# Patient Record
Sex: FEMALE | Race: WHITE | NOT HISPANIC OR LATINO | Employment: FULL TIME | URBAN - NONMETROPOLITAN AREA
[De-identification: names, ages, dates, MRNs, and addresses within clinical notes are randomized per-mention and may not be internally consistent; named-entity substitution may affect disease eponyms.]

---

## 2018-04-27 ENCOUNTER — OFFICE VISIT CONVERTED (OUTPATIENT)
Dept: FAMILY MEDICINE CLINIC | Age: 28
End: 2018-04-27
Attending: FAMILY MEDICINE

## 2018-07-03 ENCOUNTER — OFFICE VISIT CONVERTED (OUTPATIENT)
Dept: FAMILY MEDICINE CLINIC | Age: 28
End: 2018-07-03
Attending: NURSE PRACTITIONER

## 2018-08-29 ENCOUNTER — OFFICE VISIT CONVERTED (OUTPATIENT)
Dept: ORTHOPEDIC SURGERY | Facility: CLINIC | Age: 28
End: 2018-08-29
Attending: ORTHOPAEDIC SURGERY

## 2018-12-19 ENCOUNTER — OFFICE VISIT CONVERTED (OUTPATIENT)
Dept: FAMILY MEDICINE CLINIC | Age: 28
End: 2018-12-19
Attending: FAMILY MEDICINE

## 2019-03-07 ENCOUNTER — OFFICE VISIT CONVERTED (OUTPATIENT)
Dept: FAMILY MEDICINE CLINIC | Age: 29
End: 2019-03-07
Attending: FAMILY MEDICINE

## 2020-01-03 ENCOUNTER — OFFICE VISIT CONVERTED (OUTPATIENT)
Dept: FAMILY MEDICINE CLINIC | Age: 30
End: 2020-01-03
Attending: FAMILY MEDICINE

## 2020-01-03 ENCOUNTER — HOSPITAL ENCOUNTER (OUTPATIENT)
Dept: OTHER | Facility: HOSPITAL | Age: 30
Discharge: HOME | End: 2020-01-03
Attending: FAMILY MEDICINE

## 2020-01-14 ENCOUNTER — HOSPITAL ENCOUNTER (OUTPATIENT)
Dept: PHYSICAL THERAPY | Facility: CLINIC | Age: 30
Setting detail: RECURRING SERIES
Discharge: HOME | End: 2020-02-27
Attending: FAMILY MEDICINE

## 2020-01-21 ENCOUNTER — HOSPITAL ENCOUNTER (OUTPATIENT)
Dept: OTHER | Facility: HOSPITAL | Age: 30
Discharge: HOME | End: 2020-01-21
Attending: FAMILY MEDICINE

## 2020-01-28 ENCOUNTER — OFFICE VISIT CONVERTED (OUTPATIENT)
Dept: NEUROSURGERY | Facility: CLINIC | Age: 30
End: 2020-01-28
Attending: PHYSICIAN ASSISTANT

## 2020-02-05 ENCOUNTER — HOSPITAL ENCOUNTER (OUTPATIENT)
Dept: PERIOP | Facility: HOSPITAL | Age: 30
Setting detail: HOSPITAL OUTPATIENT SURGERY
Discharge: HOME | End: 2020-02-05
Attending: NEUROLOGICAL SURGERY

## 2020-02-27 ENCOUNTER — CONVERSION ENCOUNTER (OUTPATIENT)
Dept: NEUROLOGY | Facility: CLINIC | Age: 30
End: 2020-02-27

## 2020-02-27 ENCOUNTER — OFFICE VISIT CONVERTED (OUTPATIENT)
Dept: NEUROSURGERY | Facility: CLINIC | Age: 30
End: 2020-02-27
Attending: PHYSICIAN ASSISTANT

## 2020-03-30 ENCOUNTER — HOSPITAL ENCOUNTER (OUTPATIENT)
Dept: URGENT CARE | Facility: CLINIC | Age: 30
Discharge: HOME | End: 2020-03-30
Attending: FAMILY MEDICINE

## 2020-04-01 LAB — BACTERIA SPEC AEROBE CULT: NORMAL

## 2020-06-25 ENCOUNTER — OFFICE VISIT CONVERTED (OUTPATIENT)
Dept: NEUROSURGERY | Facility: CLINIC | Age: 30
End: 2020-06-25
Attending: PHYSICIAN ASSISTANT

## 2020-07-18 ENCOUNTER — HOSPITAL ENCOUNTER (OUTPATIENT)
Dept: MRI IMAGING | Facility: HOSPITAL | Age: 30
Discharge: HOME | End: 2020-07-18
Attending: PHYSICIAN ASSISTANT

## 2020-07-21 ENCOUNTER — OFFICE VISIT CONVERTED (OUTPATIENT)
Dept: NEUROSURGERY | Facility: CLINIC | Age: 30
End: 2020-07-21
Attending: PHYSICIAN ASSISTANT

## 2020-08-13 ENCOUNTER — HOSPITAL ENCOUNTER (OUTPATIENT)
Dept: GENERAL RADIOLOGY | Facility: HOSPITAL | Age: 30
Discharge: HOME | End: 2020-08-13
Attending: FAMILY MEDICINE

## 2020-08-13 ENCOUNTER — OFFICE VISIT CONVERTED (OUTPATIENT)
Dept: FAMILY MEDICINE CLINIC | Facility: CLINIC | Age: 30
End: 2020-08-13
Attending: FAMILY MEDICINE

## 2020-08-13 LAB
25(OH)D3 SERPL-MCNC: 32.8 NG/ML (ref 30–100)
ALBUMIN SERPL-MCNC: 4.2 G/DL (ref 3.5–5)
ALBUMIN/GLOB SERPL: 1.3 {RATIO} (ref 1.4–2.6)
ALP SERPL-CCNC: 59 U/L (ref 42–98)
ALT SERPL-CCNC: 67 U/L (ref 10–40)
ANION GAP SERPL CALC-SCNC: 21 MMOL/L (ref 8–19)
AST SERPL-CCNC: 39 U/L (ref 15–50)
BASOPHILS # BLD AUTO: 0.04 10*3/UL (ref 0–0.2)
BASOPHILS NFR BLD AUTO: 0.5 % (ref 0–3)
BILIRUB SERPL-MCNC: 0.19 MG/DL (ref 0.2–1.3)
BUN SERPL-MCNC: 10 MG/DL (ref 5–25)
BUN/CREAT SERPL: 10 {RATIO} (ref 6–20)
CALCIUM SERPL-MCNC: 10.1 MG/DL (ref 8.7–10.4)
CHLORIDE SERPL-SCNC: 106 MMOL/L (ref 99–111)
CONV ABS IMM GRAN: 0.04 10*3/UL (ref 0–0.2)
CONV CO2: 18 MMOL/L (ref 22–32)
CONV IMMATURE GRAN: 0.5 % (ref 0–1.8)
CONV TOTAL PROTEIN: 7.4 G/DL (ref 6.3–8.2)
CREAT UR-MCNC: 0.97 MG/DL (ref 0.5–0.9)
DEPRECATED RDW RBC AUTO: 45.6 FL (ref 36.4–46.3)
EOSINOPHIL # BLD AUTO: 0.18 10*3/UL (ref 0–0.7)
EOSINOPHIL # BLD AUTO: 2.2 % (ref 0–7)
ERYTHROCYTE [DISTWIDTH] IN BLOOD BY AUTOMATED COUNT: 12.8 % (ref 11.7–14.4)
GFR SERPLBLD BASED ON 1.73 SQ M-ARVRAT: >60 ML/MIN/{1.73_M2}
GLOBULIN UR ELPH-MCNC: 3.2 G/DL (ref 2–3.5)
GLUCOSE SERPL-MCNC: 90 MG/DL (ref 65–99)
HCT VFR BLD AUTO: 44.1 % (ref 37–47)
HGB BLD-MCNC: 14.4 G/DL (ref 12–16)
IRON SATN MFR SERPL: 19 % (ref 20–55)
IRON SERPL-MCNC: 70 UG/DL (ref 60–170)
LYMPHOCYTES # BLD AUTO: 2.48 10*3/UL (ref 1–5)
LYMPHOCYTES NFR BLD AUTO: 30.3 % (ref 20–45)
MCH RBC QN AUTO: 31.9 PG (ref 27–31)
MCHC RBC AUTO-ENTMCNC: 32.7 G/DL (ref 33–37)
MCV RBC AUTO: 97.8 FL (ref 81–99)
MONOCYTES # BLD AUTO: 0.72 10*3/UL (ref 0.2–1.2)
MONOCYTES NFR BLD AUTO: 8.8 % (ref 3–10)
NEUTROPHILS # BLD AUTO: 4.73 10*3/UL (ref 2–8)
NEUTROPHILS NFR BLD AUTO: 57.7 % (ref 30–85)
NRBC CBCN: 0 % (ref 0–0.7)
OSMOLALITY SERPL CALC.SUM OF ELEC: 289 MOSM/KG (ref 273–304)
PLATELET # BLD AUTO: 254 10*3/UL (ref 130–400)
PMV BLD AUTO: 10.8 FL (ref 9.4–12.3)
POTASSIUM SERPL-SCNC: 4.8 MMOL/L (ref 3.5–5.3)
RBC # BLD AUTO: 4.51 10*6/UL (ref 4.2–5.4)
SODIUM SERPL-SCNC: 140 MMOL/L (ref 135–147)
T4 FREE SERPL-MCNC: 1 NG/DL (ref 0.9–1.8)
TIBC SERPL-MCNC: 372 UG/DL (ref 245–450)
TRANSFERRIN SERPL-MCNC: 260 MG/DL (ref 250–380)
TSH SERPL-ACNC: 2.03 M[IU]/L (ref 0.27–4.2)
WBC # BLD AUTO: 8.19 10*3/UL (ref 4.8–10.8)

## 2020-08-19 ENCOUNTER — TELEPHONE CONVERTED (OUTPATIENT)
Dept: FAMILY MEDICINE CLINIC | Facility: CLINIC | Age: 30
End: 2020-08-19
Attending: FAMILY MEDICINE

## 2020-09-10 ENCOUNTER — HOSPITAL ENCOUNTER (OUTPATIENT)
Dept: GENERAL RADIOLOGY | Facility: HOSPITAL | Age: 30
Discharge: HOME | End: 2020-09-10
Attending: FAMILY MEDICINE

## 2020-09-10 ENCOUNTER — OFFICE VISIT CONVERTED (OUTPATIENT)
Dept: FAMILY MEDICINE CLINIC | Facility: CLINIC | Age: 30
End: 2020-09-10
Attending: FAMILY MEDICINE

## 2020-09-10 ENCOUNTER — CONVERSION ENCOUNTER (OUTPATIENT)
Dept: FAMILY MEDICINE CLINIC | Facility: CLINIC | Age: 30
End: 2020-09-10

## 2020-10-05 ENCOUNTER — OFFICE VISIT CONVERTED (OUTPATIENT)
Dept: PODIATRY | Facility: CLINIC | Age: 30
End: 2020-10-05
Attending: PODIATRIST

## 2020-10-26 ENCOUNTER — TELEPHONE CONVERTED (OUTPATIENT)
Dept: FAMILY MEDICINE CLINIC | Facility: CLINIC | Age: 30
End: 2020-10-26
Attending: FAMILY MEDICINE

## 2020-12-03 ENCOUNTER — HOSPITAL ENCOUNTER (OUTPATIENT)
Dept: PREADMISSION TESTING | Facility: HOSPITAL | Age: 30
Discharge: HOME | End: 2020-12-03
Attending: OBSTETRICS & GYNECOLOGY

## 2020-12-07 LAB — SARS-COV-2 RNA SPEC QL NAA+PROBE: NOT DETECTED

## 2020-12-08 ENCOUNTER — HOSPITAL ENCOUNTER (OUTPATIENT)
Dept: PERIOP | Facility: HOSPITAL | Age: 30
Setting detail: HOSPITAL OUTPATIENT SURGERY
Discharge: HOME | End: 2020-12-09
Attending: OBSTETRICS & GYNECOLOGY

## 2020-12-08 LAB
ABO GROUP BLD: NORMAL
ANION GAP SERPL CALC-SCNC: 11 MMOL/L (ref 8–19)
BASOPHILS # BLD AUTO: 0.04 10*3/UL (ref 0–0.2)
BASOPHILS NFR BLD AUTO: 0.6 % (ref 0–3)
BLD GP AB SCN SERPL QL: NORMAL
BUN SERPL-MCNC: 10 MG/DL (ref 5–25)
BUN/CREAT SERPL: 10 {RATIO} (ref 6–20)
CALCIUM SERPL-MCNC: 8.8 MG/DL (ref 8.7–10.4)
CHLORIDE SERPL-SCNC: 107 MMOL/L (ref 99–111)
CONV ABD CONTROL: NORMAL
CONV ABS IMM GRAN: 0.03 10*3/UL (ref 0–0.2)
CONV CO2: 23 MMOL/L (ref 22–32)
CONV IMMATURE GRAN: 0.4 % (ref 0–1.8)
CREAT UR-MCNC: 0.97 MG/DL (ref 0.5–0.9)
DEPRECATED RDW RBC AUTO: 45.5 FL (ref 36.4–46.3)
EOSINOPHIL # BLD AUTO: 0.18 10*3/UL (ref 0–0.7)
EOSINOPHIL # BLD AUTO: 2.5 % (ref 0–7)
ERYTHROCYTE [DISTWIDTH] IN BLOOD BY AUTOMATED COUNT: 12.8 % (ref 11.7–14.4)
GFR SERPLBLD BASED ON 1.73 SQ M-ARVRAT: >60 ML/MIN/{1.73_M2}
GLUCOSE SERPL-MCNC: 99 MG/DL (ref 65–99)
HCT VFR BLD AUTO: 41.6 % (ref 37–47)
HCT VFR BLD AUTO: 42.6 % (ref 37–47)
HGB BLD-MCNC: 13.6 G/DL (ref 12–16)
HGB BLD-MCNC: 14.3 G/DL (ref 12–16)
LYMPHOCYTES # BLD AUTO: 2.61 10*3/UL (ref 1–5)
LYMPHOCYTES NFR BLD AUTO: 36.8 % (ref 20–45)
MCH RBC QN AUTO: 31.3 PG (ref 27–31)
MCHC RBC AUTO-ENTMCNC: 32.7 G/DL (ref 33–37)
MCV RBC AUTO: 95.6 FL (ref 81–99)
MONOCYTES # BLD AUTO: 0.68 10*3/UL (ref 0.2–1.2)
MONOCYTES NFR BLD AUTO: 9.6 % (ref 3–10)
NEUTROPHILS # BLD AUTO: 3.56 10*3/UL (ref 2–8)
NEUTROPHILS NFR BLD AUTO: 50.1 % (ref 30–85)
NRBC CBCN: 0 % (ref 0–0.7)
OSMOLALITY SERPL CALC.SUM OF ELEC: 281 MOSM/KG (ref 273–304)
PLATELET # BLD AUTO: 228 10*3/UL (ref 130–400)
PMV BLD AUTO: 10.1 FL (ref 9.4–12.3)
POTASSIUM SERPL-SCNC: 4.6 MMOL/L (ref 3.5–5.3)
RBC # BLD AUTO: 4.35 10*6/UL (ref 4.2–5.4)
RH BLD: NORMAL
SODIUM SERPL-SCNC: 136 MMOL/L (ref 135–147)
WBC # BLD AUTO: 7.1 10*3/UL (ref 4.8–10.8)

## 2020-12-09 LAB
ANION GAP SERPL CALC-SCNC: 13 MMOL/L (ref 8–19)
BASOPHILS # BLD AUTO: 0.04 10*3/UL (ref 0–0.2)
BASOPHILS NFR BLD AUTO: 0.2 % (ref 0–3)
BUN SERPL-MCNC: 9 MG/DL (ref 5–25)
BUN/CREAT SERPL: 10 {RATIO} (ref 6–20)
CALCIUM SERPL-MCNC: 9.1 MG/DL (ref 8.7–10.4)
CHLORIDE SERPL-SCNC: 104 MMOL/L (ref 99–111)
CONV ABS IMM GRAN: 0.11 10*3/UL (ref 0–0.2)
CONV CO2: 20 MMOL/L (ref 22–32)
CONV IMMATURE GRAN: 0.6 % (ref 0–1.8)
CREAT UR-MCNC: 0.87 MG/DL (ref 0.5–0.9)
DEPRECATED RDW RBC AUTO: 43.1 FL (ref 36.4–46.3)
EOSINOPHIL # BLD AUTO: 0.01 10*3/UL (ref 0–0.7)
EOSINOPHIL # BLD AUTO: 0.1 % (ref 0–7)
ERYTHROCYTE [DISTWIDTH] IN BLOOD BY AUTOMATED COUNT: 12.6 % (ref 11.7–14.4)
GFR SERPLBLD BASED ON 1.73 SQ M-ARVRAT: >60 ML/MIN/{1.73_M2}
GLUCOSE SERPL-MCNC: 125 MG/DL (ref 65–99)
HCT VFR BLD AUTO: 38.6 % (ref 37–47)
HGB BLD-MCNC: 13.1 G/DL (ref 12–16)
LYMPHOCYTES # BLD AUTO: 2.59 10*3/UL (ref 1–5)
LYMPHOCYTES NFR BLD AUTO: 14.3 % (ref 20–45)
MCH RBC QN AUTO: 31.4 PG (ref 27–31)
MCHC RBC AUTO-ENTMCNC: 33.9 G/DL (ref 33–37)
MCV RBC AUTO: 92.6 FL (ref 81–99)
MONOCYTES # BLD AUTO: 1.1 10*3/UL (ref 0.2–1.2)
MONOCYTES NFR BLD AUTO: 6.1 % (ref 3–10)
NEUTROPHILS # BLD AUTO: 14.26 10*3/UL (ref 2–8)
NEUTROPHILS NFR BLD AUTO: 78.7 % (ref 30–85)
NRBC CBCN: 0 % (ref 0–0.7)
OSMOLALITY SERPL CALC.SUM OF ELEC: 274 MOSM/KG (ref 273–304)
PLATELET # BLD AUTO: 309 10*3/UL (ref 130–400)
PMV BLD AUTO: 10.3 FL (ref 9.4–12.3)
POTASSIUM SERPL-SCNC: 5.1 MMOL/L (ref 3.5–5.3)
RBC # BLD AUTO: 4.17 10*6/UL (ref 4.2–5.4)
SODIUM SERPL-SCNC: 132 MMOL/L (ref 135–147)
WBC # BLD AUTO: 18.11 10*3/UL (ref 4.8–10.8)

## 2020-12-16 ENCOUNTER — CONVERSION ENCOUNTER (OUTPATIENT)
Dept: FAMILY MEDICINE CLINIC | Facility: CLINIC | Age: 30
End: 2020-12-16

## 2020-12-16 ENCOUNTER — TELEPHONE CONVERTED (OUTPATIENT)
Dept: FAMILY MEDICINE CLINIC | Facility: CLINIC | Age: 30
End: 2020-12-16
Attending: FAMILY MEDICINE

## 2021-01-18 ENCOUNTER — OFFICE VISIT CONVERTED (OUTPATIENT)
Dept: FAMILY MEDICINE CLINIC | Facility: CLINIC | Age: 31
End: 2021-01-18
Attending: FAMILY MEDICINE

## 2021-02-12 ENCOUNTER — TELEPHONE CONVERTED (OUTPATIENT)
Dept: FAMILY MEDICINE CLINIC | Facility: CLINIC | Age: 31
End: 2021-02-12
Attending: FAMILY MEDICINE

## 2021-03-05 ENCOUNTER — TELEMEDICINE CONVERTED (OUTPATIENT)
Dept: FAMILY MEDICINE CLINIC | Facility: CLINIC | Age: 31
End: 2021-03-05
Attending: FAMILY MEDICINE

## 2021-03-25 ENCOUNTER — HOSPITAL ENCOUNTER (OUTPATIENT)
Dept: GENERAL RADIOLOGY | Facility: HOSPITAL | Age: 31
Discharge: HOME | End: 2021-03-25
Attending: FAMILY MEDICINE

## 2021-03-25 ENCOUNTER — OFFICE VISIT CONVERTED (OUTPATIENT)
Dept: FAMILY MEDICINE CLINIC | Facility: CLINIC | Age: 31
End: 2021-03-25
Attending: FAMILY MEDICINE

## 2021-03-25 LAB
ALBUMIN SERPL-MCNC: 4.2 G/DL (ref 3.5–5)
ALBUMIN/GLOB SERPL: 1.2 {RATIO} (ref 1.4–2.6)
ALP SERPL-CCNC: 91 U/L (ref 42–98)
ALT SERPL-CCNC: 72 U/L (ref 10–40)
ANION GAP SERPL CALC-SCNC: 18 MMOL/L (ref 8–19)
AST SERPL-CCNC: 39 U/L (ref 15–50)
BASOPHILS # BLD AUTO: 0.05 10*3/UL (ref 0–0.2)
BASOPHILS NFR BLD AUTO: 0.4 % (ref 0–3)
BILIRUB SERPL-MCNC: 0.18 MG/DL (ref 0.2–1.3)
BUN SERPL-MCNC: 12 MG/DL (ref 5–25)
BUN/CREAT SERPL: 12 {RATIO} (ref 6–20)
CALCIUM SERPL-MCNC: 9.2 MG/DL (ref 8.7–10.4)
CHLORIDE SERPL-SCNC: 101 MMOL/L (ref 99–111)
CHOLEST SERPL-MCNC: 239 MG/DL (ref 107–200)
CHOLEST/HDLC SERPL: 6.6 {RATIO} (ref 3–6)
CONV ABS IMM GRAN: 0.08 10*3/UL (ref 0–0.2)
CONV CO2: 25 MMOL/L (ref 22–32)
CONV IMMATURE GRAN: 0.7 % (ref 0–1.8)
CONV TOTAL PROTEIN: 7.8 G/DL (ref 6.3–8.2)
CREAT UR-MCNC: 1.01 MG/DL (ref 0.5–0.9)
DEPRECATED RDW RBC AUTO: 44.4 FL (ref 36.4–46.3)
EOSINOPHIL # BLD AUTO: 0.23 10*3/UL (ref 0–0.7)
EOSINOPHIL # BLD AUTO: 2 % (ref 0–7)
ERYTHROCYTE [DISTWIDTH] IN BLOOD BY AUTOMATED COUNT: 12.7 % (ref 11.7–14.4)
FERRITIN SERPL-MCNC: 107 NG/ML (ref 10–200)
FOLATE SERPL-MCNC: 15.5 NG/ML (ref 4.8–20)
FSH SERPL-ACNC: 25.7 M[IU]/ML
GFR SERPLBLD BASED ON 1.73 SQ M-ARVRAT: >60 ML/MIN/{1.73_M2}
GLOBULIN UR ELPH-MCNC: 3.6 G/DL (ref 2–3.5)
GLUCOSE SERPL-MCNC: 113 MG/DL (ref 65–99)
HCT VFR BLD AUTO: 44.8 % (ref 37–47)
HDLC SERPL-MCNC: 36 MG/DL (ref 40–60)
HGB BLD-MCNC: 14.7 G/DL (ref 12–16)
IRON SATN MFR SERPL: 16 % (ref 20–55)
IRON SERPL-MCNC: 68 UG/DL (ref 60–170)
LDLC SERPL CALC-MCNC: 134 MG/DL (ref 70–100)
LH SERPL-ACNC: 30.4 M[IU]/ML
LYMPHOCYTES # BLD AUTO: 3.45 10*3/UL (ref 1–5)
LYMPHOCYTES NFR BLD AUTO: 29.3 % (ref 20–45)
MCH RBC QN AUTO: 31.1 PG (ref 27–31)
MCHC RBC AUTO-ENTMCNC: 32.8 G/DL (ref 33–37)
MCV RBC AUTO: 94.9 FL (ref 81–99)
MONOCYTES # BLD AUTO: 0.94 10*3/UL (ref 0.2–1.2)
MONOCYTES NFR BLD AUTO: 8 % (ref 3–10)
NEUTROPHILS # BLD AUTO: 7.01 10*3/UL (ref 2–8)
NEUTROPHILS NFR BLD AUTO: 59.6 % (ref 30–85)
NRBC CBCN: 0 % (ref 0–0.7)
OSMOLALITY SERPL CALC.SUM OF ELEC: 289 MOSM/KG (ref 273–304)
PLATELET # BLD AUTO: 277 10*3/UL (ref 130–400)
PMV BLD AUTO: 11.4 FL (ref 9.4–12.3)
POTASSIUM SERPL-SCNC: 4.8 MMOL/L (ref 3.5–5.3)
RBC # BLD AUTO: 4.72 10*6/UL (ref 4.2–5.4)
SODIUM SERPL-SCNC: 139 MMOL/L (ref 135–147)
T4 FREE SERPL-MCNC: 1.2 NG/DL (ref 0.9–1.8)
TIBC SERPL-MCNC: 436 UG/DL (ref 245–450)
TRANSFERRIN SERPL-MCNC: 305 MG/DL (ref 250–380)
TRIGL SERPL-MCNC: 478 MG/DL (ref 40–150)
TSH SERPL-ACNC: 1.48 M[IU]/L (ref 0.27–4.2)
VIT B12 SERPL-MCNC: 418 PG/ML (ref 211–911)
WBC # BLD AUTO: 11.76 10*3/UL (ref 4.8–10.8)

## 2021-03-29 LAB
CONV ANTI MICROSOMAL AB: <9 IU/ML (ref 0–34)
THYROGLOBULIN ANTIBODY: <1 IU/ML (ref 0–0.9)

## 2021-04-08 ENCOUNTER — OFFICE VISIT CONVERTED (OUTPATIENT)
Dept: FAMILY MEDICINE CLINIC | Facility: CLINIC | Age: 31
End: 2021-04-08
Attending: FAMILY MEDICINE

## 2021-04-16 ENCOUNTER — HOSPITAL ENCOUNTER (OUTPATIENT)
Dept: GENERAL RADIOLOGY | Facility: HOSPITAL | Age: 31
Discharge: HOME | End: 2021-04-16
Attending: FAMILY MEDICINE

## 2021-04-16 LAB
EST. AVERAGE GLUCOSE BLD GHB EST-MCNC: 114 MG/DL
HBA1C MFR BLD: 5.6 % (ref 3.5–5.7)

## 2021-05-06 ENCOUNTER — HOSPITAL ENCOUNTER (OUTPATIENT)
Dept: DIABETES SERVICES | Facility: HOSPITAL | Age: 31
Discharge: HOME | End: 2021-05-06
Attending: NURSE PRACTITIONER

## 2021-05-06 ENCOUNTER — OFFICE VISIT CONVERTED (OUTPATIENT)
Dept: URGENT CARE | Facility: CLINIC | Age: 31
End: 2021-05-06
Attending: NURSE PRACTITIONER

## 2021-05-06 LAB — GLUCOSE BLD-MCNC: 97 MG/DL (ref 65–99)

## 2021-05-10 NOTE — H&P
History and Physical      Patient Name: Raven Eckert   Patient ID: [id number]   Sex: Female   YOB: 1990    Referring Provider: Srinivasa Clarke MD    Visit Date: August 13, 2020    Provider: Fransisca Carver DO   Location: [facility]   Location Address: [address] [place] [address]  [address]  [place], [place]  [id number]   Location Phone: [phone]          Chief Complaint  · Pt here today to establish care and would like to have a PT referral and GYN referral.      History Of Present Illness  Raven Eckert is a 29 year old /White female who presents for evaluation and treatment of:      She is here today to establish relations a new patient.  She has a past medical history significant for arthritis, diabetes, bipolar, hypoglycemia, muscle cramps and seasonal allergies.    She has past medical history significant for degenerative disc disease and granulation tissue on L5/S1 with a very small left L4/5 disc protrusion which is stable from prior MRI.    She had laminectomy in January. She has chronic low back pain. She is managed by Dr Ander Street of pain management. She is asking for physcial therapy.     She is complained today of menometrorrhagia.  She has been on metformin in the past that did help with her symptoms.  She is asking for gynecology referral.    She has been seeing [email or web address] in the past. She is on Latuda and it is doing great.    The patient is interested in losing weight.  She struggled with weight most of her life.  She denies any illicit drug use and has never been on weight loss medication.    She has no other complaints today denies chest pain, shortness of breath, weakness, numbness, nausea, vomiting, diarrhea dizziness or syncope.       Past Medical History  Disease Name Date Onset Notes   Anxiety --  --    Arthritis --  --    Bipolar mood disorder --  --    Broken Bones 2012 --    Cataract --  --    Degenerative disc disease, lumbar --  --     Depression --  --    Diabetes --  --    Foot pain --  --    Hypoglycemia --  --    Leg pain --  --    Limb Swelling --  --    Muscle cramps --  --    Psychiatric disorder --  --    Seasonal allergies --  --          Past Surgical History  Procedure Name Date Notes   Ablation --  --    Cesarian Section --  --    Foot surgery --  --    Metal implants --  --    Minimally invasive Discectomy 2-5-20 Left L5-S1         Medication List  Name Date Started Instructions   clonazepam 0.5 mg oral tablet  take 1 tablet (0.5 mg) by oral route 1 time per day   hydrocodone-acetaminophen 5-325 mg oral tablet 08/04/2020 take 1 tablet by oral route 3 times a day as needed PAIN   Latuda 20 mg oral tablet  take 1 tablet (20 mg) by oral route once daily         Allergy List  Allergen Name Date Reaction Notes   Lamictal --  --  --    SULFA (SULFONAMIDES) --  --  --          Family Medical History  Disease Name Relative/Age Notes   Diabetes, unspecified type Mother/   Mother   Arthrtis Father/  Mother/   --    Family history of certain chronic disabling diseases; arthritis Father/  Mother/   Mother; Father   Family history of Arthritis Father/  Mother/   Mother; Father   Family history of cancer Father/   Father   Family history of diabetes mellitus Mother/   Mother         Social History  Finding Status Start/Stop Quantity Notes   Alcohol Never --/-- --  --    lives with children --  --/-- --  --    Recreational Drug Use Never --/-- --  no   Tobacco Current every day --/-- 1 PPD current every day smoker, 1 packs per day, smoked 6-10 years  current every day smoker, 0.5 pack per day, smoked 6-10 years   Working --  --/-- --  --          Review of Systems  · Constitutional  o Denies  o : fatigue, night sweats  · HENT  o Denies  o : vertigo, recent head injury  · Cardiovascular  o Denies  o : chest pain, irregular heart beats  · Respiratory  o Denies  o : shortness of breath, productive cough  · Gastrointestinal  o Denies  o : nausea,  "vomiting  · Genitourinary  o Denies  o : dysuria, urinary retention  · Musculoskeletal  o Denies  o : joint swelling, limitation of motion  · Endocrine  o Denies  o : cold intolerance, heat intolerance      Vitals  Date Time BP Position Site L\R Cuff Size HR RR TEMP (F) WT  HT  BMI kg/m2 BSA m2 O2 Sat        08/13/2020 08:53 /58 Sitting    91 - R 16 97.5 339lbs 8oz 5'  6\" 54.8 2.68 100 %          Physical Examination  · Constitutional  o Appearance  o : morbidly obese, well developed, alert, no acute distress  · Head and Face  o Head  o :   § Inspection  § : atraumatic, normocephalic  · Eyes  o Eyes  o : extraocular movements intact, no scleral icterus, no conjunctival injection  · Ears, Nose, Mouth and Throat  o Ears  o :   § External Ears  § : normal  o Nose  o :   § Intranasal Exam  § : nares patent  o Oral Cavity  o :   § Oral Mucosa  § : moist mucous membranes  · Respiratory  o Respiratory Effort  o : breathing comfortably, symmetric chest rise  o Auscultation of Lungs  o : clear to asculatation bilaterally, no wheezes, rales, or rhonchii  · Cardiovascular  o Heart  o :   § Auscultation of Heart  § : regular rate and rhythm, no murmurs, rubs, or gallops  o Peripheral Vascular System  o :   § Extremities  § : no edema  · Skin and Subcutaneous Tissue  o General Inspection  o : no rashes present, no lesions present, no areas of discoloration, skin turgor normal  · Neurologic  o Mental Status Examination  o :   § Orientation  § : grossly oriented to person, place and time  o Cranial Nerves  o : crainial nerves 2-12 grossly intact  o Gait and Station  o :   § Gait Screening  § : normal gait  · Psychiatric  o General  o : normal mood and affect              Assessment  · Fatigue     780.79/R53.83  · Screening for depression     V79.0/Z13.89  · Adult BMI 50.0-59.9 kg/sq m     V85.43/Z68.43  · Menometrorrhagia     626.2/N92.1  · Low back pain     724.2/M54.5      Plan  · Orders  o CBC with Auto Diff [facility] (32616) " - 780.79/R53.83 - 08/13/2020  o CMP H (54068) - 780.79/R53.83 - 08/13/2020  o Iron panel (iron, TIBC, transferrin saturation) (62758, 91781, 20053) - 780.79/R53.83, 626.2/N92.1 - 08/13/2020  o Thyroid Profile (41292, 11935, THYII) - 780.79/R53.83 - 08/13/2020  o Vitamin D (25-Hydroxy) Level (99507) - 780.79/R53.83 - 08/13/2020  o ACO-39: Current medications updated and reviewed () - - 08/13/2020  o ACO-18: Negative screen for clinical depression using a standardized tool () - - 08/13/2020  o OB/GYN CONSULTATION (OBGYN) - 626.2/N92.1 - 08/13/2020  o PHYSICAL THERAPY CONSULTATION (PHYTH) - 724.2/M54.5 - 08/13/2020  · Medications  o Medications have been Reconciled  o Transition of Care or Provider Policy  · Instructions  o Depression Screen completed and scanned into the EMR under the designated folder within the patient's documents.  o Today's PHQ-9 result is _6__  o Patient was educated/instructed on their diagnosis, treatment and medications prior to discharge from the clinic today.  · Disposition  o Return Visit Request in/on 1 month +/- 2 days (86794).     1.  Low back pain: The patient was given referral today for physical therapy.  2.  Menometrorrhagia: The patient was given a referral today to gynecology.  3.  Morbid obesity: Diet, weight loss and exercise were highly encouraged today's visit.  Different weight loss medications were reviewed.  Should be brought back in a month and at that time we will re-broach the subject and manage according findings.  4.  Fatigue: CBC, CMP, iron panel as well as thyroid profile were ordered today and should be managed according to findings.  Follow-up in 1 month.             Electronically Signed by: Fransisca Carver DO -Author on August 13, 2020 02:03:21 PM

## 2021-05-10 NOTE — H&P
History and Physical      Patient Name: Raven Eckert   Patient ID: [id number]   Sex: Female   YOB: 1990    Primary Care Provider: Fransisca Carver DO   Referring Provider: Fransisca Carver DO    Visit Date: October 5, 2020    Provider: Otilio Rowland DPM   Location: [facility]   Location Address: [address] [place] [address]  [id number]   Location Phone: [phone]          Chief Complaint  · Left Foot Pain      History Of Present Illness  Raven Eckert is a 29 year old /White female who presents to the [facility] today new patient referred from Fransisca Carver DO.      New, Established, New Problem:  new  Location:  Left ankle  Duration:  2003 Left ankle fracture  Onset:  insidious  Nature:  sore, achy  Stable, worsening, improving:  worsening    Aggravating factors:   Patient relates pain is aggravated by shoe gear and ambulation.   Previous Treatment:  2003 ORIF Left ankle fracture after a fall by Dr. Torres ([place]), 2009 Hardware removal        New, Established, New Problem:  SECOND PROBLEM  Location:  LEFT 5TH TOE  Duration:  30 SEPTEMBER 2020  Onset:  TRAUMA  Nature:  SORE, SHARP  Stable, worsening, improving:  STABLE    Aggravating factors:     Previous Treatment:  X-RAY, CAM walker        Pt states he also sees Dr. Ander Street for low back issues and has had [facility].    Back surgery of L-4 & L-5 in January 2020 by Dr. Street.    Pt works as a correction officers.       Past Medical History  Anxiety; Arthritis; Bipolar mood disorder; Broken Bones; Cataract; Degenerative disc disease, lumbar; Depression; Diabetes; Foot pain; Heel pain; Hypoglycemia; Leg pain; Limb Swelling; Muscle cramps; Numbness in feet; Psychiatric disorder; Seasonal allergies         Past Surgical History  Ablation; Cesarian Section; Foot surgery; Metal implants; Minimally invasive Discectomy         Medication List  Latuda 40 mg  oral tablet; phentermine 30 mg oral capsule         Allergy List  Lamictal; SULFA (SULFONAMIDES)         Family Medical History  Diabetes, unspecified type; Arthrtis; Family history of certain chronic disabling diseases; arthritis; Family history of Arthritis; Family history of cancer; Family history of diabetes mellitus         Social History  Alcohol (Never); lives with children; Recreational Drug Use (Never); Tobacco (Current every day); Working         Review of Systems  · Constitutional  o Denies  o : fatigue, night sweats  · Eyes  o Denies  o : double vision, blurred vision  · HENT  o Denies  o : vertigo, recent head injury  · Cardiovascular  o Denies  o : chest pain, irregular heart beats  · Respiratory  o Denies  o : shortness of breath, productive cough  · Gastrointestinal  o Denies  o : nausea, vomiting  · Genitourinary  o Denies  o : dysuria, urinary retention  · Integument  o Denies  o : hair growth change, new skin lesions  · Neurologic  o Denies  o : altered mental status, seizures  · Musculoskeletal  o * See HPI  · Endocrine  o Denies  o : cold intolerance, heat intolerance  · Heme-Lymph  o Denies  o : petechiae, lymph node enlargement or tenderness  · Allergic-Immunologic  o Denies  o : frequent illnesses      Physical Examination  · Constitutional  o Appearance  o : overweight, well developed  · Cardiovascular  o Peripheral Vascular System  o :   § Pedal Pulses  § : pulses 2 + and symmetrical  § Extremities  § : no edema in lower extremities  · Musculoskeletal  o General  o :   § General Musculoskeletal  § : Lower extremity muscle and strength and range of motion is equal and symmetrical bilaterally. The knees are noted to be normal in alignment. Ankle alignment and range of motion is notmral and foot structure is normal. Subtalar, metatarsal and metatarsal-phalangeal range of motion is noted to be within normal limits. The digits of both feet are in normal alignment. The gait is normal. Left toe in  rectus position. Local ecchymosis. No signs of edema, erythema, lymphangitis, nor signs of infection.   · Skin and Subcutaneous Tissue  o General Inspection  o : Skin is noted to have normal texture and turgor, with no excrescences noted.   o Digits and Nails  o : The toenails are noted to be without disease.  · Neurologic  o Sensation  o : Epicritic sensations intact bilaterally.  · Left Ankle/Foot  o Inspection  o : Positive tenderness to palpation to the medial tubercle of the calcaneus. No signs of edema, erythema, lymphangitis, nor signs of infection.     Dr. Rowland reviewed radiographs and results from [facility] and discussed them with the patient.  These are significant for non, displaced, non-intra-articular fracture of the 5th proximal phalanx.  Previous ORIF in tibial.  There is degenerative joint disease of the ankle joint seen.      Pt is walking in a CAM walker on Left.           Assessment  · Foot pain, left     729.5/M79.672  · Plantar fascial fibromatosis of left foot     728.71/M72.2  · Toe fracture, left     826.0/S92.912A      Plan  · Medications  o naproxen 500 mg oral tablet   SIG: take 1 tablet by oral route 2 times a day as needed for 30 days   DISP: (60) Tablet with 1 refills  Prescribed on 10/05/2020     o Medrol (Rustam) 4 mg oral tablets,dose pack   SIG: take as directed for 6 days   DISP: (1) Package with 0 refills  Prescribed on 10/05/2020     o Medications have been Reconciled  o Transition of Care or Provider Policy  · Instructions  o Handouts provided regarding Planter Fascitis.  o Overview: This patient has a plantar fasciitis.  o Discuss Findings: I have discussed the findings of this evaluation with the patient. The discussion included a complete verbal explanation of any changes in the examination results, diagnosis, and the current treatment plan. A schedule for future care needs was explained. If any questions should arise after returning home, I have encouraged  the patient to feel free to contact Dr. Rowland. The patient states understanding and agreement with this plan.  o Monitor For Problems: Pt to monitor for problems and to contact Dr. Rowland for follow-up should such signs occur. Patient states understanding and agreement with this plan.  o Spenco: Recommend OTC Spenco Orthotics.  o Treatement Alternatives: Nonsurgical treatments almost always improve the pain. Treatment can last from several months to 2 years before symptoms get better. Most patients feel better in 9 months. Rarely Some people need surgery to relieve the pain.  o Conservative Treatment Recommendations: Anti-inflammatory medication to reduce pain and inflammation, Heel stretching exercises,   o Follow Up in 2 weeks.  o Patient may begin to weight bear as tolerated in supportive shoes. No impact activities for one month. After that time, the patient may increase activities as tolerated. Patient states understanding and agreement with this plan.  o Electronically Identified Patient Education Materials Provided Electronically  · Disposition  o Call or Return if symptoms worsen or persist.  · Referrals  o ID: [id number] Date: 09/16/2020 Type: Inbound  Specialty: Podiatry            Electronically Signed by: Otilio Rowland DPM -Author on October 12, 2020 09:32:38 AM

## 2021-05-11 ENCOUNTER — HOSPITAL ENCOUNTER (OUTPATIENT)
Dept: URGENT CARE | Facility: CLINIC | Age: 31
Discharge: HOME | End: 2021-05-11
Attending: PHYSICIAN ASSISTANT

## 2021-05-13 NOTE — PROGRESS NOTES
Progress Note      Patient Name: Raven Eckert   Patient ID: [id number]   Sex: Female   YOB: 1990    Primary Care Provider: Fransisca Carver DO   Referring Provider: Fransisca Carver DO    Visit Date: October 26, 2020    Provider: Fransisca Carver DO   Location: [facility]   Location Address: [address] [place] [address]  [id number]   Location Phone: [phone]          Chief Complaint  · Follow up for phentermine       History Of Present Illness  Raven Eckert is a 29 year old /White female who presents for evaluation and treatment of:   TELEHEALTH TELEPHONE VISIT  Raven Eckert is a 29 year old /White female who is presenting for evaluation via telehealth telephone visit. Verbal consent obtained before beginning visit.   Provider spent 21 minutes with patient during telehealth visit.   The following staff were present during this visit: Aaliyah Ackerman MA, Fransisca Carver DO.   Past Medical History/Overview of Patient Symptoms     She is being managed via telephone.     She has 2 children.  She is a smoker.  She has a past medical history significant for arthritis, tobacco abuse, diabetes, bipolar, hypoglycemia, muscle cramps and seasonal allergies.    Labs 8/13/2020: TSH 2.03, free T4 1.0, hemoglobin 14.4, MCV 97.8, platelets 254, glucose 90, creatinine 0.97, GFR greater than 60, potassium 4.8, ALT 67, AST 39, iron 70, percent saturation 19%, vitamin D 32.8.    She has past medical history significant for degenerative disc disease and granulation tissue on L5/S1 with a very small left L4/5 disc protrusion which is stable from prior MRI. She had laminectomy in January. She has chronic low back pain. She is managed by Dr Jorge A Street of pain management. She is doing PT.     She is being managed by [facility] management by Jorge A Hardy. Per visit report from 8/19/2020 the patient is planned to have steroid injections to L4-5, L5-S1. She is also  presribed Norco for pain.       At her last visit she had been having some increasing sinus congestion that is producing yellow drainage.  She also had some headaches and started to have some mild cough.  She denies any substantial fevers or chills. She was given azithromycin.     She has no other complaints today denies chest pain, shortness of breath, weakness, numbness, nausea, vomiting, diarrhea dizziness or syncope.       Past Medical History  Disease Name Date Onset Notes   Anxiety --  --    Arthritis --  --    Bipolar mood disorder --  --    Broken Bones 2012 --    Cataract --  --    Degenerative disc disease, lumbar --  --    Depression --  --    Diabetes --  --    Foot pain --  --    Heel pain --  --    Hypoglycemia --  --    Leg pain --  --    Limb Swelling --  --    Muscle cramps --  --    Numbness in feet --  --    Psychiatric disorder --  --    Seasonal allergies --  --          Past Surgical History  Procedure Name Date Notes   Ablation --  --    Cesarian Section --  --    Foot surgery --  --    Metal implants --  --    Minimally invasive Discectomy 2-5-20 Left L5-S1         Medication List  Name Date Started Instructions   Latuda 40 mg oral tablet 09/17/2020 take 1 tablet daily   naproxen 500 mg oral tablet 10/05/2020 take 1 tablet by oral route 2 times a day as needed for 30 days   phentermine 30 mg oral capsule 10/26/2020 take 1 cap daily         Allergy List  Allergen Name Date Reaction Notes   Lamictal --  --  --    SULFA (SULFONAMIDES) --  --  --          Family Medical History  Disease Name Relative/Age Notes   Diabetes, unspecified type Mother/   Mother   Arthrtis Father/  Mother/   --    Family history of certain chronic disabling diseases; arthritis Father/  Mother/   Mother; Father   Family history of Arthritis Father/  Mother/   Mother; Father   Family history of cancer Father/   Father   Family history of diabetes mellitus Mother/   Mother         Social History  Finding Status Start/Stop  Quantity Notes   Alcohol Never --/-- --  --    lives with children --  --/-- --  --    Recreational Drug Use Never --/-- --  no   Tobacco Current every day --/-- 1 PPD current every day smoker, 1 packs per day, smoked 6-10 years  current every day smoker, 0.5 pack per day, smoked 6-10 years   Working --  --/-- --  --                  Assessment  · Obesity     278.00/E66.9  Diet, weight loss and exercise were highly encouraged today's visit.  · Weight loss counseling, encounter for     V65.3/Z71.3  The patient will be continued on phentermine as prescribed and was encouraged to continue dieting, losing weight and exercising.      Plan  · Orders  o ACO-39: Current medications updated and reviewed (1159F, ) - - 10/26/2020  o ACO-14: Influenza immunization was not administered for reasons documented [facility] () - - 10/26/2020  · Medications  o phentermine 30 mg oral capsule   SIG: take 1 cap daily   DISP: (30) Capsule with 0 refills  Refilled on 10/26/2020     o Medications have been Reconciled  o Transition of Care or Provider Policy  · Instructions  o Patient was educated/instructed on their diagnosis, treatment and medications prior to discharge from the clinic today.  o Plan Of Care:   · Disposition  o Return Visit Request in/on 1 month +/- 2 days (98761).            Electronically Signed by: Fransisca Carver DO -Author on November 13, 2020 10:06:15 AM

## 2021-05-13 NOTE — PROGRESS NOTES
Progress Note      Patient Name: Raven Eckert   Patient ID: [id number]   Sex: Female   YOB: 1990    Primary Care Provider: Fransisca Carver DO   Referring Provider: Fransisca Carver DO    Visit Date: September 10, 2020    Provider: Fransisca Carver DO   Location: [facility]   Location Address: [address] [place] [address]  [address] [place] [address]  [id number]   Location Phone: [phone]          Chief Complaint  · 4 week follow up      History Of Present Illness  Raven Eckert is a 29 year old /White female who presents for evaluation and treatment of:      She is here today for follow-up for the management of her chronic medical conditions. She is a smoker.  She has a past medical history significant for arthritis, tobacco abuse, diabetes, bipolar, hypoglycemia, muscle cramps and seasonal allergies.    Labs 8/13/2020: TSH 2.03, free T4 1.0, hemoglobin 14.4, MCV 97.8, platelets 254, glucose 90, creatinine 0.97, GFR greater than 60, potassium 4.8, ALT 67, AST 39, iron 70, percent saturation 19%, vitamin D 32.8.    She has a past medical history significant for degenerative disc disease and granulation tissue on L5/S1 with a very small left L4/5 disc protrusion which is stable from prior MRI. She had laminectomy in January. She has chronic low back pain. She is managed by Dr Jorge A Street of pain management. She is doing PT.     She is being managed by [facility] Pain management by Jorge A Hardy.     Feels like the phentermine is helping her, she is eating smaller portions, feels more energized. However, she cannot afford the phentermine, her insurance will not cover any part of the cost.     She is also having left foot pain in the ball of her foot. Pain first started 3 week ago. Has used heating and helps sometimes. The pain is desrcibed as sore. It is stiff feeling in the morning. After being on her feet at work all day the pain is worsned and is described as  throbbing. SHe has history of left broken ankle with metal implants.               Past Medical History  Disease Name Date Onset Notes   Anxiety --  --    Arthritis --  --    Bipolar mood disorder --  --    Broken Bones 2012 --    Cataract --  --    Degenerative disc disease, lumbar --  --    Depression --  --    Diabetes --  --    Foot pain --  --    Hypoglycemia --  --    Leg pain --  --    Limb Swelling --  --    Muscle cramps --  --    Psychiatric disorder --  --    Seasonal allergies --  --          Past Surgical History  Procedure Name Date Notes   Ablation --  --    Cesarian Section --  --    Foot surgery --  --    Metal implants --  --    Minimally invasive Discectomy 2-5-20 Left L5-S1         Medication List  Name Date Started Instructions   hydrocodone-acetaminophen 7.5-300 mg oral tablet 08/19/2020 take 1 tablet by oral route every 6 hours as needed for 30 days   Latuda 20 mg oral tablet  take 1 tablet (20 mg) by oral route once daily   phentermine 15 mg oral capsule 08/20/2020 take 1 capsule (15 mg) by oral route once daily before breakfast for 15 days, then increase to 2 cap (30 mg) daily.         Allergy List  Allergen Name Date Reaction Notes   Lamictal --  --  --    SULFA (SULFONAMIDES) --  --  --          Family Medical History  Disease Name Relative/Age Notes   Diabetes, unspecified type Mother/   Mother   Arthrtis Father/  Mother/   --    Family history of certain chronic disabling diseases; arthritis Father/  Mother/   Mother; Father   Family history of Arthritis Father/  Mother/   Mother; Father   Family history of cancer Father/   Father   Family history of diabetes mellitus Mother/   Mother         Social History  Finding Status Start/Stop Quantity Notes   Alcohol Never --/-- --  --    lives with children --  --/-- --  --    Recreational Drug Use Never --/-- --  no   Tobacco Current every day --/-- 1 PPD current every day smoker, 1 packs per day, smoked 6-10 years  current every day smoker,  "0.5 pack per day, smoked 6-10 years   Working --  --/-- --  --          Review of Systems  · Constitutional  o Denies  o : fever, fatigue, weight loss, weight gain  · Cardiovascular  o Denies  o : lower extremity edema, claudication, chest pressure, palpitations  · Respiratory  o Denies  o : shortness of breath, wheezing, cough, hemoptysis, dyspnea on exertion  · Gastrointestinal  o Denies  o : nausea, vomiting, diarrhea, constipation, abdominal pain  · Neurologic  o Denies  o : altered mental status, muscular weakness, tingling or numbness  · Musculoskeletal  o Admits  o : foot pain, back pain      Vitals  Date Time BP Position Site L\R Cuff Size HR RR TEMP (F) WT  HT  BMI kg/m2 BSA m2 O2 Sat HC       09/10/2020 02:08 /103 Sitting    100 - R 18 97.9 336lbs 0oz 5'  6\" 54.23 2.66 98 %    09/10/2020 02:08 /94 Sitting                     Physical Examination  · Constitutional  o Appearance  o : morbidly obese, well developed, alert, no obvious deformities present, NAD  · Respiratory  o Respiratory Effort  o : breathing comfortably, symmetric chest rise  o Auscultation of Lungs  o : clear to asculatation bilaterally, no wheezes, rales, or rhonchii  · Cardiovascular  o Heart  o :   § Auscultation of Heart  § : regular rate and rhythm, no murmurs, rubs, or gallops  o Peripheral Vascular System  o :   § Extremities  § : no edema  · Neurologic  o Mental Status Examination  o :   § Orientation  § : grossly oriented to person, place and time  o Cranial Nerves  o : cranial nerves intact bilaterally  o Gait and Station  o :   § Gait Screening  § : normal gait  · Psychiatric  o General  o : normal mood and affect     Left foot: no noticeable defect appreciated of the left foot.               Assessment  · Left foot pain     729.5/M79.672  obtain xray today, manage according to findings  · Adult BMI 50.0-59.9 kg/sq m     V85.43/Z68.43  Diet, exercise and wt. loss was encouraged. She will be continued on Phentermine as " long as her blood pressure is not elevated at her next visit. She was told to check it at home.       Plan  · Orders  o ACO-39: Current medications updated and reviewed () - - 09/10/2020  o ACO-14: Influenza immunization was not administered for reasons documented () - - 09/10/2020  o Xray foot left [facility] ([id number]) - 729.5/M79.672 - 09/10/2020  · Medications  o Medications have been Reconciled  o Transition of Care or Provider Policy  · Instructions  o Patient was educated/instructed on their diagnosis, treatment and medications prior to discharge from the clinic today.  · Disposition  o Return Visit Request in/on 1 month +/- 2 days ([id number]).            Electronically Signed by: Fransisca Carver DO -Author on September 13, 2020 09:35:05 PM

## 2021-05-13 NOTE — PROGRESS NOTES
Progress Note      Patient Name: Raven Eckert   Patient ID: [id number]   Sex: Female   YOB: 1990    Referring Provider: Srinivasa Clarke MD    Visit Date: July 21, 2020    Provider: Marjan Chester PA-C   Location: [facility]   Location Address: [address] [place] [address]  [id number]   Location Phone: [phone]          Chief Complaint     Patient is being seen today for low back pain. Follow up from MRI.       History Of Present Illness     MRI lumbar spine showed ddd at L4/5 and L5/S1 with granulation tissue on the left at L5/S1 with laminectomy defect and very small left L4/5 disc protrusion which is stable from prior MRI.  Her left leg pain is in the posterior thigh down to the knee and the [place] given by the ER helps some.       Past Medical History  Arthritis; Diabetes; Foot pain; Hypoglycemia; Leg pain; Limb Swelling; Muscle cramps; Seasonal allergies         Past Surgical History  Ablation; Cesarian Section; Foot surgery; Metal implants; Minimally invasive Discectomy         Medication List  clonazepam 0.5 mg oral tablet; hydrocodone-acetaminophen  mg oral tablet; Latuda 20 mg oral tablet         Allergy List  Lamictal; SULFA (SULFONAMIDES)       Allergies Reconciled  Family Medical History  Diabetes, unspecified type; Arthrtis; Family history of certain chronic disabling diseases; arthritis; Family history of Arthritis; Family history of cancer; Family history of diabetes mellitus         Social History  Alcohol (Never); Alcohol Use (Never); Homemaker.; lives with children; .; Recreational Drug Use (Never); Single; Tobacco (Current every day); Working         Review of Systems  · Constitutional  o Denies  o : chills, excessive sweating, fatigue, fever, sycope/passing out, weight gain, weight loss  · Eyes  o Denies  o : changes in vision, blurry vision, double vision  · HENT  o Denies  o : loss of hearing, ringing in the ears, ear aches, sore throat,  "nasal congestion, sinus pain, nose bleeds, seasonal allergies  · Cardiovascular  o Denies  o : blood clots, swollen legs, anemia, easy burising or bleeding, transfusions  · Respiratory  o Denies  o : shortness of breath, dry cough, productive cough, pneumonia, COPD  · Gastrointestinal  o Denies  o : difficulty swallowing, reflux  · Genitourinary  o Denies  o : incontinence  · Neurologic  o Admits  o : difficulty with sleep, numbness/tingling/paresthesia   o Denies  o : headache, seizure, stroke, tremor, loss of balance, falls, dizziness/vertigo, difficulty with coordination, difficulty with dexterity, weakness  · Musculoskeletal  o Admits  o : muscle aches, joint pain, spasms, sciatica, pain radiating in leg, low back pain  o Denies  o : neck stiffness/pain, swollen lymph nodes, weakness, pain radiating in arm  · Endocrine  o Denies  o : diabetes, thyroid disorder  · Psychiatric  o Denies  o : anxiety, depression  · All Others Negative      Vitals  Date Time BP Position Site L\R Cuff Size HR RR TEMP (F) WT  HT  BMI kg/m2 BSA m2 O2 Sat        07/21/2020 12:54 PM        97.5 340lbs 7oz 5'  6\" 54.95 2.68           Physical Examination     Gait and station are wnl               Assessment  · Degeneration of lumbar intervertebral disc     722.52/M51.36  · Displacement of lumbar intervertebral disc     722.10/M51.26  · Lumbago/low back pain     724.3/M54.40  · Paresthesia     782.0/R20.2    Problems Reconciled  Plan  · Orders  o VLADIMIR Report (KASPR) - 724.3/M54.40, 722.10/M51.26 - 07/21/2020  o PHYSICAL THERAPY CONSULTATION (PHYTH) - 782.0/R20.2, 722.52/M51.36, 724.3/M54.40, 722.10/M51.26 - 07/21/2020   2-3 times a week x 4-6 weeks Evaluate and treat  · Medications  o Medications have been Reconciled  o Transition of Care or Provider Policy  · Instructions  o She does not have an acute disc herniation. She will start PT and refer to pain management and f/u here as needed.  · Associate Tasks  o Task ID [id number] " "\"''Provider to Nurse: robbin and anibal to Dr. Street for pain medication  o Task ID [id number] \"''Provider to Nurse: refer to CPA for med. management and discuss LESB            Electronically Signed by: Marjan Chester PA-C -Author on July 21, 2020 02:02:24 PM  "

## 2021-05-13 NOTE — PROGRESS NOTES
Progress Note      Patient Name: Raven Eckert   Patient ID: [id number]   Sex: Female   YOB: 1990    Primary Care Provider: Fransisca Carver DO   Referring Provider: Fransisca Carver DO    Visit Date: August 19, 2020    Provider: Fransisca Carver DO   Location: [facility]   Location Address: [address] [place] [address] [address] [place] [address]  [id number]   Location Phone: [phone]          Chief Complaint  · Pt states having sinus issues, nasal drainage feels like its getting in her chest.      History Of Present Illness  TELEHEALTH TELEPHONE VISIT  Raven Eckert is a 29 year old /White female who is presenting for evaluation via telehealth telephone visit. Verbal consent obtained before beginning visit.   Provider spent 12 minutes with patient during telehealth visit.   The following staff were present during this visit: Trudy James LPN, Dr. Fransisca Carver   Past Medical History/Overview of Patient Symptoms  Raven Eckert is a 29 year old /White female who presents for evaluation and treatment of:      She is being managed today via telephone.  She has 2 children.  She is a smoker.  She has a past medical history significant for arthritis, tobacco abuse, diabetes, bipolar, hypoglycemia, muscle cramps and seasonal allergies.    Labs 8/13/2020: TSH 2.03, free T4 1.0, hemoglobin 14.4, MCV 97.8, platelets 254, glucose 90, creatinine 0.97, GFR greater than 60, potassium 4.8, ALT 67, AST 39, iron 70, percent saturation 19%, vitamin D 32.8.    She has past medical history significant for degenerative disc disease and granulation tissue on L5/S1 with a very small left L4/5 disc protrusion which is stable from prior MRI. She had laminectomy in January. She has chronic low back pain. She is managed by Dr Ander Street of pain management. She is asking for physical therapy.     The patient is interested in losing weight.  She struggled with weight most of her life.  She denies  any illicit drug use and has never been on weight loss medication.    She is complained today that she is been having some increasing sinus congestion that is producing yellow drainage.  She complains she is also had some headaches and started to have some mild cough.  She denies any substantial fevers or chills.    She has no other complaints today denies chest pain, shortness of breath, weakness, numbness, nausea, vomiting, diarrhea dizziness or syncope.            Past Medical History  Disease Name Date Onset Notes   Anxiety --  --    Arthritis --  --    Bipolar mood disorder --  --    Broken Bones 2012 --    Cataract --  --    Degenerative disc disease, lumbar --  --    Depression --  --    Diabetes --  --    Foot pain --  --    Hypoglycemia --  --    Leg pain --  --    Limb Swelling --  --    Muscle cramps --  --    Psychiatric disorder --  --    Seasonal allergies --  --          Past Surgical History  Procedure Name Date Notes   Ablation --  --    Cesarian Section --  --    Foot surgery --  --    Metal implants --  --    Minimally invasive Discectomy 2-5-20 Left L5-S1         Medication List  Name Date Started Instructions   hydrocodone-acetaminophen 7.5-300 mg oral tablet 08/19/2020 take 1 tablet by oral route every 6 hours as needed for 30 days   Latuda 20 mg oral tablet  take 1 tablet (20 mg) by oral route once daily         Allergy List  Allergen Name Date Reaction Notes   Lamictal --  --  --    SULFA (SULFONAMIDES) --  --  --        Allergies Reconciled  Family Medical History  Disease Name Relative/Age Notes   Diabetes, unspecified type Mother/   Mother   Arthrtis Father/  Mother/   --    Family history of certain chronic disabling diseases; arthritis Father/  Mother/   Mother; Father   Family history of Arthritis Father/  Mother/   Mother; Father   Family history of cancer Father/   Father   Family history of diabetes mellitus Mother/   Mother         Social History  Finding Status Start/Stop Quantity  Notes   Alcohol Never --/-- --  --    lives with children --  --/-- --  --    Recreational Drug Use Never --/-- --  no   Tobacco Current every day --/-- 1 PPD current every day smoker, 1 packs per day, smoked 6-10 years  current every day smoker, 0.5 pack per day, smoked 6-10 years   Working --  --/-- --  --              Assessment  · Encounter for medication monitoring     V58.83/Z51.81  · Acute bacterial sinusitis       Acute sinusitis, unspecified     461.9/J01.90  · Morbid obesity     278.01/E66.01    Problems Reconciled  Plan  · Orders  o ACO-39: Current medications updated and reviewed () - - 08/19/2020  o Urine Drug Screen ([facility]) (58722) - V58.83/Z51.81 - 08/19/2020  · Medications  o azithromycin 250 mg oral tablet   SIG: take 2 tablets (500 mg) by oral route once daily for 1 day then 1 tablet (250 mg) by oral route once daily for 4 days   DISP: (6) tablets with 0 refills  Prescribed on 08/19/2020     o phentermine 15 mg oral capsule   SIG: take 1 capsule (15 mg) by oral route once daily before breakfast for 15 days, then increase to 2 cap (30 mg) daily.   DISP: (45) capsules with 0 refills  Prescribed on 08/20/2020     o Medications have been Reconciled  o Transition of Care or Provider Policy  · Instructions  o Plan Of Care:   · Disposition  o Return Visit Request in/on 1 month +/- 2 days (69966).     1.  Acute bacterial sinusitis: Patient was given a prescription today for a Z-Rustam to be taken as directed.  She was told she is feeling worse to call back or go to the ER.  2.  Morbid obesity: The patient was started on phentermine at today's visit after a [place], urine drug screen and controlled medication contract have been reviewed and scanned the chart.  Follow-up 1 month.             Electronically Signed by: Fransisca Carver DO -Author on August 28, 2020 08:09:07 AM

## 2021-05-13 NOTE — PROGRESS NOTES
Progress Note      Patient Name: Raven Eckert   Patient ID: [id number]   Sex: Female   YOB: 1990    Referring Provider: Srinivasa Clarke MD    Visit Date: June 25, 2020    Provider: Marjan Chester PA-C   Location: [facility]   Location Address: [address] [place] [address]  [id number]   Location Phone: [phone]          Chief Complaint     f/u for lbp       History Of Present Illness     She had a left L5/S1 MID in February 2020 and was doing well post operatively until around 4 weeks ago and noticed increased left sided lbp and left leg paresthesias posterior thigh down to the knee. Pain improves some with sitting and lying down and worse with standing and walking.  Denies bowel/bladder incontinence but has urinary frequency.  She is concerned she has a recurrent disc herniation.       Past Medical History  Arthritis; Diabetes; Foot pain; Hypoglycemia; Leg pain; Limb Swelling; Muscle cramps; Seasonal allergies         Past Surgical History  Ablation; Cesarian Section; Foot surgery; Metal implants; Minimally invasive Discectomy         Medication List  clonazepam 0.5 mg oral tablet; Latuda 20 mg oral tablet         Allergy List  Lamictal; SULFA (SULFONAMIDES)       Allergies Reconciled  Family Medical History  Diabetes, unspecified type; Arthrtis; Family history of certain chronic disabling diseases; arthritis; Family history of Arthritis; Family history of cancer; Family history of diabetes mellitus         Social History  Alcohol (Never); Alcohol Use (Never); Homemaker.; lives with children; .; Recreational Drug Use (Never); Single; Tobacco (Current every day); Working         Review of Systems  · Constitutional  o Denies  o : chills, excessive sweating, fatigue, fever, sycope/passing out, weight gain, weight loss  · Eyes  o Denies  o : changes in vision, blurry vision, double vision  · HENT  o Denies  o : loss of hearing, ringing in the ears, ear aches, sore  "throat, nasal congestion, sinus pain, nose bleeds, seasonal allergies  · Cardiovascular  o Denies  o : blood clots, swollen legs, anemia, easy burising or bleeding, transfusions  · Respiratory  o Denies  o : shortness of breath, dry cough, productive cough, pneumonia, COPD  · Gastrointestinal  o Denies  o : difficulty swallowing, reflux  · Genitourinary  o Denies  o : incontinence  · Neurologic  o Denies  o : headache, seizure, stroke, tremor, loss of balance, falls, dizziness/vertigo, difficulty with sleep, numbness/tingling/paresthesia , difficulty with coordination, difficulty with dexterity, weakness  · Musculoskeletal  o Admits  o : muscle aches, spasms, pain radiating in leg, low back pain  o Denies  o : neck stiffness/pain, swollen lymph nodes, joint pain, weakness, sciatica, pain radiating in arm  · Endocrine  o Denies  o : diabetes, thyroid disorder  · Psychiatric  o Denies  o : anxiety, depression  · All Others Negative      Vitals  Date Time BP Position Site L\R Cuff Size HR RR TEMP (F) WT  HT  BMI kg/m2 BSA m2 O2 Sat        06/25/2020 12:42 PM        96.3 340lbs 4oz 5'  6\" 54.92 2.68           Physical Examination  · Constitutional  o Appearance  o : well-nourished, well developed, alert, in no acute distress  · Respiratory  o Respiratory Effort  o : breathing unlabored  · Cardiovascular  o Peripheral Vascular System  o :   § Extremities  § : no cyanosis, clubbing or edema; less than 2 second refill noted  · Neurologic  o Mental Status Examination  o :   § Orientation  § : grossly oriented to person, place and time  o Motor Examination  o :   § RLE Strength  § : strength normal  § RLE Motor Function  § : tone normal, no atrophy, no abnormal movements noted  § LLE Strength  § : strength normal  § LLE Motor Function  § : tone normal, no atrophy, no abnormal movements noted  o Reflexes  o :   § RLE  § : 1/4  § LLE  § : 1/4  o Sensation  o :   § Light Touch  § : sensation intact to light touch in " extremities  o Gait and Station  o :   § Gait Screening  § : normal gait  · Psychiatric  o Mood and Affect  o : mood normal, affect appropriate          Assessment  · Degeneration of lumbar intervertebral disc     722.52/M51.36  · Lumbago/low back pain     724.3/M54.40  · Lumbar Spinal Stenosis     724.02/M48.06  L5/S1  · Lumbosacral disc herniation, L5-S1     722.10/M51.27  left paracentral (now s/p MID)  · Paresthesia     782.0/R20.2    Problems Reconciled  Plan  · Orders  o MRI lumbar spine w/w/o contrst (25384) - 722.10/M51.27, 724.02/M48.06, 782.0/R20.2, 724.3/M54.40 - 06/25/2020   history of left L5/S1 MID in 2020 of February   [facility] bore at [facility]  o VLADIMIR Report (KASPR) - 722.10/M51.27, 724.02/M48.06, 782.0/R20.2, 722.52/M51.36 - 06/25/2020  · Medications  o Medications have been Reconciled  o Transition of Care or Provider Policy  · Instructions  o I will order post operative imaging and f/u to discuss results.   · Associate Tasks  o Task ID [id number] General Task: vladimir and fwsamia to Dr. Street for valium prior to MRI            Electronically Signed by: Marjan Chester PA-C -Author on June 25, 2020 01:33:43 PM

## 2021-05-14 VITALS
TEMPERATURE: 97.3 F | SYSTOLIC BLOOD PRESSURE: 137 MMHG | HEART RATE: 99 BPM | HEIGHT: 66 IN | RESPIRATION RATE: 20 BRPM | BODY MASS INDEX: 47.09 KG/M2 | WEIGHT: 293 LBS | OXYGEN SATURATION: 97 % | DIASTOLIC BLOOD PRESSURE: 56 MMHG

## 2021-05-14 VITALS
BODY MASS INDEX: 47.09 KG/M2 | WEIGHT: 293 LBS | TEMPERATURE: 97.9 F | OXYGEN SATURATION: 99 % | HEIGHT: 66 IN | RESPIRATION RATE: 18 BRPM | HEART RATE: 100 BPM | SYSTOLIC BLOOD PRESSURE: 146 MMHG | DIASTOLIC BLOOD PRESSURE: 76 MMHG

## 2021-05-14 VITALS
WEIGHT: 293 LBS | HEART RATE: 100 BPM | OXYGEN SATURATION: 99 % | HEIGHT: 66 IN | SYSTOLIC BLOOD PRESSURE: 126 MMHG | DIASTOLIC BLOOD PRESSURE: 70 MMHG | TEMPERATURE: 98.1 F | BODY MASS INDEX: 47.09 KG/M2

## 2021-05-14 VITALS
SYSTOLIC BLOOD PRESSURE: 137 MMHG | HEIGHT: 66 IN | WEIGHT: 293 LBS | HEART RATE: 84 BPM | OXYGEN SATURATION: 96 % | TEMPERATURE: 97.8 F | DIASTOLIC BLOOD PRESSURE: 86 MMHG | BODY MASS INDEX: 47.09 KG/M2

## 2021-05-14 VITALS
OXYGEN SATURATION: 98 % | DIASTOLIC BLOOD PRESSURE: 103 MMHG | SYSTOLIC BLOOD PRESSURE: 144 MMHG | TEMPERATURE: 97.9 F | BODY MASS INDEX: 47.09 KG/M2 | RESPIRATION RATE: 18 BRPM | HEIGHT: 66 IN | WEIGHT: 293 LBS | HEART RATE: 100 BPM

## 2021-05-14 VITALS — WEIGHT: 293 LBS

## 2021-05-14 NOTE — PROGRESS NOTES
Progress Note      Patient Name: Raven Eckert   Patient ID: [id number]   Sex: Female   YOB: 1990    Primary Care Provider: Fransisca Carver DO   Referring Provider: Fransisca Carver DO    Visit Date: February 12, 2021    Provider: Fransisca Carver DO   Location: [facility]   Location Address: [address] [place] [address]  [id number]   Location Phone: [phone]          Chief Complaint  · Discuss medications      History Of Present Illness  Raven Eckert is a 30 year old /White female who presents for evaluation and treatment of:   TELEHEALTH TELEPHONE VISIT  Raven Ekcert is a 30 year old /White female who is presenting for evaluation via telehealth telephone visit. Verbal consent obtained before beginning visit.   Provider spent 11 minutes with patient during telehealth visit.   The following staff were present during this visit: Aaliyah Ackerman MA. Fransisca Carver DO.   Past Medical History/Overview of Patient Symptoms     She is being managed today via telephone for her binge eating disorder. She has 2 children.  She is a smoker.  She has a past medical history significant for arthritis, tobacco abuse, diabetes, bipolar, hypoglycemia, muscle cramps and seasonal allergies.    Labs 12/9/2020: hemoglobin 13.1, MCV 92.6, platelet 309, potassium 5.1, GFR greater than 60, creatinine 0.87.    Labs 8/13/2020: TSH 2.03, free T4 1.0, hemoglobin 14.4, MCV 97.8, platelets 254, glucose 90, creatinine 0.97, GFR greater than 60, potassium 4.8, ALT 67, AST 39, iron 70, percent saturation 19%, vitamin D 32.8.    She has a past medical history significant for degenerative disc disease and granulation tissue on L5/S1 with a very small left L4/5 disc protrusion which is stable from prior MRI. She had laminectomy in January. She has chronic low back pain. She is managed by Dr Jorge A Street of pain management. She is doing PT.     She is being managed by [facility]  management by Jorge A Hardy. Per visit report from 8/19/2020 the patient is planned to have steroid injections to L4-5, L5-S1. She is also presribed [facility] for pain.    She was not able to get the Vyvanse due to cost. She was able to talk to the dietitian but has not gotten a scheduled appointment. She says that her wt. is stable to slightly increased.     She is still smoking.     She has no other complaints today denies chest pain, shortness of breath, weakness, numbness, nausea, vomiting, diarrhea dizziness or syncope.                     Past Medical History  Disease Name Date Onset Notes   Anxiety --  --    Arthritis --  --    Bipolar mood disorder --  --    Broken Bones 2012 --    Cataract --  --    Degenerative disc disease, lumbar --  --    Depression --  --    Diabetes --  --    Foot pain --  --    Heel pain --  --    Hypoglycemia --  --    Leg pain --  --    Limb Swelling --  --    Muscle cramps --  --    Numbness in feet --  --    Psychiatric disorder --  --    Seasonal allergies --  --          Past Surgical History  Procedure Name Date Notes   Ablation --  --    Cesarian Section --  --    Foot surgery --  --    Hysterectomy --  --    Metal implants --  --    Minimally invasive Discectomy 2-5-20 Left L5-S1         Medication List  Name Date Started Instructions   estradiol 0.5 mg oral tablet  take 1 tablet (0.5 mg) by oral route once daily   Klonopin 0.5 mg oral tablet 12/16/2020 take 0.5 mg prn   Latuda 40 mg oral tablet 09/17/2020 take 1 tablet daily         Allergy List  Allergen Name Date Reaction Notes   Lamictal --  --  --    SULFA (SULFONAMIDES) --  --  --          Family Medical History  Disease Name Relative/Age Notes   Diabetes, unspecified type Mother/   Mother   Arthrtis Father/  Mother/   --    Family history of certain chronic disabling diseases; arthritis Father/  Mother/   Mother; Father   Family history of Arthritis Father/  Mother/   Mother; Father   Family history of cancer  Father/   Father   Family history of diabetes mellitus Mother/   Mother         Social History  Finding Status Start/Stop Quantity Notes   Alcohol Never --/-- --  --    lives with children --  --/-- --  --    Recreational Drug Use Never --/-- --  no   Tobacco Current every day --/-- 1 PPD current every day smoker, 1 packs per day, smoked 6-10 years  current every day smoker, 0.5 pack per day, smoked 6-10 years   Working --  --/-- --  --                  Assessment  · Obesity     278.00/E66.9  Diet, exercise weight loss and dietitian appointment was discussed today's visit. The patient was given another prescription of Vyvanse sent to a different pharmacy.  · Binge eating disorder     307.50/F50.81  The patient was educated about discount savings cards by the Vyvanse . She was encouraged to follow-up with dietitian. She was encouraged to diet at home and keep her weight written down for her next visit.  · Tobacco abuse     305.1/Z72.0  Tobacco cessation will be addressed at her next visit.      Plan  · Orders  o ACO-14: Influenza immunization was not administered for reasons documented [facility] () - - 02/12/2021   Patient declined   o ACO-39: Current medications updated and reviewed (1159F, ) - - 02/12/2021  · Medications  o Vyvanse 30 mg oral capsule   SIG: take 1 capsule (30 mg) by oral route once daily in the morning for 30 days   DISP: (30) Capsule with 0 refills  Prescribed on 02/12/2021     o Medications have been Reconciled  o Transition of Care or Provider Policy  · Instructions  o Patient was educated/instructed on their diagnosis, treatment and medications prior to discharge from the clinic today.  o Plan Of Care:   · Disposition  o Follow up in 1 month            Electronically Signed by: Fransisca Carver DO -Author on February 12, 2021 10:23:42 AM

## 2021-05-14 NOTE — PROGRESS NOTES
Progress Note      Patient Name: Raven Eckert   Patient ID: [id number]   Sex: Female   YOB: 1990    Primary Care Provider: Fransisca Carver DO   Referring Provider: Fransisca Carver DO    Visit Date: December 16, 2020    Provider: Fransisca Carver DO   Location: [facility]   Location Address: [address] [place] [address]  [id number]   Location Phone: [phone]          Chief Complaint  · Follow up   · Discuss weight loss medication       History Of Present Illness  TELEHEALTH TELEPHONE VISIT  Raven Eckert is a 30 year old /White female who is presenting for evaluation via telehealth telephone visit. Verbal consent obtained before beginning visit.   Provider spent 11 minutes with patient during telehealth visit.   The following staff were present during this visit: Karen RAMIREZ , Dr.Kerry Mehul MORALES.   Past Medical History/Overview of Patient Symptoms  Raven Eckert is a 30 year old /White female who presents for evaluation and treatment of:      She is being managed via telephone.     She has 2 children.  She is a smoker.  She has a past medical history significant for arthritis, tobacco abuse, diabetes, bipolar, hypoglycemia, muscle cramps and seasonal allergies.    Labs 8/13/2020: TSH 2.03, free T4 1.0, hemoglobin 14.4, MCV 97.8, platelets 254, glucose 90, creatinine 0.97, GFR greater than 60, potassium 4.8, ALT 67, AST 39, iron 70, percent saturation 19%, vitamin D 32.8.    Since her starting on phentermine she has lost 12 pounds.  She is concerned that she has not lost enough weight.  She is want to continue it and denies any side effect.    She is having some occasional breakthrough anxiety and panic attack.  She does occasionally use Klonopin 0.5 mg and says that 15 tablets will last for up to 2-3 months.  She only takes it when she is having acute bout of anxiety.    She is one week post total hysterectomy.    She has no other complaints today  denies chest pain, shortness of breath, weakness, numbness, nausea, vomiting, diarrhea dizziness or syncope.                Past Medical History  Disease Name Date Onset Notes   Anxiety --  --    Arthritis --  --    Bipolar mood disorder --  --    Broken Bones 2012 --    Cataract --  --    Degenerative disc disease, lumbar --  --    Depression --  --    Diabetes --  --    Foot pain --  --    Heel pain --  --    Hypoglycemia --  --    Leg pain --  --    Limb Swelling --  --    Muscle cramps --  --    Numbness in feet --  --    Psychiatric disorder --  --    Seasonal allergies --  --          Past Surgical History  Procedure Name Date Notes   Ablation --  --    Cesarian Section --  --    Foot surgery --  --    Hysterectomy --  --    Metal implants --  --    Minimally invasive Discectomy 2-5-20 Left L5-S1         Medication List  Name Date Started Instructions   estradiol 0.5 mg oral tablet  take 1 tablet (0.5 mg) by oral route once daily   Latuda 40 mg oral tablet 09/17/2020 take 1 tablet daily   Vyvanse 30 mg oral capsule 02/12/2021 take 1 capsule (30 mg) by oral route once daily in the morning for 30 days         Allergy List  Allergen Name Date Reaction Notes   Lamictal --  --  --    SULFA (SULFONAMIDES) --  --  --          Family Medical History  Disease Name Relative/Age Notes   Diabetes, unspecified type Mother/   Mother   Arthrtis Father/  Mother/   --    Family history of certain chronic disabling diseases; arthritis Father/  Mother/   Mother; Father   Family history of Arthritis Father/  Mother/   Mother; Father   Family history of cancer Father/   Father   Family history of diabetes mellitus Mother/   Mother         Social History  Finding Status Start/Stop Quantity Notes   Alcohol Never --/-- --  --    lives with children --  --/-- --  --    Recreational Drug Use Never --/-- --  no   Tobacco Current every day --/-- 1 PPD current every day smoker, 1 packs per day, smoked 6-10 years  current every day  smoker, 0.5 pack per day, smoked 6-10 years   Working --  --/-- --  --          Vitals  Date Time BP Position Site L\R Cuff Size HR RR TEMP (F) WT  HT  BMI kg/m2 BSA m2 O2 Sat FR L/min FiO2 HC       2020 11:08 AM         328lbs 0oz                        Assessment  · Obesity     278.00/E66.9  The patient was given an increase in her phentermine to 37-1/2 mg daily. She was encouraged to follow a strict diet. She was encouraged to count her calories. She will be brought back for 1 month for follow-up.  · Generalized anxiety disorder with panic attacks       Generalized anxiety disorder     300.02/F41.1  Panic disorder [episodic paroxysmal anxiety]     300.02/F41.0  The patient was given a refill on her Klonopin as needed.  · Encounter for weight management     V65.49/Z76.89  We will follow-up in 1 month. Weight loss counseling was implemented at today's visit.      Plan  · Orders  o ACO-39: Current medications updated and reviewed (1159F, ) - - 2020  o ACO-14: Influenza immunization was not administered for reasons documented [facility] (82584) - - 2020   patient declined   · Medications  o Klonopin 0.5 mg oral tablet   SIG: take 0.5 mg prn   DISP: (15) Tablet with 0 refills  Prescribed on 2020     o phentermine 37.5 mg oral capsule   SI po qd   DISP: (30) Capsule with 0 refills  Adjusted on 2020     o Medications have been Reconciled  o Transition of Care or Provider Policy  · Instructions  o Plan Of Care:   · Disposition  o Return Visit Request in/on 1 month +/- 2 days ([id number]).            Electronically Signed by: Fransisca Carver DO -Author on 2021 01:17:08 PM

## 2021-05-14 NOTE — PROGRESS NOTES
Progress Note      Patient Name: Raven Eckert   Patient ID: [id number]   Sex: Female   YOB: 1990    Primary Care Provider: Fransisca Carver DO   Referring Provider: Fransisca Carver DO    Visit Date: April 8, 2021    Provider: Dionicio Castellon DO   Location: [facility]   Location Address: [address] [place] [address]  [id number]   Location Phone: [phone]          Chief Complaint  · 2 week follow up   · labwork      History Of Present Illness  Raven Eckert is a 30 year old /White female who presents for evaluation and treatment of:        Presents today to go over blood work, glucose was elevated has been checking at home we gave her a monitor and ranging from greater than 100 usually to as high as 300 at times, we did not check an A1c but advised that we would do so and have her see diabetic education if she want to start Metformin to treat which is likely diabetes and wants to do that.    No chest pain palpitations headache fever is more fatigued anything       Past Medical History  Disease Name Date Onset Notes   Anxiety --  --    Arthritis --  --    Bipolar mood disorder --  --    Broken Bones 2012 --    Cataract --  --    Degenerative disc disease, lumbar --  --    Depression --  --    Foot pain --  --    Heel pain --  --    Hypoglycemia --  --    Leg pain --  --    Limb Swelling --  --    Muscle cramps --  --    Numbness in feet --  --    Psychiatric disorder --  --    Seasonal allergies --  --          Past Surgical History  Procedure Name Date Notes   Ablation --  --    Cesarian Section --  --    Foot surgery --  --    Hysterectomy --  --    Metal implants --  --    Minimally invasive Discectomy 2-5-20 Left L5-S1         Medication List  Name Date Started Instructions   estradiol 0.5 mg oral tablet  take 1 tablet (0.5 mg) by oral route once daily   Klonopin 0.5 mg oral tablet 12/16/2020 take 0.5 mg prn   Latuda 40 mg oral tablet 09/17/2020 take 1  "tablet daily         Allergy List  Allergen Name Date Reaction Notes   Lamictal --  --  --    SULFA (SULFONAMIDES) --  --  --        Allergies Reconciled  Family Medical History  Disease Name Relative/Age Notes   Diabetes, unspecified type Mother/   Mother   Arthrtis Father/  Mother/   --    Family history of certain chronic disabling diseases; arthritis Father/  Mother/   Mother; Father   Family history of Arthritis Father/  Mother/   Mother; Father   Family history of cancer Father/   Father   Family history of diabetes mellitus Mother/   Mother         Social History  Finding Status Start/Stop Quantity Notes   Alcohol Never --/-- --  --    lives with children --  --/-- --  --    Recreational Drug Use Never --/-- --  no   Tobacco Current every day --/-- --  vapor   Working --  --/-- --  --          Review of Systems  · Constitutional  o * See HPI  · Eyes  o * See HPI  · HENT  o * See HPI  · Breasts  o * See HPI  · Cardiovascular  o * See HPI  · Respiratory  o * See HPI  · Gastrointestinal  o * See HPI  · Genitourinary  o * See HPI  · Integument  o * See HPI  · Neurologic  o * See HPI  · Musculoskeletal  o * See HPI  · Endocrine  o * See HPI  · Psychiatric  o * See HPI  · Heme-Lymph  o * See HPI  · Allergic-Immunologic  o * See HPI      Vitals  Date Time BP Position Site L\R Cuff Size HR RR TEMP (F) WT  HT  BMI kg/m2 BSA m2 O2 Sat FR L/min FiO2 HC       04/08/2021 08:20 /56 Sitting    99 - R 20 97.3 356lbs 2oz 5'  6\" 57.48 2.74 97 %  21%          Physical Examination  · Constitutional  o Appearance  o : no acute distress, well-nourished  · Head and Face  o Head  o :   § Inspection  § : atraumatic, normocephalic  · Eyes  o Eyes  o : extraocular movements intact, no scleral icterus, no conjunctival injection  · Ears, Nose, Mouth and Throat  o Ears  o :   § External Ears  § : normal  o Nose  o :   § Intranasal Exam  § : nares patent  o Oral Cavity  o :   § Oral Mucosa  § : moist mucous " membranes  · Respiratory  o Respiratory Effort  o : breathing comfortably, symmetric chest rise  o Auscultation of Lungs  o : clear to asculatation bilaterally, no wheezes, rales, or rhonchii  · Cardiovascular  o Heart  o :   § Auscultation of Heart  § : regular rate and rhythm, no murmurs, rubs, or gallops  o Peripheral Vascular System  o :   § Extremities  § : no edema  · Neurologic  o Mental Status Examination  o :   § Orientation  § : grossly oriented to person, place and time  o Gait and Station  o :   § Gait Screening  § : normal gait  · Psychiatric  o General  o : normal mood and affect              Assessment  · Prediabetes     790.29/R73.03  · Hypertriglyceridemia     272.1/E78.1  · Dyslipidemia     272.4/E78.5  · Obesity, morbid, BMI 50 or higher     278.01/E66.01       Refer to diabetes care team  Glucose meter and strips sent to pharmacy  Metformin 850 daily can increase to twice daily if tolerated  Advised strongly diet modifications and weight loss  Encouraged to follow-up in 2 to 4 weeks to review  Will refer and see specialist in a week to help modify diet more focused education       Plan  · Orders  o Hgb A1c [facility] ([id number]) - 790.29/R73.03 - 04/08/2021   can do in a few months or per specialist  o ACO-14: Influenza immunization was not administered for reasons documented [facility] () - - 04/08/2021   pt declined flu vaccine  o ACO-39: Current medications updated and reviewed (1159F, ) - 790.29/R73.03, 272.1/E78.1, 272.4/E78.5, 278.01/E66.01 - 04/08/2021  o ENDOCRINOLOGY CONSULTATION (ENDOC) - 790.29/R73.03, 272.1/E78.1, 272.4/E78.5, 278.01/E66.01 - 04/08/2021  o Diabetes Self-Management Education Consultation DSME [facility] (10 total hours) ([facility]) - 790.29/R73.03, 272.1/E78.1, 272.4/E78.5, 278.01/E66.01 - 04/08/2021  · Medications  o Saxenda 3 mg/0.5 mL (18 mg/3 mL) subcutaneous pen injector   SIG: inject 3 mg by subcutaneous route once daily in the abdomen, thigh, or upper arm for 30 days   DISP: (1)  Package with 2 refills  Discontinued on 04/08/2021     o Medications have been Reconciled  o Transition of Care or Provider Policy  · Instructions  o Handouts were given to patient:   o Patient is taking medications as prescribed and doing well.   o Take all medications as prescribed/directed.  o Patient was educated/instructed on their diagnosis, treatment and medications prior to discharge from the clinic today.  o Patient instructed to seek medical attention urgently for new or worsening symptoms.  o Trusted Web sites were provided.  o Call the office with any concerns or questions.  o Bring all medicines with their bottles to each office visit.  o Risks, benefits, and alternatives were discussed with the patient. The patient is aware of risks associated with:  o Chronic conditions reviewed and taken into consideration for today's treatment plan.  o Electronically Identified Patient Education Materials Provided Electronically  · Disposition  o Call or Return if symptoms worsen or persist.  o Labs before follow up ordered  o Reviewed chart labs and imaging prior to and during encounter, updated  o Return Visit Request in/on 2 weeks +/- 7 days ([id number]).            Electronically Signed by: Dionicio Castellon DO -Author on April 8, 2021 09:07:19 AM

## 2021-05-14 NOTE — PROGRESS NOTES
Progress Note      Patient Name: Raven Eckert   Patient ID: [id number]   Sex: Female   YOB: 1990    Primary Care Provider: Fransisca Carver DO   Referring Provider: Fransisca Carver DO    Visit Date: March 25, 2021    Provider: Dionicio Castellon DO   Location: [facility]   Location Address: [address] [place] [address]  [id number]   Location Phone: [phone]          Chief Complaint  · Blood Pressure running high, hormones out of whack states had hysterectomy in December 2020 states thinks could be thyroid, Blood sugar been running low and having hot flashes. States she feels drained all the time.       History Of Present Illness  Raven Eckert is a 30 year old /White female who presents for evaluation and treatment of:        Then and and and and and andPatient presents complaining of blood pressure running elevated and fatigued wants labs done to further evaluate she says her blood pressures been running much higher advised to keep a log at home denies any headache vision change loss or other       Past Medical History  Disease Name Date Onset Notes   Anxiety --  --    Arthritis --  --    Bipolar mood disorder --  --    Broken Bones 2012 --    Cataract --  --    Degenerative disc disease, lumbar --  --    Depression --  --    Foot pain --  --    Heel pain --  --    Hypoglycemia --  --    Leg pain --  --    Limb Swelling --  --    Muscle cramps --  --    Numbness in feet --  --    Psychiatric disorder --  --    Seasonal allergies --  --          Past Surgical History  Procedure Name Date Notes   Ablation --  --    Cesarian Section --  --    Foot surgery --  --    Hysterectomy --  --    Metal implants --  --    Minimally invasive Discectomy 2-5-20 Left L5-S1         Medication List  Name Date Started Instructions   estradiol 0.5 mg oral tablet  take 1 tablet (0.5 mg) by oral route once daily   Klonopin 0.5 mg oral tablet 12/16/2020 take 0.5 mg prn   Latuda 40 mg  "oral tablet 09/17/2020 take 1 tablet daily   Saxenda 3 mg/0.5 mL (18 mg/3 mL) subcutaneous pen injector 03/05/2021 inject 3 mg by subcutaneous route once daily in the abdomen, thigh, or upper arm for 30 days         Allergy List  Allergen Name Date Reaction Notes   Lamictal --  --  --    SULFA (SULFONAMIDES) --  --  --        Allergies Reconciled  Family Medical History  Disease Name Relative/Age Notes   Diabetes, unspecified type Mother/   Mother   Arthrtis Father/  Mother/   --    Family history of certain chronic disabling diseases; arthritis Father/  Mother/   Mother; Father   Family history of Arthritis Father/  Mother/   Mother; Father   Family history of cancer Father/   Father   Family history of diabetes mellitus Mother/   Mother         Social History  Finding Status Start/Stop Quantity Notes   Alcohol Never --/-- --  --    lives with children --  --/-- --  --    Recreational Drug Use Never --/-- --  no   Tobacco Current every day --/-- 1 PPD current every day smoker, 1 packs per day, smoked 6-10 years  current every day smoker, 0.5 pack per day, smoked 6-10 years   Working --  --/-- --  --          Review of Systems  · Constitutional  o Admits  o : fatigue  o Denies  o : fever, weight loss, weight gain  · HENT  o Denies  o : sinus pain, nasal congestion, nasal discharge, postnasal drip  · Cardiovascular  o Denies  o : lower extremity edema, claudication, chest pressure, palpitations  · Respiratory  o Denies  o : shortness of breath, wheezing, cough, hemoptysis, dyspnea on exertion  · Gastrointestinal  o Denies  o : nausea, vomiting, diarrhea, constipation, abdominal pain  · Integument  o Denies  o : rash, itching  · Psychiatric  o Denies  o : anxiety, depression      Vitals  Date Time BP Position Site L\R Cuff Size HR RR TEMP (F) WT  HT  BMI kg/m2 BSA m2 O2 Sat FR L/min FiO2 HC       03/25/2021 01:27 /70 Sitting    100 - R  98.1 351lbs 6oz 5'  6\" 56.71 2.72 99 %  21%          Physical " Examination  · Constitutional  o Appearance  o : no acute distress, well-nourished  · Head and Face  o Head  o :   § Inspection  § : atraumatic, normocephalic  · Eyes  o Eyes  o : extraocular movements intact, no scleral icterus, no conjunctival injection  · Ears, Nose, Mouth and Throat  o Ears  o :   § External Ears  § : normal  o Nose  o :   § Intranasal Exam  § : nares patent  o Oral Cavity  o :   § Oral Mucosa  § : moist mucous membranes  · Respiratory  o Respiratory Effort  o : breathing comfortably, symmetric chest rise  o Auscultation of Lungs  o : clear to asculatation bilaterally, no wheezes, rales, or rhonchii  · Cardiovascular  o Heart  o :   § Auscultation of Heart  § : regular rate and rhythm, no murmurs, rubs, or gallops  o Peripheral Vascular System  o :   § Extremities  § : no edema  · Neurologic  o Mental Status Examination  o :   § Orientation  § : grossly oriented to person, place and time  o Gait and Station  o :   § Gait Screening  § : normal gait  · Psychiatric  o General  o : normal mood and affect          Assessment  · Anemia     285.9/D64.9  · Fatigue     780.79/R53.83  · Hot flashes     782.62/R23.2  · Low blood sugar     251.2/E16.2       Labs today to review condition change he feels like her hormones may be relatable also order LH FSH keep a blood pressure log at home and follow-up in 2 weeks sooner if concerns       Plan  · Orders  o B12 Folate levels (B12FO) - 780.79/R53.83 - 03/25/2021  o Free T4 (73948) - 780.79/R53.83, 782.62/R23.2, 251.2/E16.2 - 03/25/2021  o Physical, Primary Care Panel (CBC, CMP, Lipid, TSH) [place] (86694, 35798, 76495, 00247) - 780.79/R53.83, 782.62/R23.2, 251.2/E16.2 - 03/25/2021  o ACO-39: Current medications updated and reviewed (1159F, ) - 780.79/R53.83, 782.62/R23.2, 251.2/E16.2, 285.9/D64.9 - 03/25/2021  o Serum transferrin measurement (71627) - 780.79/R53.83, 782.62/R23.2, 251.2/E16.2, 285.9/D64.9 - 03/25/2021  o Serum or plasma ferritin measurement  (44559) - 780.79/R53.83, 782.62/R23.2, 251.2/E16.2, 285.9/D64.9 - 03/25/2021  o Iron level (01981) - 780.79/R53.83, 782.62/R23.2, 251.2/E16.2, 285.9/D64.9 - 03/25/2021  o B12 Folate levels (B12FO) - 780.79/R53.83, 782.62/R23.2, 251.2/E16.2, 285.9/D64.9 - 03/25/2021  o Thyroid antibody panel (68950) - 780.79/R53.83, 782.62/R23.2, 251.2/E16.2, 285.9/D64.9 - 03/25/2021  o LH (22978) - 780.79/R53.83, 782.62/R23.2, 251.2/E16.2, 285.9/D64.9 - 03/25/2021  o FSH (87813) - 780.79/R53.83, 782.62/R23.2, 251.2/E16.2, 285.9/D64.9 - 03/25/2021  · Instructions  o Patient was educated/instructed on their diagnosis, treatment and medications prior to discharge from the clinic today.  o Risks, benefits, and alternatives were discussed with the patient. The patient is aware of risks associated with:  o Chronic conditions reviewed and taken into consideration for today's treatment plan.  · Disposition  o Call or Return if symptoms worsen or persist.  o Return Visit Request in/on 2 weeks +/- 7 days (29955).            Electronically Signed by: Dionicio Castellon DO -Author on March 25, 2021 04:44:34 PM

## 2021-05-14 NOTE — PROGRESS NOTES
Progress Note      Patient Name: Raven Eckert   Patient ID: [id number]   Sex: Female   YOB: 1990    Primary Care Provider: Fransisca Carver DO   Referring Provider: Fransisca Carver DO    Visit Date: March 5, 2021    Provider: Fransisca Carver DO   Location: [facility]   Location Address: [address] [place] [address]  [id number]   Location Phone: [phone]          Chief Complaint  · Discuss Saxenda       History Of Present Illness  Raven Eckert is a 30 year old /White female who presents for evaluation and treatment of:   Past Medical History/Overview of Patient Symptoms  Video Conferencing Visit  Raven Eckert is a 30 year old /White female who is presenting for evaluation via video conferencing via FORMAT OF VIDEO VISIT. Verbal consent obtained before beginning visit.   The following staff were present during this visit: Aaliyah Ackerman MA. Fransisca Carver DO      She is being managed today via zoom for her binge eating disorder. She has 2 children.  She is a smoker.  She has a past medical history significant for arthritis, tobacco abuse, bipolar, hypoglycemia, muscle cramps and seasonal allergies.    Labs 12/9/2020: hemoglobin 13.1, MCV 92.6, platelet 309, potassium 5.1, GFR greater than 60, creatinine 0.87.    Labs 8/13/2020: TSH 2.03, free T4 1.0, hemoglobin 14.4, MCV 97.8, platelets 254, glucose 90, creatinine 0.97, GFR greater than 60, potassium 4.8, ALT 67, AST 39, iron 70, percent saturation 19%, vitamin D 32.8.    She has a past medical history significant for degenerative disc disease and granulation tissue on L5/S1 with a very small left L4/5 disc protrusion which is stable from prior MRI. She had laminectomy in January. She has chronic low back pain. She is managed by Dr Jorge A Street of pain management. She is doing PT.     She is being managed by [facility] Pain management by Jorge A Hardy.     She tried phentermine and was not  able to lose wt. so it was W[place]'ed. She was not able to get Vyvanse due to cost. She would like to try Saxenda at today's visit. She says that her wt. is stable to mildly increased.    She is still smoking.     She has no other complaints today denies chest pain, shortness of breath, weakness, numbness, nausea, vomiting, diarrhea dizziness or syncope.              Past Medical History  Disease Name Date Onset Notes   Anxiety --  --    Arthritis --  --    Bipolar mood disorder --  --    Broken Bones 2012 --    Cataract --  --    Degenerative disc disease, lumbar --  --    Depression --  --    Diabetes --  --    Foot pain --  --    Heel pain --  --    Hypoglycemia --  --    Leg pain --  --    Limb Swelling --  --    Muscle cramps --  --    Numbness in feet --  --    Psychiatric disorder --  --    Seasonal allergies --  --          Past Surgical History  Procedure Name Date Notes   Ablation --  --    Cesarian Section --  --    Foot surgery --  --    Hysterectomy --  --    Metal implants --  --    Minimally invasive Discectomy 2-5-20 Left L5-S1         Medication List  Name Date Started Instructions   estradiol 0.5 mg oral tablet  take 1 tablet (0.5 mg) by oral route once daily   Klonopin 0.5 mg oral tablet 12/16/2020 take 0.5 mg prn   Latuda 40 mg oral tablet 09/17/2020 take 1 tablet daily         Allergy List  Allergen Name Date Reaction Notes   Lamictal --  --  --    SULFA (SULFONAMIDES) --  --  --          Family Medical History  Disease Name Relative/Age Notes   Diabetes, unspecified type Mother/   Mother   Arthrtis Father/  Mother/   --    Family history of certain chronic disabling diseases; arthritis Father/  Mother/   Mother; Father   Family history of Arthritis Father/  Mother/   Mother; Father   Family history of cancer Father/   Father   Family history of diabetes mellitus Mother/   Mother         Social History  Finding Status Start/Stop Quantity Notes   Alcohol Never --/-- --  --    lives with children --   --/-- --  --    Recreational Drug Use Never --/-- --  no   Tobacco Current every day --/-- 1 PPD current every day smoker, 1 packs per day, smoked 6-10 years  current every day smoker, 0.5 pack per day, smoked 6-10 years   Working --  --/-- --  --          Review of Systems  · Constitutional  o * See HPI  · Cardiovascular  o * See HPI  · Respiratory  o * See HPI  · Gastrointestinal  o * See HPI  · Neurologic  o * See HPI  · Musculoskeletal  o * See HPI  · Psychiatric  o * See HPI      Physical Examination  · Constitutional  o Appearance  o : no acute distress, well-nourished  · Respiratory  o Respiratory Effort  o : breathing comfortably, symmetric chest rise  o Auscultation of Lungs  o : clear to asculatation bilaterally, no wheezes, rales, or rhonchii  · Neurologic  o Mental Status Examination  o :   § Orientation  § : grossly oriented to person, place and time  o Gait and Station  o :   § Gait Screening  § : normal gait  · Psychiatric  o General  o : normal mood and affect              Assessment  · Morbid obesity     278.01/E66.01  She will be given a trial of Saxenda. She was encouraged to follow-up with dietary counseling.   · Weight disorder     783.9/R63.8  She was given a prescription of Saxenda and told to follow-up with dietary counseling.       Plan  · Orders  o ACO-14: Influenza immunization was not administered for reasons documented [facility] () - - 03/05/2021  o ACO-39: Current medications updated and reviewed (, 1159F) - - 03/05/2021  · Medications  o Saxenda 3 mg/0.5 mL (18 mg/3 mL) subcutaneous pen injector   SIG: inject 3 mg by subcutaneous route once daily in the abdomen, thigh, or upper arm for 30 days   DISP: (1) Package with 2 refills  Prescribed on 03/05/2021     o Medications have been Reconciled  o Transition of Care or Provider Policy  · Instructions  o Patient was educated/instructed on their diagnosis, treatment and medications prior to discharge from the clinic today.  o Risks,  benefits, and alternatives were discussed with the patient. The patient is aware of risks associated with:  o Chronic conditions reviewed and taken into consideration for today's treatment plan.  o Plan Of Care:   · Disposition  o Follow up in 1 month            Electronically Signed by: Fransisca Carver DO -Author on March 27, 2021 08:53:56 AM

## 2021-05-14 NOTE — PROGRESS NOTES
Progress Note      Patient Name: Raven Eckert   Patient ID: [id number]   Sex: Female   YOB: 1990    Primary Care Provider: Fransisca Carver DO   Referring Provider: Fransisca Carver DO    Visit Date: January 18, 2021    Provider: Fransisca Carver DO   Location: [facility]   Location Address: [address] [place] [address]  [id number]   Location Phone: [phone]          Chief Complaint  · Follow up   · Discuss other weight loss medications   · rash on both arms eczema and under right eye       History Of Present Illness  Raven Eckert is a 30 year old /White female who presents for evaluation and treatment of:      She is being managed for her chronic medical conditions. She has 2 children.  She is a smoker.  She has a past medical history significant for arthritis, tobacco abuse, diabetes, bipolar, hypoglycemia, muscle cramps and seasonal allergies.    Labs 12/9/2020: hemoglobin 13.1, MCV 92.6, platelet 309, potassium 5.1, GFR greater than 60, creatinine 0.87.    Labs 8/13/2020: TSH 2.03, free T4 1.0, hemoglobin 14.4, MCV 97.8, platelets 254, glucose 90, creatinine 0.97, GFR greater than 60, potassium 4.8, ALT 67, AST 39, iron 70, percent saturation 19%, vitamin D 32.8.    She has a past medical history significant for degenerative disc disease and granulation tissue on L5/S1 with a very small left L4/5 disc protrusion which is stable from prior MRI. She had laminectomy in January. She has chronic low back pain. She is managed by Dr Jorge A Street of pain management. She is doing PT.     She is being managed by [facility] Pain management by Jorge A Hardy. Per visit report from 8/19/2020 the patient is planned to have steroid injections to L4-5, L5-S1. She is also presribed [place] for pain.    She had a hysterectomy since her last visit and is doing well.     She has gained 9 lbs since her last visit. She did not do well on phentermine and is wanting to try other  medication for wt. loss.     She is complaining of a rash on her arms and under her right eye. Triamcinolone cream is not helping.     She is still smoking.     She has no other complaints today denies chest pain, shortness of breath, weakness, numbness, nausea, vomiting, diarrhea dizziness or syncope.                Past Medical History  Disease Name Date Onset Notes   Anxiety --  --    Arthritis --  --    Bipolar mood disorder --  --    Broken Bones 2012 --    Cataract --  --    Degenerative disc disease, lumbar --  --    Depression --  --    Diabetes --  --    Foot pain --  --    Heel pain --  --    Hypoglycemia --  --    Leg pain --  --    Limb Swelling --  --    Muscle cramps --  --    Numbness in feet --  --    Psychiatric disorder --  --    Seasonal allergies --  --          Past Surgical History  Procedure Name Date Notes   Ablation --  --    Cesarian Section --  --    Foot surgery --  --    Hysterectomy --  --    Metal implants --  --    Minimally invasive Discectomy 2-5-20 Left L5-S1         Medication List  Name Date Started Instructions   estradiol 0.5 mg oral tablet  take 1 tablet (0.5 mg) by oral route once daily   Klonopin 0.5 mg oral tablet 12/16/2020 take 0.5 mg prn   Latuda 40 mg oral tablet 09/17/2020 take 1 tablet daily   naproxen 500 mg oral tablet 10/05/2020 take 1 tablet by oral route 2 times a day as needed for 30 days         Allergy List  Allergen Name Date Reaction Notes   Lamictal --  --  --    SULFA (SULFONAMIDES) --  --  --          Family Medical History  Disease Name Relative/Age Notes   Diabetes, unspecified type Mother/   Mother   Arthrtis Father/  Mother/   --    Family history of certain chronic disabling diseases; arthritis Father/  Mother/   Mother; Father   Family history of Arthritis Father/  Mother/   Mother; Father   Family history of cancer Father/   Father   Family history of diabetes mellitus Mother/   Mother         Social History  Finding Status Start/Stop Quantity  "Notes   Alcohol Never --/-- --  --    lives with children --  --/-- --  --    Recreational Drug Use Never --/-- --  no   Tobacco Current every day --/-- 1 PPD current every day smoker, 1 packs per day, smoked 6-10 years  current every day smoker, 0.5 pack per day, smoked 6-10 years   Working --  --/-- --  --          Review of Systems  · Constitutional  o * See HPI  · HENT  o * See HPI  · Cardiovascular  o * See HPI  · Respiratory  o * See HPI  · Gastrointestinal  o * See HPI  · Integument  o * See HPI  · Neurologic  o * See HPI  · Musculoskeletal  o * See HPI  · Endocrine  o * See HPI  · Psychiatric  o * See HPI      Vitals  Date Time BP Position Site L\R Cuff Size HR RR TEMP (F) WT  HT  BMI kg/m2 BSA m2 O2 Sat FR L/min FiO2 HC       01/18/2021 01:50 /76 Sitting    100 - R 18 97.9 337lbs 0oz 5'  6\" 54.39 2.67 99 %  21%          Physical Examination  · Constitutional  o Appearance  o : morbidly obese, well developed, alert, no obvious deformities present, NAD  · Head and Face  o Head  o :   § Inspection  § : atraumatic, normocephalic  · Ears, Nose, Mouth and Throat  o Ears  o :   § External Ears  § : normal  o Nose  o :   § Intranasal Exam  § : nares patent  o Oral Cavity  o :   § Oral Mucosa  § : moist mucous membranes  · Respiratory  o Respiratory Effort  o : breathing comfortably, symmetric chest rise  o Auscultation of Lungs  o : clear to asculatation bilaterally, no wheezes, rales, or rhonchii  · Cardiovascular  o Heart  o :   § Auscultation of Heart  § : regular rate and rhythm, no murmurs, rubs, or gallops  o Peripheral Vascular System  o :   § Extremities  § : no edema  · Skin and Subcutaneous Tissue  o General Inspection  o : erythematous rash on arms and under right eye  · Neurologic  o Mental Status Examination  o :   § Orientation  § : grossly oriented to person, place and time  o Cranial Nerves  o : cranial nerves intact bilaterally  o Gait and Station  o :   § Gait Screening  § : normal " gait  · Psychiatric  o General  o : normal mood and affect              Assessment  · Tobacco abuse counseling       Tobacco abuse counseling     V65.42/Z71.6  Tobacco cessation was highly encouraged.   · Eczema     692.9/L30.9  She was given a referral to dermatology.   · Adult BMI 50.0-59.9 kg/sq m     V85.43/Z68.43  She will be given a referral for a dietitian. She was given a prescription of Vyvance to be taken as directed to help curb appetite.       Plan  · Orders  o ACO-17: Screened for tobacco use AND received tobacco cessation intervention (4004F) - V65.42/Z71.6 - 2021  o ACO-14: Influenza immunization was not administered for reasons documented [facility] () - - 2021  o ACO-39: Current medications updated and reviewed (, 1159F) - - 2021  o DERMATOLOGY CONSULTATION (DERMA) - 692.9/L30.9 - 2021  o DIETERY CONSULTATION (DIETE) - V85.43/Z68.43 - 2021  · Medications  o Vyvanse 30 mg oral capsule   SIG: take 1 capsule (30 mg) by oral route once daily in the morning for 30 days   DISP: (30) Capsule with 0 refills  Prescribed on 2021     o phentermine 37.5 mg oral capsule   SI po qd   DISP: (30) Capsule with 0 refills  Discontinued on 2021     o Medications have been Reconciled  o Transition of Care or Provider Policy  · Instructions  o Tobacco and smoking cessation counseling for more than 3 minutes was completed.  o Patient was educated/instructed on their diagnosis, treatment and medications prior to discharge from the clinic today.  · Disposition  o Return Visit Request in/on 1 month +/- 2 days (23857).            Electronically Signed by: Fransisca Carver DO - on 2021 11:44:38 AM

## 2021-05-15 VITALS
OXYGEN SATURATION: 100 % | DIASTOLIC BLOOD PRESSURE: 58 MMHG | BODY MASS INDEX: 47.09 KG/M2 | RESPIRATION RATE: 16 BRPM | HEIGHT: 66 IN | SYSTOLIC BLOOD PRESSURE: 119 MMHG | TEMPERATURE: 97.5 F | HEART RATE: 91 BPM | WEIGHT: 293 LBS

## 2021-05-15 VITALS
WEIGHT: 293 LBS | BODY MASS INDEX: 47.09 KG/M2 | SYSTOLIC BLOOD PRESSURE: 156 MMHG | DIASTOLIC BLOOD PRESSURE: 91 MMHG | HEIGHT: 66 IN

## 2021-05-15 VITALS
BODY MASS INDEX: 47.09 KG/M2 | WEIGHT: 293 LBS | HEIGHT: 66 IN | SYSTOLIC BLOOD PRESSURE: 151 MMHG | DIASTOLIC BLOOD PRESSURE: 78 MMHG

## 2021-05-15 VITALS — HEIGHT: 66 IN | BODY MASS INDEX: 47.09 KG/M2 | WEIGHT: 293 LBS | TEMPERATURE: 96.3 F

## 2021-05-15 VITALS — BODY MASS INDEX: 47.09 KG/M2 | WEIGHT: 293 LBS | TEMPERATURE: 97.5 F | HEIGHT: 66 IN

## 2021-05-16 VITALS — BODY MASS INDEX: 40.18 KG/M2 | WEIGHT: 250 LBS | OXYGEN SATURATION: 97 % | HEIGHT: 66 IN | HEART RATE: 75 BPM

## 2021-05-18 NOTE — PROGRESS NOTES
Raven Hagen  1990     Office/Outpatient Visit    Visit Date: Fri, Rich 3, 2020 11:02 am    Provider: Srinivasa Clarke MD (Assistant: Samara Ruffin MA)    Location: [facility]        Electronically signed by Srinivasa Clarke MD on  01/03/2020 09:45:49 PM                             Subjective:        CC: Ms. Hagen is a 29 year old White female.  presents today due to sciatica         HPI:       Pt has long-stand lower back pain, this flared up after completing taser training about 4 months ago. She went to walk in clinic and was given a prednisone taper and this relieved pain for about 3 months. Back pain flared 1 month ago. Pain is in left lower back and radiates to her left foot. Back pain is throbbing, burning and feels tense. Also feels like muscle tightness. She gets loss of feeling in her leg, and reports left leg weakness. Pain in leg is burning and stabbing. No bowel or bladder incontinence. She does have urinary frequency and occassional stress incontinence. No saddle anesthesia. Pain is better if she lays flat on her back with a rolled up towel for back support. Nothing makes it better. 2/10/17 Moderate degenerative disc disease at L5-S1. She thinks she did PT about 4-5 years ago but only did 1 session as this made her pain worse. Has never seen a neurosurgeon.    ROS:     CONSTITUTIONAL:  Negative for fatigue and fever.      CARDIOVASCULAR:  Negative for chest pain and palpitations.      RESPIRATORY:  Negative for recent cough and dyspnea.      GASTROINTESTINAL:  Negative for abdominal pain, constipation, diarrhea, nausea and vomiting.      GENITOURINARY:  Positive for urinary incontinence and stress incontinence frequent urination.      MUSCULOSKELETAL:  Positive for back pain and limb pain ( left leg pain ).   Negative for arthralgias or myalgias.      INTEGUMENTARY/BREAST:  Negative for atypical mole(s) and rash.      NEUROLOGICAL:  Positive for paresthesias.    "Negative for weakness.      PSYCHIATRIC:  Negative for anxiety, depression and sleep disturbance.          Past Medical History / Family History / Social History:         Last Reviewed on 2020 11:42 AM by Srinivasa Clarke    Past Medical History:                 PAST MEDICAL HISTORY         Fracture(s): left ankle;     hypoglycemia     Obesity    PCOS         GYNECOLOGICAL HISTORY:        Problems with pregnancy have included pre ecclampsia.      Contraception: S/P tubal ligation;         Surgical History:         : X 2;     Tubal Ligation: 2012;     left ankle fx repair;         Family History:         Positive for Congestive Heart Failure ( pat. GF ).      Positive for Osteoarthritis ( \"both sides.\" ).      Positive for Eczema ( brother; pat. GF ).      Positive for Bipolar Disorder ( mother; mat. GF; mat. GM ).  Father: Myocardial Infarction     Mother: Bipolar Disorder     Brother(s): 1 brother(s) total;  ADHD     Son(s): 1 son(s) total;  nut allergies;  Anxiety; ADHD     Daughter(s): 1 daughter(s) total     Paternal Grandfather:  at age 54; Cause of death was MI     Paternal Grandmother: heart issues     Maternal Grandfather: Bipolar Disorder     Maternal Grandmother: Myocardial Infarction;  PCOS;  Anxiety; Depression         Social History:     Occupation: Unemployed     Marital Status: remarried, seperated     Children: 2 children         Tobacco/Alcohol/Supplements:     Last Reviewed on 2020 11:42 AM by Srinivasa Clarke    Tobacco: Current Smoker: She currently smokes every day, 1/2 pack per day.          Alcohol: Frequency:    rarely;     Caffeine:  She admits to consuming caffeine via soda ( 4 servings per day ).          Substance Abuse History:     Last Reviewed on 2020 11:42 AM by Srinivasa Clarke    None         Mental Health History:     Last Reviewed on 2020 11:42 AM by Srinivasa Clarke        Communicable Diseases (eg STDs):     Last Reviewed on 2020 11:42 " AM by Srinivasa Clarke        Current Problems:     Last Reviewed on 1/03/2020 11:42 AM by Srinivasa Clarke    Meralgia paresthetica, unspecified lower limb    Low back pain    Stress, not elsewhere classified    Other atopic dermatitis    Radiculopathy, lumbosacral region    Encounter for screening for other disorder    Essential (primary) hypertension        Immunizations:     None        Allergies:     Last Reviewed on 1/03/2020 11:42 AM by Srinivasa Clarke    Celexa:   (Adverse Reaction)    Paxil:   (Adverse Reaction)    Sulfas:      Lamictal:          Current Medications:     Last Reviewed on 1/03/2020 11:42 AM by Srinivasa Clarke    Ventolin HFA 90mcg/1actuation Oral Inhaler [PRN]    Latuda 20mg Tablet [Take 1 tablet(s) by mouth daily with food.]    Clonazepam 0.5mg Tablet [1 q AM prn]        Objective:        Vitals:         Current: 1/3/2020 11:03:35 AM    Ht:  5 ft, 5.5 in;  Wt: 312.4 lbs;  BMI: 51.2T: 97.3 F (oral);  BP: 125/70 mm Hg (right arm, sitting);  P: 81 bpm (right arm (BP Cuff), sitting);  sCr: 1.1 mg/dL;  GFR: 103.25        Exams:     PHYSICAL EXAM:     GENERAL: vital signs recorded - well developed, well nourished,  morbidly obese;  well groomed;  no apparent distress, tearful;     EYES: extraocular movements intact; conjunctiva and cornea are normal; PERRLA;     E/N/T: OROPHARYNX:  normal mucosa, dentition, gingiva, and posterior pharynx;     RESPIRATORY: normal respiratory rate and pattern with no distress; normal breath sounds with no rales, rhonchi, wheezes or rubs;     CARDIOVASCULAR: normal rate; rhythm is regular;  no systolic murmur; no edema;     GASTROINTESTINAL: nontender; normal bowel sounds;     MUSCULOSKELETAL: normal gait; normal overall tone Straight leg raise positive on left; TTP of left lower back; Straight Leg Raise negative right     NEUROLOGIC: mental status: alert and oriented x 3;     PSYCHIATRIC: appropriate affect and demeanor; normal psychomotor function; normal speech  pattern; normal thought and perception;         Assessment:         M54.17   Radiculopathy, lumbosacral region       788.41   Frequent urination   (Mild)         ORDERS:         Meds Prescribed:       [New Rx] baclofen 10 mg oral tablet [take 1 tablet (10 mg) by oral route 2 times per day as needed ], #60 (sixty) tablets, Refills: 0 (zero)         Radiology/Test Orders:       [id number]  Radiologic examination, spine, lumbosacral;  minimum of four views  (Send-Out)              Procedures Ordered:       REFER  Referral to Specialist or Other Facility  (Send-Out)                      Plan:         Radiculopathy, lumbosacral regionPresentation concerning for either radiculopathy, muscle strain/spasm of left lower back, DDD or combination of all of these. Straight leg raise is positive on left but this could correlate with muscle tightness as opposed to a true radiculopathy. X-ray is ordered today and pt is referred to PT and neurosurgery (the latter per her request). Baclofen is ordered for muscle spasm.         RADIOLOGY:  I have ordered Lumbar/Sacral Spine X-ray to be done today.      REFERRALS:  Referral initiated to a neurosurgeon and physical therapy.            Prescriptions:       [New Rx] baclofen 10 mg oral tablet [take 1 tablet (10 mg) by oral route 2 times per day as needed ], #60 (sixty) tablets, Refills: 0 (zero)           Orders:       [id number]  Radiologic examination, spine, lumbosacral;  minimum of four views  (Send-Out)            REFER  Referral to Specialist or Other Facility  (Send-Out)                  Charge Capture:         Primary Diagnosis:     M54.17  Radiculopathy, lumbosacral region           Orders:      [id number]  Office/outpatient visit; established patient, level 3  (In-House)              788.41  Frequent urination
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
## 2021-05-18 NOTE — PROGRESS NOTES
"Raven HagenCompa 1990     Office/Outpatient Visit    Visit Date:  01:19 pm    Provider: Ubaldo Bailey MD (Assistant: Nazanin Warner RN)    Location: [facility]        Electronically signed by Ubaldo Bailey MD on  2018 02:12:19 PM                             SUBJECTIVE:        CC:     Ms. Hagen is a 27 year old White female.  Wants to discuss PT for long term back pain;         HPI:         Ms. Hagen presents with low back pain.  The discomfort is most prominent in the lumbar spine.  This radiates to the left buttock and thighs.  She characterizes it as constant, moderate in intensity, aching, dull, and WAXES AND WANES.  This is a chronic problem, with essentially constant pain.  She states that the current episode of pain started years ago.  She does not recall any precipitating event or injury.  Associated symptoms include numbness in the left thigh and weakness of the left upper leg.  She notes some pain relief with muscle relaxants.  Other details: X-RAYS SHOWED DJD, DID NOT GO TO P.T..      ROS:     CONSTITUTIONAL:  Negative for chills and fever.      INTEGUMENTARY/BREAST:  Positive for rash ECZEMA HAS FLARED.      NEUROLOGICAL:  Positive for paresthesia ( \"BURNING\" IN LATERAL LEFT THIGH ).      PSYCHIATRIC:  Positive for anxiety ( (IMPROVED WITH LEXAPRO) ).          PMH/FMH/SH:     Last Reviewed on 2018 01:33 PM by Ubaldo Bailey    Past Medical History:                 PAST MEDICAL HISTORY         Fracture(s): left ankle;         GYNECOLOGICAL HISTORY:        Problems with pregnancy have included pre ecclampsia.      Contraception: S/P tubal ligation;         Surgical History:         : X 2;     Tubal Ligation: ;      left ankle fx repair;         Family History:         Positive for Congestive Heart Failure ( pat. GF ).      Positive for Osteoarthritis ( \"both sides.\" ).      Positive for Eczema ( brother; pat. GF ).      Positive for " Bipolar Disorder ( mother; mat. GF; mat. GM ).  Father: Myocardial Infarction     Mother: Bipolar Disorder     Brother(s): 1 brother(s) total;  ADHD     Son(s): 1 son(s) total;  nut allergies;  Anxiety; ADHD     Daughter(s): 1 daughter(s) total     Paternal Grandfather:  at age 54; Cause of death was MI     Paternal Grandmother: heart issues     Maternal Grandfather: Bipolar Disorder     Maternal Grandmother: Myocardial Infarction;  PCOS;  Anxiety; Depression         Social History:     Occupation: Unemployed     Marital Status:      Children: 2 children         Tobacco/Alcohol/Supplements:     Last Reviewed on 2018 01:32 PM by Ubaldo Bailey    Tobacco: Current Smoker: She currently smokes every day, 1/2 pack per day.          Alcohol: Frequency:    rarely;     Caffeine:  She admits to consuming caffeine via soda ( 4 servings per day ).          Substance Abuse History:     Last Reviewed on 2018 01:32 PM by Ubaldo Bailey    None             Current Problems:     Last Reviewed on 2018 01:34 PM by Ubaldo Bailey    Anxiety     Low back pain     Cigarette smoking     Lateral cutaneous femoral nerve compression         Immunizations:     None        Allergies:     Last Reviewed on 2018 01:32 PM by Ubaldo Bailey    Celexa: (Adverse Reaction)    Paxil: (Adverse Reaction)    Sulfas:    Lamictal:        Current Medications:     Last Reviewed on 2018 01:33 PM by Ubaldo Bailey    Lexapro 10mg Tablet 1 tab daily         OBJECTIVE:        Vitals:         Current: 2018 1:21:32 PM    Ht:  5 ft, 5.5 in;  Wt: 254.8 lbs;  BMI: 41.8    T: 99 F (oral);  BP: 146/77 mm Hg (left arm, sitting);  P: 105 bpm (left arm (BP Cuff), sitting);  sCr: 0.7 mg/dL;  GFR: 151.40        Repeat:     1:26:58 PM     BP:   120/80mm Hg (left arm, sitting)         Exams:     PHYSICAL EXAM:     GENERAL: vital signs recorded - well developed, well nourished;  no apparent  distress;     BREAST/INTEGUMENT: a rash is noted on the upper extremities;  it is best characterized as papular;     NEUROLOGIC: GROSSLY INTACT     PSYCHIATRIC:  appropriate affect and demeanor; normal speech pattern; grossly normal memory; LOW BACK examination: Inspection: normal;     Palpation: lumbar tenderness;     Neuro-vascular: DECREASED SENSATION LEFT LATERAL THIGH;     Muscular Strength: normal;     Range of Motion: LAROM;     Maneuvers: (+/-) left straight leg raise.              ASSESSMENT           724.2   M54.17  Chronic low back pain              DDx:     300.02   Z73.3  Anxiety              DDx:     691.8   L20.89  Eczema              DDx:         ORDERS:         Meds Prescribed:       Refill of: Lexapro (Escitalopram Oxalate) 10mg Tablet 1 tab daily  #30 (Thirty) tablet(s) Refills: 2       Meloxicam 15mg Tablet Take 1 tablet(s) by mouth daily  #30 (Thirty) tablet(s) Refills: 2       Cyclobenzaprine HCl 10mg Tablet 1 tablet AT HS PRN  #30 (Thirty) tablet(s) Refills: 2       Hydrocortisone Valerate 0.2% Cream APPLY BID PRN  #60 (Sixty) gm Refills: 1         Procedures Ordered:       REFER  Referral to Specialist or Other Facility  (Send-Out)                   PLAN:          Chronic low back pain         RECOMMENDATIONS given include: alternating cold packs and moist heat.      REFERRALS:  Referral initiated to physical therapy.      CONSIDER MRI           Prescriptions:       Meloxicam 15mg Tablet Take 1 tablet(s) by mouth daily  #30 (Thirty) tablet(s) Refills: 2       Cyclobenzaprine HCl 10mg Tablet 1 tablet AT HS PRN  #30 (Thirty) tablet(s) Refills: 2           Orders:       REFER  Referral to Specialist or Other Facility  (Send-Out)            Anxiety           Prescriptions:       Refill of: Lexapro (Escitalopram Oxalate) 10mg Tablet 1 tab daily  #30 (Thirty) tablet(s) Refills: 2          Eczema           Prescriptions:       Hydrocortisone Valerate 0.2% Cream APPLY BID PRN  #60 (Sixty) gm Refills:  1             CHARGE CAPTURE           **Please note: ICD descriptions below are intended for billing purposes only and may not represent clinical diagnoses**        Primary Diagnosis:         724.2 Chronic low back pain            M54.17    Radiculopathy, lumbosacral region              Orders:          [id number]   Office/outpatient visit; established patient, level 4  (In-House)           300.02 Anxiety            Z73.3    Stress, not elsewhere classified    691.8 Eczema            L20.89    Other atopic dermatitis
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
## 2021-05-28 VITALS
OXYGEN SATURATION: 97 % | HEIGHT: 66 IN | DIASTOLIC BLOOD PRESSURE: 70 MMHG | HEART RATE: 92 BPM | WEIGHT: 293 LBS | SYSTOLIC BLOOD PRESSURE: 110 MMHG | RESPIRATION RATE: 20 BRPM | TEMPERATURE: 97.8 F | BODY MASS INDEX: 47.09 KG/M2

## 2021-05-28 NOTE — PROGRESS NOTES
Patient: BRIGITTE MAY     Acct: [id number]     Report: #[id number]  UNIT #: [id number]     : 1990    Encounter Date:2021  PRIMARY CARE: ERICA STEVENS  ***ISignkentrell***  --------------------------------------------------------------------------------------------------------------------  Date of Encounter      May 6, 2021            Chief Complaint      HYPERTRIGLYCERIDEMIA, OBESITY, PREDIABETES, DYSLIPIDEMIA            Referring Providers/Copies To      Referring Provider:  LANDRY QUEEN DO      Copies To:   ERICA STEVENS            Allergies      Coded Allergies:             LAMOTRIGINE (Verified  Allergy, Unknown, HIVES, 20)           SULFA (SULFONAMIDE ANTIBIOTICS) (Verified  Allergy, Unknown, HIVES,     20)            Medications      Last Reconciled on 21 1:42 pm by NAVI MARC      HYDROcodone-Acetaminophen 7.5-325 Mg (HYDROcodone-Acetaminophen 7.5-325 Mg) 1     Each Tablet      1 TAB PO QDAY PRN for PAIN, TAB 0 Refills         Reported         21       metFORMIN HCl (metFORMIN HCl) 850 Mg Tablet      850 MG PO QDAY, #30 TAB 0 Refills         Reported         21       Estradiol (Estrace) 0.5 Mg Tablet      0.5 MG PO QDAY, TAB         Reported         21       clonazePAM (klonoPIN) 0.5 Mg Tablet      0.5 MG PO TID PRN for ANXIETY, #90 TAB 0 Refills         Reported         20       Lurasidone HCl (Latuda) 40 Mg Tab      40 MG PO QDAY, TAB         Reported         12/3/20            Vital Signs      Height 5 ft 6 in / 167.64 cm      Weight 355 lbs 6.104 oz / 161.2 kg      BSA 2.55 m2      BMI 57.4 kg/m2      Temperature 97.8 F / 36.56 C - Temporal      Pulse 92      Respirations 20      Blood Pressure 110/70 Sitting, Right Arm      Pulse Oximetry 97%, ROOM AIR      Blood Glucose Value:  97 (Finger Stick)            Eye Exam      When was your last eye exam?:        2019      Where was it done?:        [facility]            Pain Score      Experiencing any  "pain?:  Yes      Pain Scale Used:  Numerical      Pain Intensity:  3      Pain Comment:        Back            Preventative      Hx Influenza Vaccination:  No      Influenza Vaccine Declined:  Yes      Have You Had 2 or More Falls i:  No      Have You Had a Fall with an In:  No      Hx Pneumococcal Vaccination:  No      Encouraged to follow-up with:  PCP regarding preventative exams.      Chart initiated by:      Ariadne Goldman MA            HPI - Diabetes      This patient is seen in the office today for diabetes medication management.      The patient has been referred to our office by her primary care provider.  She     states approximately 6 weeks ago she started feeling extremely tired with \"zero     energy\" so she went to her PCP for evaluation.  She requested a blood glucose     meter at that visit and noted that her glucose levels initially low and then 1     week later became high and were running in the 300s at times.  A Hemoglobin A1c     was collected on the patient however it was within normal limits.  The patient     states she has had a longstanding history of hypoglycemia since her early 20s.      She states that she experienced severe hypoglycemia during a past pregnancy as     well.  She would have to eat or drink something constantly to prevent     hypoglycemia.  She has a positive history of PCOS with endometriosis and     underwent hysterectomy in December 2020.  When she has hypoglycemia she reports     levels in the 50s and 60s.  She indicates on most occasion she will become     clammy with shaking but she also indicates there are other times she does not     feel the hypoglycemia.  Her PCP started her on metformin 850 mg once a day and     she does note glucose levels improved after starting this medication.            The patient has a classic appearance of insulin resistance with BMI of 57.4,     history of PCOS, and acanthosis nigracans in the neck area.  The patient also     states she " has had a 100 pound weight gain over the last year.  She is unable to    correlate it to any particular dietary change or activity change.  Her provider     has tried to start her on phentermine for weight loss as well as Saxenda but her    insurance would not approve the Saxenda.  Despite the normal A1c, most likely     the patient has type 2 diabetes given the high glucose levels she is reporting.     The A1c may not yet be reflecting the elevated glucose values since the onset     has been so recent.            Most Recent Lab Findings      Most Recent HGA1C      Her most recent A1c was collected on 2021 and was 5.6% indicating     normal value            Item Value  Date Time             Hemoglobin A1c 5.6 % 21 0805            Diabetes Type:  Type 2 (The current A1c does not reflect diagnosis however     random glucose levels are her indicative of type 2 diabetes; additional testing     will be done)      Diabetes Complications:  None      Hospitalizations 2nd to DM?:  No      ER/911 Secondary to DM:  No      Dietary Habits:  3 Meals daily, Sugary Drinks (Mountain Dew and Iced Coffee)      Exercise:  None (has back issues)      BG Monitoring:  Meter      Frequency:  Times per day (1 time a day on most days)      Blood Glucose Range:        Her blood glucose meter was uploaded for a 30-day period of time and      indicates an average glucose of 152 mg/dL with a high of 265 mg/dL and a      low of 99 mg/dL.  This is based on 15 glucose values during this      timeframe.  60% of glucose levels are greater than 180 mg/dL while 40%      fall within target range of 70 to 180 mg/dL.  She is experiencing fasting      hyperglycemia as well as postprandial hyperglycemia.            PAST,FAMILY,   Past Medical History      Past Medical History:  Anxiety, Arthritis (left ankle and back), Depression            Past Surgical History      Past Surgical History:   Section (x2), Hysterectomy (total),  Spinal     Surgery (Minimally Invasive Discectomy- Left L5- S1)      Other Past Surgical History      Left Foot Surgery with hardware insertion and removal; Ablation            Social History      Marrital Status:   ()      Lives independently:  No      Number of Children:  2      Occupation:              Tobacco Use      Smoking status:  Former smoker      Smoking packs/day:  1      Smoking history:  10-25 pack years            Vaping      Currently Vaping:  Yes (daily)            Alcohol Use      None            Substance Use      Substance Use:  Denies Use            Review of Sytems      General:  No Appetite Changes, No Fatigue, No Weight Loss; Weight Gain (100 lbs     over the past year; has tried weight loss medications); No Weakness      Eyes:  No Blurred Vision; Corrective Lenses; No Vision Changes, No Vision Loss      Cardiovascular:  No Chest Pain, No Palpitations, No Peripheral edema      Gastrointestinal:  No Constipation, No Diarrhea, No Nausea/Vomiting      Integumentary:  No Lesions, No Rash, No Wounds      Neurologic:  No Extremity Pain, No Numbness, No Tingling, No Headache, No     Dizziness      Psychiatric:  No Anxiety, No Depression, No Stress      Endocrine:  Hypoglycemia (about 1 time a week); No Hypo Unawareness, No     Nocturnal Hypo; Polyuria; No Polyphagia; Polydipsia      ENT:  No Hearing loss, No Sinusitis, No Difficulty swallowing      Respiratory:  No Shortness of breath, No Wheezing, No Cough      Genitourinary:  No UTI, No Vaginal yeast infections, No Difficulty urinating, No    Incontinence      Musculoskeletal:  Joint pain; No Muscle pain; Back pain            Exam      Constitutional:  Awake and Alert, Appropriately dressed/groomed; Not Acutely     Distressed      Eyes:  No Scleral Icterus; Intact EOM; No Eyelid swelling      Cardiovascular:  No Peripheral Edema; Normal Chest Appearance      Musculoskeletal:  Steady Gait, Normal Strength, Normal  ROM,       Respiratory:  No Respiratory Distress, No Cyanosis, No Accessory Muscle use      Skin:  No Blisters, No Cuts\Scrapes; Acanthosis Nigricans (In the neck region);     No DM Dermopathy, No Fungus, No NLD, No Rash, No Vitiligo      Psychiatric:  Appropriate Affect, Intact Judgement, Oriented x 3,       Other      When discussing application of the professional continuous glucose sensor the     patient became tearful regarding anxiety of needles      ENMT:  Nose/ears normal, Normal hearing      Extremities:  No Cyanosis, No Edema; Normal temperature; No Deformity            Impression      Type 2 diabetes, anxiety, depression, arthritis of the back, class III obesity            Diagnosis      Type 2 diabetes mellitus with hyperglycemia - E11.65            Notes      New Diagnostics      * C Peptide, Routine         Dx: Type 2 diabetes mellitus with hyperglycemia - E11.65      * Glucose Fasting, Routine         Dx: Type 2 diabetes mellitus with hyperglycemia - E11.65      * Insulin Serum (Fasti, Routine         Dx: Type 2 diabetes mellitus with hyperglycemia - E11.65      * Isletcell Ab/Glutamic Ac Decar, Routine         Dx: Type 2 diabetes mellitus with hyperglycemia - E11.65      New Office Procedures      * POINT OF CARE BG, Routine         Dx: Type 2 diabetes mellitus with hyperglycemia - E11.65      New Referrals      * DM Self Management Nutrition, Routine         SILVER CLEMENS with Diabetes Self Managment Ed         Dx: Type 2 diabetes mellitus with hyperglycemia - E11.65            Plan      1) although she is most likely type 2 diabetes with insulin resistance we will     collect a C-peptide with fasting glucose, fasting insulin level, and lemuel     antibody to determine type I versus type II status            2) the patient will be scheduled with the dietitian for nutrition counseling for    both weight loss as well as glucose control.            3) the patient was placed on a 51wan professional  continuous glucose sensor.      She will wear this device for 14 days.  She is to monitor her glucose levels a     minimum of 2 times a day while on the sensor and record them on the log sheet     provided.            4) she will be scheduled for follow-up evaluation in 3 weeks where we will     review her labs, sensor report, and make additional treatment decisions based on    those findings.            Pain Plan      Follow-up with PCP/Pain Management            Patient Education      Patient Education Provided:  Yes            Topics Patient Counseled on      Topics Patient Counseled on:  Meds, Monitoring, Diet, Hypo Prevention            Referrals      Dietician            Electronically signed by NAVI MARC  05/06/2021 13:42       Disclaimer: Converted document may not contain table formatting or lab diagrams. Please see [employer] for the authenticated document.

## 2021-07-01 VITALS
WEIGHT: 254.8 LBS | SYSTOLIC BLOOD PRESSURE: 120 MMHG | DIASTOLIC BLOOD PRESSURE: 80 MMHG | TEMPERATURE: 99 F | HEIGHT: 66 IN | HEART RATE: 105 BPM | BODY MASS INDEX: 40.95 KG/M2

## 2021-07-01 VITALS
TEMPERATURE: 98.1 F | HEIGHT: 66 IN | HEART RATE: 72 BPM | BODY MASS INDEX: 42.61 KG/M2 | WEIGHT: 265.1 LBS | SYSTOLIC BLOOD PRESSURE: 132 MMHG | DIASTOLIC BLOOD PRESSURE: 76 MMHG

## 2021-07-01 VITALS
HEART RATE: 108 BPM | TEMPERATURE: 98.4 F | BODY MASS INDEX: 46.06 KG/M2 | HEIGHT: 66 IN | SYSTOLIC BLOOD PRESSURE: 144 MMHG | DIASTOLIC BLOOD PRESSURE: 70 MMHG | WEIGHT: 286.6 LBS

## 2021-07-01 VITALS
WEIGHT: 293 LBS | HEART RATE: 92 BPM | HEIGHT: 66 IN | SYSTOLIC BLOOD PRESSURE: 129 MMHG | TEMPERATURE: 98.1 F | BODY MASS INDEX: 47.09 KG/M2 | DIASTOLIC BLOOD PRESSURE: 60 MMHG

## 2021-07-02 VITALS
HEART RATE: 81 BPM | TEMPERATURE: 97.3 F | SYSTOLIC BLOOD PRESSURE: 125 MMHG | BODY MASS INDEX: 47.09 KG/M2 | DIASTOLIC BLOOD PRESSURE: 70 MMHG | WEIGHT: 293 LBS | HEIGHT: 66 IN

## 2021-07-13 ENCOUNTER — TELEPHONE (OUTPATIENT)
Dept: FAMILY MEDICINE CLINIC | Facility: CLINIC | Age: 31
End: 2021-07-13

## 2021-07-13 NOTE — TELEPHONE ENCOUNTER
Caller: Raven Eckert    Relationship to patient: Self    Best call back number: [phone]     Patient is needing: PATIENT HAD REQUESTED HER METFORMIN LAST Friday MORNING 07/09 AND THE PHARMACY STILL HAS NOT GOTTEN ANYTHING FROM THE OFFICE.  SHE IS COMPLETELY OUT.    PLEASE SEND REFILL AND CALL PATIENT WHEN IT IS SENT.    [id number] - [place], [place] - [address] AT [facility] OF [address] [facility] - [phone]  - [phone]

## 2021-07-16 ENCOUNTER — OFFICE VISIT (OUTPATIENT)
Dept: FAMILY MEDICINE CLINIC | Facility: CLINIC | Age: 31
End: 2021-07-16

## 2021-07-16 VITALS
WEIGHT: 293 LBS | RESPIRATION RATE: 18 BRPM | TEMPERATURE: 97.6 F | HEART RATE: 108 BPM | OXYGEN SATURATION: 98 % | BODY MASS INDEX: 47.09 KG/M2 | HEIGHT: 66 IN | DIASTOLIC BLOOD PRESSURE: 71 MMHG | SYSTOLIC BLOOD PRESSURE: 147 MMHG

## 2021-07-16 DIAGNOSIS — F41.0 GENERALIZED ANXIETY DISORDER WITH PANIC ATTACKS: ICD-10-CM

## 2021-07-16 DIAGNOSIS — R19.7 DIARRHEA, UNSPECIFIED TYPE: ICD-10-CM

## 2021-07-16 DIAGNOSIS — E66.01 CLASS 3 SEVERE OBESITY DUE TO EXCESS CALORIES WITH SERIOUS COMORBIDITY AND BODY MASS INDEX (BMI) OF 50.0 TO 59.9 IN ADULT (HCC): ICD-10-CM

## 2021-07-16 DIAGNOSIS — F41.1 GENERALIZED ANXIETY DISORDER WITH PANIC ATTACKS: ICD-10-CM

## 2021-07-16 DIAGNOSIS — R11.0 NAUSEA: ICD-10-CM

## 2021-07-16 DIAGNOSIS — Z51.81 MEDICATION MONITORING ENCOUNTER: Primary | ICD-10-CM

## 2021-07-16 PROBLEM — F41.9 ANXIETY: Status: ACTIVE | Noted: 2021-07-16

## 2021-07-16 PROBLEM — M19.90 ARTHRITIS: Status: ACTIVE | Noted: 2021-07-16

## 2021-07-16 PROBLEM — E16.2 HYPOGLYCEMIA: Status: ACTIVE | Noted: 2021-07-16

## 2021-07-16 PROBLEM — J30.2 SEASONAL ALLERGIC RHINITIS: Status: ACTIVE | Noted: 2021-07-16

## 2021-07-16 PROBLEM — F31.9 BIPOLAR MOOD DISORDER: Status: ACTIVE | Noted: 2021-07-16

## 2021-07-16 LAB
POC AMPHETAMINES: NEGATIVE
POC BARBITURATES: NEGATIVE
POC BENZODIAZEPHINES: NEGATIVE
POC COCAINE: NEGATIVE
POC METHADONE: NEGATIVE
POC METHAMPHETAMINE SCREEN URINE: NEGATIVE
POC OPIATES: NEGATIVE
POC OXYCODONE: POSITIVE
POC PHENCYCLIDINE: NEGATIVE
POC PROPOXYPHENE: NEGATIVE
POC THC: NEGATIVE
POC TRICYCLIC ANTIDEPRESSANTS: NEGATIVE

## 2021-07-16 PROCEDURE — 80305 DRUG TEST PRSMV DIR OPT OBS: CPT | Performed by: FAMILY MEDICINE

## 2021-07-16 PROCEDURE — 99214 OFFICE O/P EST MOD 30 MIN: CPT | Performed by: FAMILY MEDICINE

## 2021-07-16 RX ORDER — ESTRADIOL 2 MG/1
2 TABLET ORAL DAILY
COMMUNITY
Start: 2021-06-18 | End: 2021-09-13 | Stop reason: SDUPTHER

## 2021-07-16 RX ORDER — DULOXETIN HYDROCHLORIDE 30 MG/1
CAPSULE, DELAYED RELEASE ORAL
COMMUNITY
Start: 2021-07-16 | End: 2021-12-13

## 2021-07-16 RX ORDER — HYDROCODONE BITARTRATE AND ACETAMINOPHEN 7.5; 325 MG/1; MG/1
TABLET ORAL
COMMUNITY
Start: 2021-07-16 | End: 2021-12-13

## 2021-07-16 RX ORDER — CLONAZEPAM 0.5 MG/1
TABLET ORAL
COMMUNITY
End: 2021-07-16 | Stop reason: SDUPTHER

## 2021-07-16 RX ORDER — CELECOXIB 100 MG/1
CAPSULE ORAL
COMMUNITY
Start: 2021-07-16 | End: 2021-12-10

## 2021-07-16 RX ORDER — IBUPROFEN 800 MG/1
TABLET ORAL
COMMUNITY
Start: 2021-06-15 | End: 2021-12-10

## 2021-07-16 RX ORDER — PHENTERMINE HYDROCHLORIDE 37.5 MG/1
37.5 CAPSULE ORAL EVERY MORNING
Qty: 30 CAPSULE | Refills: 0 | Status: SHIPPED | OUTPATIENT
Start: 2021-07-16 | End: 2021-08-12 | Stop reason: SDUPTHER

## 2021-07-16 RX ORDER — CLONAZEPAM 0.5 MG/1
0.5 TABLET ORAL DAILY PRN
Qty: 15 TABLET | Refills: 1 | Status: SHIPPED | OUTPATIENT
Start: 2021-07-16 | End: 2021-12-13

## 2021-07-16 RX ORDER — LURASIDONE HYDROCHLORIDE 40 MG/1
80 TABLET, FILM COATED ORAL DAILY
COMMUNITY
Start: 2021-04-04 | End: 2022-03-10 | Stop reason: SDUPTHER

## 2021-07-16 NOTE — PROGRESS NOTES
"Chief Complaint  Labs Only (wants Hga1c checked ), Diarrhea, and Nausea    Subjective          Raven Eckert presents to [facility]  She is being managed for her chronic medical conditions. She has 2 children.  She is a smoker.  She has a past medical history significant for arthritis, tobacco abuse, diabetes, bipolar, hypoglycemia, muscle cramps and seasonal allergies.    She has been dieting and eating more healthy. She has been going to the gym.     She has been having numbness and burning down her left leg. She has a history of back surgery.     At today's visit she is having diarrhea. She says that yesterday was worse than today.      She has gained 17 lbs in the past 6 months.     The patient has no other complaints today and denies chest pain, shortness of breath, weakness, numbness, vomiting, dizziness or syncopal event.        Objective   Vital Signs:   /71   Pulse 108   Temp 97.6 °F (36.4 °C)   Resp 18   Ht 167.6 cm (65.98\")   Wt (!) 161 kg (354 lb 6.4 oz)   SpO2 98%   BMI 57.23 kg/m²     Physical Exam  Vitals reviewed.   Constitutional:       Appearance: Normal appearance. She is well-developed. She is obese.   HENT:      Head: Normocephalic and atraumatic.      Right Ear: External ear normal.      Left Ear: External ear normal.      Mouth/Throat:      Pharynx: No oropharyngeal exudate.   Eyes:      Conjunctiva/sclera: Conjunctivae normal.      Pupils: Pupils are equal, round, and reactive to light.   Neck:      Vascular: No carotid bruit.   Cardiovascular:      Rate and Rhythm: Normal rate and regular rhythm.      Heart sounds: No murmur heard.   No friction rub. No gallop.    Pulmonary:      Effort: Pulmonary effort is normal.      Breath sounds: Normal breath sounds. No wheezing or rhonchi.   Abdominal:      General: Bowel sounds are normal. There is no distension.      Palpations: Abdomen is soft.      Tenderness: There is no abdominal tenderness. "   Skin:     General: Skin is warm and dry.   Neurological:      Mental Status: She is alert and oriented to person, place, and time.      Cranial Nerves: No cranial nerve deficit.      Motor: No weakness.   Psychiatric:         Mood and Affect: Mood and affect normal.         Behavior: Behavior normal.         Thought Content: Thought content normal.         Judgment: Judgment normal.        Result Review :     CMP    CMP 12/8/20 12/9/20 3/25/21   Glucose 99 125 (A) 113 (A)   BUN 10 9 12   Creatinine 0.97 (A) 0.87 1.01 (A)   Sodium 136 132 (A) 139   Potassium 4.6 5.1 4.8   Chloride 107 104 101   Calcium 8.8 9.1 9.2   Albumin   4.2   Total Bilirubin   0.18 (A)   Alkaline Phosphatase   91   AST (SGOT)   39   ALT (SGPT)   72 (A)   (A) Abnormal value            CBC    CBC 12/8/20 12/8/20 12/9/20 3/25/21    0653 1637     WBC 7.10  18.11 (A) 11.76 (A)   RBC 4.35  4.17 (A) 4.72   Hemoglobin 13.6 14.3 13.1 14.7   Hematocrit 41.6 42.6 38.6 44.8   MCV 95.6  92.6 94.9   MCH 31.3 (A)  31.4 (A) 31.1 (A)   MCHC 32.7 (A)  33.9 32.8 (A)   RDW 12.8  12.6 12.7   Platelets 228  309 277   (A) Abnormal value            Lipid Panel    Lipid Panel 3/25/21 3/25/21    1404 1404   Total Cholesterol 239 (A)    Triglycerides 478 (A)    HDL Cholesterol 36 (A)    LDL Cholesterol   134 (A)   (A) Abnormal value       Comments are available for some flowsheets but are not being displayed.           HgB    HGB 12/8/20 12/8/20 12/9/20 3/25/21    0653 1637     Hemoglobin 13.6 14.3 13.1 14.7                     Assessment and Plan    Diagnoses and all orders for this visit:    1. Medication monitoring encounter (Primary)  Comments:  She was educated about the pluses and minuses of Phentermine. She was started back on 37.5 mg daily. She has a wt. loss goal of 5 lbs per month or 30 this year.  Orders:  -     POC Urine Drug Screen, Triage    2. Diarrhea, unspecified type  Comments:  Resolved. Most likely due to a virus.     3. Nausea  Comments:  Resolved.  Most likely due to a virus.     4. Class 3 severe obesity due to excess calories with serious comorbidity and body mass index (BMI) of 50.0 to 59.9 in adult (CMS/[facility])  Assessment & Plan:  She has gained 17 lbs since her last visit. She was educated about how to use a BMR calculator. She was told that it take 3,400 calories to make a lb and she would need to cut out this much for each lb she wants to lose. She was encouraged to exercise.       5. Generalized anxiety disorder with panic attacks  Assessment & Plan:  She is doing well on cymbalta 30 mg daily. She was given a refill on her ativan that she only uses for acute anxiety/panic.       Other orders  -     clonazePAM (KlonoPIN) 0.5 MG tablet; Take 1 tablet by mouth Daily As Needed for Anxiety.  Dispense: 15 tablet; Refill: 1  -     phentermine 37.5 MG capsule; Take 1 capsule by mouth Every Morning.  Dispense: 30 capsule; Refill: 0      Follow Up   Return if symptoms worsen or fail to improve.  Patient was given instructions and counseling regarding her condition or for health maintenance advice. Please see specific information pulled into the AVS if appropriate.

## 2021-07-18 PROBLEM — M51.36 DEGENERATION OF LUMBAR INTERVERTEBRAL DISC: Status: ACTIVE | Noted: 2021-07-18

## 2021-07-18 PROBLEM — M96.1 LUMBAR POST-LAMINECTOMY SYNDROME: Status: ACTIVE | Noted: 2021-07-18

## 2021-07-18 PROBLEM — E66.813 CLASS 3 SEVERE OBESITY DUE TO EXCESS CALORIES WITH SERIOUS COMORBIDITY AND BODY MASS INDEX (BMI) OF 50.0 TO 59.9 IN ADULT: Status: ACTIVE | Noted: 2021-07-18

## 2021-07-18 PROBLEM — M51.369 DEGENERATION OF LUMBAR INTERVERTEBRAL DISC: Status: ACTIVE | Noted: 2021-07-18

## 2021-07-18 PROBLEM — M79.7 FIBROMYALGIA: Status: ACTIVE | Noted: 2021-07-16

## 2021-07-18 PROBLEM — F41.1 GENERALIZED ANXIETY DISORDER WITH PANIC ATTACKS: Status: ACTIVE | Noted: 2021-07-18

## 2021-07-18 PROBLEM — F41.0 GENERALIZED ANXIETY DISORDER WITH PANIC ATTACKS: Status: ACTIVE | Noted: 2021-07-18

## 2021-07-18 PROBLEM — E66.01 CLASS 3 SEVERE OBESITY DUE TO EXCESS CALORIES WITH SERIOUS COMORBIDITY AND BODY MASS INDEX (BMI) OF 50.0 TO 59.9 IN ADULT (HCC): Status: ACTIVE | Noted: 2021-07-18

## 2021-07-18 PROBLEM — F41.9 ANXIETY: Status: RESOLVED | Noted: 2021-07-16 | Resolved: 2021-07-18

## 2021-07-18 NOTE — ASSESSMENT & PLAN NOTE
She has gained 17 lbs since her last visit. She was educated about how to use a BMR calculator. She was told that it take 3,400 calories to make a lb and she would need to cut out this much for each lb she wants to lose. She was encouraged to exercise.

## 2021-07-18 NOTE — ASSESSMENT & PLAN NOTE
She is doing well on cymbalta 30 mg daily. She was given a refill on her ativan that she only uses for acute anxiety/panic.

## 2021-08-12 ENCOUNTER — OFFICE VISIT (OUTPATIENT)
Dept: FAMILY MEDICINE CLINIC | Facility: CLINIC | Age: 31
End: 2021-08-12

## 2021-08-12 VITALS
SYSTOLIC BLOOD PRESSURE: 146 MMHG | RESPIRATION RATE: 18 BRPM | DIASTOLIC BLOOD PRESSURE: 91 MMHG | TEMPERATURE: 97.6 F | BODY MASS INDEX: 47.09 KG/M2 | WEIGHT: 293 LBS | HEIGHT: 66 IN | HEART RATE: 91 BPM | OXYGEN SATURATION: 98 %

## 2021-08-12 DIAGNOSIS — E66.01 CLASS 3 SEVERE OBESITY DUE TO EXCESS CALORIES WITH SERIOUS COMORBIDITY AND BODY MASS INDEX (BMI) OF 50.0 TO 59.9 IN ADULT (HCC): Chronic | ICD-10-CM

## 2021-08-12 DIAGNOSIS — F31.4 BIPOLAR DISORDER, CURRENT EPISODE DEPRESSED, SEVERE, WITHOUT PSYCHOTIC FEATURES (HCC): Chronic | ICD-10-CM

## 2021-08-12 DIAGNOSIS — Z71.3 ENCOUNTER FOR WEIGHT LOSS COUNSELING: Primary | ICD-10-CM

## 2021-08-12 PROBLEM — E66.813 CLASS 3 SEVERE OBESITY DUE TO EXCESS CALORIES WITH SERIOUS COMORBIDITY AND BODY MASS INDEX (BMI) OF 50.0 TO 59.9 IN ADULT: Chronic | Status: ACTIVE | Noted: 2021-07-18

## 2021-08-12 PROBLEM — F31.9 BIPOLAR MOOD DISORDER: Chronic | Status: ACTIVE | Noted: 2021-07-16

## 2021-08-12 PROCEDURE — 99214 OFFICE O/P EST MOD 30 MIN: CPT | Performed by: FAMILY MEDICINE

## 2021-08-12 RX ORDER — PHENTERMINE HYDROCHLORIDE 37.5 MG/1
37.5 CAPSULE ORAL EVERY MORNING
Qty: 30 CAPSULE | Refills: 0 | Status: SHIPPED | OUTPATIENT
Start: 2021-08-12 | End: 2021-09-13 | Stop reason: SDUPTHER

## 2021-08-12 NOTE — PROGRESS NOTES
"Chief Complaint  Depression    Subjective          Raven Eckert presents to [facility]  She is being managed for her chronic medical conditions. She has 2 children.  She is a smoker.  She has a past medical history significant for arthritis, tobacco abuse, diabetes, bipolar, hypoglycemia, muscle cramps and seasonal allergies.     She has been dieting and eating more healthy. She has been going to the gym.  She is lost 7 pounds since her last visit.  She is tolerating the phentermine 37.5 mg without side effect.    The patient is struggling today with depression.  She does admit that she has a history of PTSD.  Recently the patient's been struggling with more depressed mood.  She says that her  had to help her take a bath last night because she had had a bath in 3 days.  She is tearful during the encounter.  She denies thoughts of suicide or homicide.    The patient has no other complaints today and denies chest pain, shortness of breath, weakness, numbness, nausea, vomiting, diarrhea, dizziness or syncopal event.        Objective   Vital Signs:   /91 (BP Location: Left arm, Patient Position: Sitting)   Pulse 91   Temp 97.6 °F (36.4 °C)   Resp 18   Ht 167.6 cm (66\")   Wt (!) 158 kg (347 lb 12.8 oz)   SpO2 98%   BMI 56.14 kg/m²     Physical Exam  Vitals reviewed.   Constitutional:       Appearance: Normal appearance. She is well-developed. She is obese.   HENT:      Head: Normocephalic and atraumatic.      Right Ear: External ear normal.      Left Ear: External ear normal.      Mouth/Throat:      Pharynx: No oropharyngeal exudate.   Eyes:      Conjunctiva/sclera: Conjunctivae normal.      Pupils: Pupils are equal, round, and reactive to light.   Neck:      Vascular: No carotid bruit.   Cardiovascular:      Rate and Rhythm: Normal rate and regular rhythm.      Heart sounds: No murmur heard.   No friction rub. No gallop.    Pulmonary:      Effort: Pulmonary " effort is normal.      Breath sounds: Normal breath sounds. No wheezing or rhonchi.   Abdominal:      General: Bowel sounds are normal. There is no distension.      Palpations: Abdomen is soft.      Tenderness: There is no abdominal tenderness.   Skin:     General: Skin is warm and dry.   Neurological:      Mental Status: She is alert and oriented to person, place, and time.      Cranial Nerves: No cranial nerve deficit.      Motor: No weakness.   Psychiatric:         Mood and Affect: Mood and affect normal.         Behavior: Behavior normal.         Thought Content: Thought content normal.         Judgment: Judgment normal.        Result Review :     CMP    CMP 12/8/20 12/9/20 3/25/21   Glucose 99 125 (A) 113 (A)   BUN 10 9 12   Creatinine 0.97 (A) 0.87 1.01 (A)   Sodium 136 132 (A) 139   Potassium 4.6 5.1 4.8   Chloride 107 104 101   Calcium 8.8 9.1 9.2   Albumin   4.2   Total Bilirubin   0.18 (A)   Alkaline Phosphatase   91   AST (SGOT)   39   ALT (SGPT)   72 (A)   (A) Abnormal value            CBC    CBC 12/8/20 12/8/20 12/9/20 3/25/21    0653 1637     WBC 7.10  18.11 (A) 11.76 (A)   RBC 4.35  4.17 (A) 4.72   Hemoglobin 13.6 14.3 13.1 14.7   Hematocrit 41.6 42.6 38.6 44.8   MCV 95.6  92.6 94.9   MCH 31.3 (A)  31.4 (A) 31.1 (A)   MCHC 32.7 (A)  33.9 32.8 (A)   RDW 12.8  12.6 12.7   Platelets 228  309 277   (A) Abnormal value            Lipid Panel    Lipid Panel 3/25/21 3/25/21    1404 1404   Total Cholesterol 239 (A)    Triglycerides 478 (A)    HDL Cholesterol 36 (A)    LDL Cholesterol   134 (A)   (A) Abnormal value       Comments are available for some flowsheets but are not being displayed.           TSH    TSH 3/25/21   TSH 1.480                     Assessment and Plan    Diagnoses and all orders for this visit:    1. Encounter for weight loss counseling (Primary)  Assessment & Plan:  The patient appears to be doing good today on 37-1/2 mg of phentermine daily.  Her weight is down 7 pounds today.  Her blood  pressure and heart rate are not being significantly affected by the medication.  She was encouraged to lose 4 pounds in the next month.  She was given 1 month follow-up.      2. Class 3 severe obesity due to excess calories with serious comorbidity and body mass index (BMI) of 50.0 to 59.9 in adult (CMS/[facility])  Assessment & Plan:  Diet, exercise and weight loss were highly encouraged at today's visit.  She will be continued on phentermine 37-1/2 mg daily.  She was encouraged lose at least 4 pounds during our next visit.      3. Bipolar disorder, current episode depressed, severe, without psychotic features (CMS/[facility])  Assessment & Plan:  Approximately 20 minutes was spent just with cognitive behavioral therapy techniques with the patient today.  Her mood seemed to improve and her's.  Lightened after our discussion.  I challenged the patient to meditate on the good things in her life.  I commended her for her weight loss and told her that if she needed a few days off of work I will be glad to fill out FMLA papers.      Other orders  -     phentermine 37.5 MG capsule; Take 1 capsule by mouth Every Morning.  Dispense: 30 capsule; Refill: 0      Follow Up   Return in about 1 month (around 9/12/2021).  Patient was given instructions and counseling regarding her condition or for health maintenance advice. Please see specific information pulled into the AVS if appropriate.

## 2021-08-12 NOTE — ASSESSMENT & PLAN NOTE
The patient appears to be doing good today on 37-1/2 mg of phentermine daily.  Her weight is down 7 pounds today.  Her blood pressure and heart rate are not being significantly affected by the medication.  She was encouraged to lose 4 pounds in the next month.  She was given 1 month follow-up.

## 2021-08-12 NOTE — ASSESSMENT & PLAN NOTE
Approximately 20 minutes was spent just with cognitive behavioral therapy techniques with the patient today.  Her mood seemed to improve and her's.  Lightened after our discussion.  I challenged the patient to meditate on the good things in her life.  I commended her for her weight loss and told her that if she needed a few days off of work I will be glad to fill out FMLA papers.

## 2021-08-12 NOTE — ASSESSMENT & PLAN NOTE
Diet, exercise and weight loss were highly encouraged at today's visit.  She will be continued on phentermine 37-1/2 mg daily.  She was encouraged lose at least 4 pounds during our next visit.

## 2021-08-30 ENCOUNTER — TELEPHONE (OUTPATIENT)
Dept: FAMILY MEDICINE CLINIC | Facility: CLINIC | Age: 31
End: 2021-08-30

## 2021-08-30 NOTE — TELEPHONE ENCOUNTER
Patient came into office and stated that she is still having a hard time with depression and is wanting to see if you can give her a work note for this week until she's able to get her FMLA papers in to you to fill out.

## 2021-08-30 NOTE — TELEPHONE ENCOUNTER
Caller: Raven Eckert    Relationship to patient: Self    Best call back number: [phone]    Patient is needing: PATIENT WOULD LIKE TO SPEAK TO DR STEVENS IN REGARDS TO HER [facility] PAPERWORK. PLEASE CALL TO ADVISE.

## 2021-09-13 RX ORDER — PHENTERMINE HYDROCHLORIDE 37.5 MG/1
37.5 CAPSULE ORAL EVERY MORNING
Qty: 30 CAPSULE | Refills: 0 | Status: SHIPPED | OUTPATIENT
Start: 2021-09-13 | End: 2021-12-13

## 2021-09-13 RX ORDER — ESTRADIOL 2 MG/1
2 TABLET ORAL DAILY
Qty: 30 TABLET | Refills: 1 | Status: SHIPPED | OUTPATIENT
Start: 2021-09-13 | End: 2021-12-13 | Stop reason: SDUPTHER

## 2021-09-13 NOTE — TELEPHONE ENCOUNTER
Caller: Babar Ravenruy VelizMelissa    Relationship: Self    Best call back number: [phone]    Medication needed:   Requested Prescriptions     Pending Prescriptions Disp Refills   • metFORMIN (GLUCOPHAGE) 850 MG tablet 60 tablet 5     Sig: Take 1 tablet by mouth 2 (Two) Times a Day With Meals.   • phentermine 37.5 MG capsule 30 capsule 0     Sig: Take 1 capsule by mouth Every Morning.   • estradiol (ESTRACE) 2 MG tablet       Sig: Take 1 tablet by mouth Daily.       When do you need the refill by: ASAP     What additional details did the patient provide when requesting the medication:    HAS A ETHAN ON 09/15/2021   Does the patient have less than a 3 day supply:  [x] Yes  [] No    What is the patient's preferred pharmacy: [facility] [address] LUIGI APARIC[facility] [phone] [facility] [phone] FX

## 2021-09-30 ENCOUNTER — OFFICE VISIT (OUTPATIENT)
Dept: FAMILY MEDICINE CLINIC | Facility: CLINIC | Age: 31
End: 2021-09-30

## 2021-09-30 VITALS
DIASTOLIC BLOOD PRESSURE: 74 MMHG | RESPIRATION RATE: 20 BRPM | WEIGHT: 293 LBS | TEMPERATURE: 97.6 F | OXYGEN SATURATION: 95 % | BODY MASS INDEX: 47.09 KG/M2 | HEART RATE: 93 BPM | SYSTOLIC BLOOD PRESSURE: 146 MMHG | HEIGHT: 66 IN

## 2021-09-30 DIAGNOSIS — E66.01 CLASS 3 SEVERE OBESITY DUE TO EXCESS CALORIES WITH SERIOUS COMORBIDITY AND BODY MASS INDEX (BMI) OF 50.0 TO 59.9 IN ADULT (HCC): Primary | Chronic | ICD-10-CM

## 2021-09-30 DIAGNOSIS — F31.4 BIPOLAR DISORDER, CURRENT EPISODE DEPRESSED, SEVERE, WITHOUT PSYCHOTIC FEATURES (HCC): Chronic | ICD-10-CM

## 2021-09-30 DIAGNOSIS — Z71.3 ENCOUNTER FOR WEIGHT LOSS COUNSELING: Chronic | ICD-10-CM

## 2021-09-30 PROCEDURE — 99213 OFFICE O/P EST LOW 20 MIN: CPT | Performed by: FAMILY MEDICINE

## 2021-10-04 NOTE — ASSESSMENT & PLAN NOTE
Patient's (Body mass index is 57.52 kg/m².) indicates that they are morbidly obese (BMI > 40 or > 35 with obesity - related health condition) with health conditions that include none . Weight is worsening. BMI is is above average; BMI management plan is completed. We discussed low calorie, low carb based diet program, portion control and increasing exercise.

## 2021-10-04 NOTE — ASSESSMENT & PLAN NOTE
The patient was highly encouraged to take advantage of the phentermine while it is being prescribed.  She was encouraged to exercise self-control, increase her diet focus and exercise daily.

## 2021-12-10 ENCOUNTER — HOSPITAL ENCOUNTER (EMERGENCY)
Facility: HOSPITAL | Age: 31
Discharge: HOME | End: 2021-12-10
Attending: EMERGENCY MEDICINE | Admitting: EMERGENCY MEDICINE

## 2021-12-10 VITALS
BODY MASS INDEX: 47.09 KG/M2 | HEIGHT: 66 IN | TEMPERATURE: 98.1 F | RESPIRATION RATE: 20 BRPM | SYSTOLIC BLOOD PRESSURE: 127 MMHG | DIASTOLIC BLOOD PRESSURE: 78 MMHG | OXYGEN SATURATION: 99 % | WEIGHT: 293 LBS | HEART RATE: 87 BPM

## 2021-12-10 DIAGNOSIS — M54.50 ACUTE MIDLINE LOW BACK PAIN WITHOUT SCIATICA: Primary | ICD-10-CM

## 2021-12-10 LAB
ALBUMIN SERPL-MCNC: 4.2 G/DL (ref 3.5–5.2)
ALBUMIN/GLOB SERPL: 1.2 G/DL
ALP SERPL-CCNC: 88 U/L (ref 39–117)
ALT SERPL W P-5'-P-CCNC: 44 U/L (ref 1–33)
ANION GAP SERPL CALCULATED.3IONS-SCNC: 12.6 MMOL/L (ref 5–15)
AST SERPL-CCNC: 27 U/L (ref 1–32)
BASOPHILS # BLD AUTO: 0.04 10*3/MM3 (ref 0–0.2)
BASOPHILS NFR BLD AUTO: 0.4 % (ref 0–1.5)
BILIRUB SERPL-MCNC: <0.2 MG/DL (ref 0–1.2)
BILIRUB UR QL STRIP: NEGATIVE
BUN SERPL-MCNC: 9 MG/DL (ref 6–20)
BUN/CREAT SERPL: 8.9 (ref 7–25)
CALCIUM SPEC-SCNC: 9.8 MG/DL (ref 8.6–10.5)
CHLORIDE SERPL-SCNC: 106 MMOL/L (ref 98–107)
CLARITY UR: CLEAR
CO2 SERPL-SCNC: 21.4 MMOL/L (ref 22–29)
COLOR UR: YELLOW
CREAT SERPL-MCNC: 1.01 MG/DL (ref 0.57–1)
DEPRECATED RDW RBC AUTO: 41.6 FL (ref 37–54)
EOSINOPHIL # BLD AUTO: 0.18 10*3/MM3 (ref 0–0.4)
EOSINOPHIL NFR BLD AUTO: 1.9 % (ref 0.3–6.2)
ERYTHROCYTE [DISTWIDTH] IN BLOOD BY AUTOMATED COUNT: 12.3 % (ref 12.3–15.4)
GFR SERPL CREATININE-BSD FRML MDRD: 64 ML/MIN/1.73
GLOBULIN UR ELPH-MCNC: 3.4 GM/DL
GLUCOSE SERPL-MCNC: 80 MG/DL (ref 65–99)
GLUCOSE UR STRIP-MCNC: NEGATIVE MG/DL
HCT VFR BLD AUTO: 39.3 % (ref 34–46.6)
HGB BLD-MCNC: 13.5 G/DL (ref 12–15.9)
HGB UR QL STRIP.AUTO: NEGATIVE
HOLD SPECIMEN: NORMAL
HOLD SPECIMEN: NORMAL
IMM GRANULOCYTES # BLD AUTO: 0.02 10*3/MM3 (ref 0–0.05)
IMM GRANULOCYTES NFR BLD AUTO: 0.2 % (ref 0–0.5)
KETONES UR QL STRIP: NEGATIVE
LEUKOCYTE ESTERASE UR QL STRIP.AUTO: NEGATIVE
LIPASE SERPL-CCNC: 59 U/L (ref 13–60)
LYMPHOCYTES # BLD AUTO: 3.93 10*3/MM3 (ref 0.7–3.1)
LYMPHOCYTES NFR BLD AUTO: 41 % (ref 19.6–45.3)
MCH RBC QN AUTO: 31.6 PG (ref 26.6–33)
MCHC RBC AUTO-ENTMCNC: 34.4 G/DL (ref 31.5–35.7)
MCV RBC AUTO: 92 FL (ref 79–97)
MONOCYTES # BLD AUTO: 0.87 10*3/MM3 (ref 0.1–0.9)
MONOCYTES NFR BLD AUTO: 9.1 % (ref 5–12)
NEUTROPHILS NFR BLD AUTO: 4.55 10*3/MM3 (ref 1.7–7)
NEUTROPHILS NFR BLD AUTO: 47.4 % (ref 42.7–76)
NITRITE UR QL STRIP: NEGATIVE
NRBC BLD AUTO-RTO: 0 /100 WBC (ref 0–0.2)
PH UR STRIP.AUTO: 5.5 [PH] (ref 5–8)
PLATELET # BLD AUTO: 256 10*3/MM3 (ref 140–450)
PMV BLD AUTO: 10.5 FL (ref 6–12)
POTASSIUM SERPL-SCNC: 4.4 MMOL/L (ref 3.5–5.2)
PROT SERPL-MCNC: 7.6 G/DL (ref 6–8.5)
PROT UR QL STRIP: NEGATIVE
RBC # BLD AUTO: 4.27 10*6/MM3 (ref 3.77–5.28)
SODIUM SERPL-SCNC: 140 MMOL/L (ref 136–145)
SP GR UR STRIP: 1.01 (ref 1–1.03)
UROBILINOGEN UR QL STRIP: NORMAL
WBC NRBC COR # BLD: 9.59 10*3/MM3 (ref 3.4–10.8)
WHOLE BLOOD HOLD SPECIMEN: NORMAL
WHOLE BLOOD HOLD SPECIMEN: NORMAL

## 2021-12-10 PROCEDURE — 80053 COMPREHEN METABOLIC PANEL: CPT

## 2021-12-10 PROCEDURE — 83690 ASSAY OF LIPASE: CPT

## 2021-12-10 PROCEDURE — 85025 COMPLETE CBC W/AUTO DIFF WBC: CPT

## 2021-12-10 PROCEDURE — 96374 THER/PROPH/DIAG INJ IV PUSH: CPT

## 2021-12-10 PROCEDURE — 81003 URINALYSIS AUTO W/O SCOPE: CPT

## 2021-12-10 PROCEDURE — 25010000002 HYDROMORPHONE 1 MG/ML SOLUTION: Performed by: EMERGENCY MEDICINE

## 2021-12-10 PROCEDURE — 96375 TX/PRO/DX INJ NEW DRUG ADDON: CPT

## 2021-12-10 PROCEDURE — 99283 EMERGENCY DEPT VISIT LOW MDM: CPT

## 2021-12-10 PROCEDURE — 36415 COLL VENOUS BLD VENIPUNCTURE: CPT

## 2021-12-10 PROCEDURE — 25010000002 METHYLPREDNISOLONE PER 125 MG: Performed by: EMERGENCY MEDICINE

## 2021-12-10 RX ORDER — OXYCODONE HYDROCHLORIDE AND ACETAMINOPHEN 5; 325 MG/1; MG/1
1 TABLET ORAL EVERY 6 HOURS PRN
COMMUNITY
End: 2021-12-13

## 2021-12-10 RX ORDER — DIFLUNISAL 500 MG/1
500 TABLET, FILM COATED ORAL 2 TIMES DAILY
Qty: 30 TABLET | Refills: 0 | Status: SHIPPED | OUTPATIENT
Start: 2021-12-10 | End: 2021-12-13

## 2021-12-10 RX ORDER — HYDROCODONE BITARTRATE AND ACETAMINOPHEN 5; 325 MG/1; MG/1
1 TABLET ORAL EVERY 6 HOURS PRN
COMMUNITY
End: 2021-12-13

## 2021-12-10 RX ORDER — METHOCARBAMOL 750 MG/1
750 TABLET, FILM COATED ORAL 3 TIMES DAILY PRN
Qty: 12 TABLET | Refills: 0 | Status: SHIPPED | OUTPATIENT
Start: 2021-12-10 | End: 2021-12-17

## 2021-12-10 RX ORDER — METHYLPREDNISOLONE SODIUM SUCCINATE 125 MG/2ML
125 INJECTION, POWDER, LYOPHILIZED, FOR SOLUTION INTRAMUSCULAR; INTRAVENOUS ONCE
Status: COMPLETED | OUTPATIENT
Start: 2021-12-10 | End: 2021-12-10

## 2021-12-10 RX ORDER — PREDNISONE 20 MG/1
40 TABLET ORAL DAILY
Qty: 10 TABLET | Refills: 0 | Status: SHIPPED | OUTPATIENT
Start: 2021-12-10 | End: 2022-01-14

## 2021-12-10 RX ORDER — SODIUM CHLORIDE 0.9 % (FLUSH) 0.9 %
10 SYRINGE (ML) INJECTION AS NEEDED
Status: DISCONTINUED | OUTPATIENT
Start: 2021-12-10 | End: 2021-12-10 | Stop reason: HOSPADM

## 2021-12-10 RX ADMIN — HYDROMORPHONE HYDROCHLORIDE 1 MG: 1 INJECTION, SOLUTION INTRAMUSCULAR; INTRAVENOUS; SUBCUTANEOUS at 13:31

## 2021-12-10 RX ADMIN — METHYLPREDNISOLONE SODIUM SUCCINATE 125 MG: 125 INJECTION, POWDER, FOR SOLUTION INTRAMUSCULAR; INTRAVENOUS at 13:31

## 2021-12-10 NOTE — ED PROVIDER NOTES
Subjective   Patient states she has been having mid back/right flank pain worse with movement for several days.  Patient states she has a long history of chronic back issues to include back surgery.  Patient also states that she has had some pain that is radiating from her right flank to her lower abdomen.  Patient states pain is worse with movement and also noticed that worsens when laying in bed.  Patient states she originally thought that she might of had a urinary tract infection or kidney infection was seen at the emergency department in [place] yesterday.  Patient she had a CAT scan and blood work done and was also treated for possible STDs.  Patient states that she came here for second opinion.  Patient states she believes that her pain is not related to a possible STD.  Patient denies any concerns for STD.  Patient states that she would like to be evaluated for back pain.      History provided by:  Patient   used: No    Urinary Tract Infection  Quality: Patient denies any dysuria.  Relieved by:  Nothing  Ineffective treatments:  None tried  Urinary symptoms: no discolored urine, no foul-smelling urine, no frequent urination, no hematuria, no hesitancy and no bladder incontinence    Associated symptoms: abdominal pain and flank pain    Associated symptoms: no fever, no nausea, no vaginal discharge and no vomiting    Associated symptoms comment:  Patient complaining of right mid back/flank pain that at times radiates to abdomen.  Patient dates pain is worse with movement.  Risk factors: no hx of pyelonephritis, no hx of urolithiasis, no kidney transplant, not pregnant, no recurrent urinary tract infections, no renal cysts, no renal disease, no sexually transmitted infections, no single kidney and no urinary catheter    Abdominal Pain  Pain location: Patient states she has having right\flank pain that radiates to her abdomen that is been intermittent and worse with movement.  Pain  severity:  Moderate  Onset quality:  Sudden  Duration: Last several days.  Timing:  Constant  Progression:  Waxing and waning  Context: not alcohol use, not awakening from sleep, not diet changes, not eating, not laxative use, not medication withdrawal, not previous surgeries, not recent illness, not recent sexual activity, not recent travel, not retching, not sick contacts, not suspicious food intake and not trauma    Relieved by:  Nothing  Worsened by:  Movement  Ineffective treatments:  None tried  Associated symptoms: no anorexia, no belching, no chest pain, no chills, no constipation, no cough, no diarrhea, no dysuria, no fatigue, no fever, no flatus, no hematemesis, no hematochezia, no hematuria, no melena, no nausea, no shortness of breath, no sore throat, no vaginal bleeding, no vaginal discharge and no vomiting    Risk factors: obesity    Risk factors: not pregnant and no recent hospitalization        Review of Systems   Constitutional: Negative for chills, fatigue and fever.   HENT: Negative for sore throat.    Respiratory: Negative for cough and shortness of breath.    Cardiovascular: Negative for chest pain.   Gastrointestinal: Positive for abdominal pain. Negative for anorexia, constipation, diarrhea, flatus, hematemesis, hematochezia, melena, nausea and vomiting.   Genitourinary: Positive for flank pain. Negative for dysuria, hematuria, vaginal bleeding and vaginal discharge.   All other systems reviewed and are negative.      Past Medical History:   Diagnosis Date   • Anxiety    • Arthritis    • Bipolar mood disorder (HCC)    • Broken bones 2012   • Cataract    • Class 3 severe obesity due to excess calories with serious comorbidity and body mass index (BMI) of 50.0 to 59.9 in adult ([facility]) 7/18/2021   • DDD (degenerative disc disease), lumbar    • Depression    • Foot pain    • Generalized anxiety disorder with panic attacks 7/18/2021   • Heel pain    • Hypoglycemia    • Leg pain    • Limb swelling     • Muscle cramps    • Numbness in feet    • Prediabetes    • Psychiatric disorder    • Seasonal allergies        Allergies   Allergen Reactions   • Lamotrigine GI Intolerance   • Sulfa Antibiotics Hives       Past Surgical History:   Procedure Laterality Date   •  SECTION      x 2   • FOOT SURGERY     • HYSTERECTOMY     • LASER ABLATION     • LUMBAR DISCECTOMY Left 2020    MINIMALLY INVASIVE L5-S1   • OTHER SURGICAL HISTORY      METAL IMPLANTS       Family History   Problem Relation Age of Onset   • Diabetes Mother         Unspecified   • Arthritis Mother    • Arthritis Father    • Cancer Father         Unspecified       Social History     Socioeconomic History   • Marital status: Legally    Tobacco Use   • Smoking status: Current Every Day Smoker     Packs/day: 0.50   • Smokeless tobacco: Never Used   Vaping Use   • Vaping Use: Every day   • Substances: Nicotine, Flavoring   Substance and Sexual Activity   • Alcohol use: Never   • Drug use: Never           Objective   Physical Exam  Vitals and nursing note reviewed.   Constitutional:       General: She is not in acute distress.     Appearance: Normal appearance. She is well-developed. She is obese. She is not ill-appearing, toxic-appearing or diaphoretic.   HENT:      Head: Normocephalic and atraumatic.      Mouth/Throat:      Mouth: Mucous membranes are moist.   Eyes:      General: No scleral icterus.  Cardiovascular:      Rate and Rhythm: Normal rate and regular rhythm.      Pulses: Normal pulses.      Heart sounds: Normal heart sounds.   Pulmonary:      Effort: Pulmonary effort is normal. No respiratory distress.      Breath sounds: Normal breath sounds.   Abdominal:      General: Abdomen is flat.      Palpations: Abdomen is soft.      Tenderness: There is no abdominal tenderness. There is no right CVA tenderness, left CVA tenderness, guarding or rebound. Negative signs include Kurtz's sign, Rovsing's sign, McBurney's sign, psoas sign  "and obturator sign.      Hernia: No hernia is present.   Musculoskeletal:         General: Normal range of motion.      Cervical back: Normal range of motion and neck supple.   Skin:     General: Skin is warm and dry.   Neurological:      General: No focal deficit present.      Mental Status: She is alert and oriented to person, place, and time. Mental status is at baseline.         Procedures           ED Course         Patient visually upset stating that she would like some hydrocodone's to go home with that she is not happy with the discharge medications.  Patient said that she did take some Percocets at home today.  However they were old Percocets.  Was able to pull Yo, patient did not have any symptoms However It Did Not Appear She Received Narcotics over the Last Couple of Months.  Patient States That She Was Formally in Pain Management and Had to Wean off Her Medications Because She Was \"a Zombie\".  Discussed Treatment Options with Patient.  Patient Is Upset Due To the Lack of Narcotic Pain Medications.  Patient Does Have a Appointment Scheduled with Her PCP This Monday.  Patient says that when she was discharged from [place] emergency department only gave her was Toradol not not enough to deal with her level of back pain.  Discussed treatment options with attending physician.  Opted not to give narcotic pain medicines at this time.  Patient although looks distressed for not receiving pain medicines she has no complaints of current back pain or difficulty ambulating throughout the emergency department.                                        MDM  Number of Diagnoses or Management Options  Acute midline low back pain without sciatica  Diagnosis management comments: I have spoken with Ms. Eckert. I have explained the patient´s condition, diagnoses and treatment plan based on the information available to me at this time. I have answered the patient's questions and addressed any concerns. The patient has a " good  understanding of the patient´s diagnosis, condition, and treatment plan as can be expected at this point. The vital signs have been stable. The patient´s condition is stable and appropriate for discharge from the emergency department.      The patient will pursue further outpatient evaluation with the primary care physician or other designated or consulting physician as outlined in the discharge instructions. They are agreeable to this plan of care and follow-up instructions have been explained in detail. The patient has received these instructions in written format and have expressed an understanding of the discharge instructions. The patient is aware that any significant change in condition or worsening of symptoms should prompt an immediate return to this or the closest emergency department or call to 911.       Amount and/or Complexity of Data Reviewed  Clinical lab tests: reviewed  Review and summarize past medical records: yes (Patient seen at [facility] emergency department in [place] yesterday.  Patient had a CAT scan of her abdomen pelvis that showed no acute findings, patient had pelvic exam with cultures that showed no acute findings, patient's CBC CMP and urinalysis were all unremarkable.)    Risk of Complications, Morbidity, and/or Mortality  Presenting problems: moderate  Diagnostic procedures: low  Management options: low    Patient Progress  Patient progress: stable      Final diagnoses:   Acute midline low back pain without sciatica       ED Disposition  ED Disposition     ED Disposition Condition Comment    Discharge Stable           Fransisca Carver,   [address]  [address] [address] [phone]    In 3 days           Medication List      New Prescriptions    diflunisal 500 MG tablet tablet  Commonly known as: DOLOBID  Take 1 tablet by mouth 2 (Two) Times a Day.     methocarbamol 750 MG tablet  Commonly known as: ROBAXIN  Take 1 tablet by mouth 3 (Three) Times a Day As Needed  for Muscle Spasms.     predniSONE 20 MG tablet  Commonly known as: DELTASONE  Take 2 tablets by mouth Daily.        Changed    clonazePAM 0.5 MG tablet  Commonly known as: KlonoPIN  Take 1 tablet by mouth Daily As Needed for Anxiety.  What changed: when to take this           Where to Get Your Medications      These medications were sent to [facility] - [place], [place] - [address] - [phone]  - [phone] FX  [address] [address]    Phone: [phone]   · diflunisal 500 MG tablet tablet  · methocarbamol 750 MG tablet  · predniSONE 20 MG tablet          Dontae Velasquez APRN  12/10/21 1537       Dontae Velasquez APRN  12/10/21 1626

## 2021-12-10 NOTE — ED PROVIDER NOTES
"Patient is 30 y.o. year old female that presents to the ED for evaluation of back pain.     Physical Exam    ED Course:    /85   Pulse 86   Temp 98.1 °F (36.7 °C) (Oral)   Resp 20   Ht 167.6 cm (66\")   Wt (!) 146 kg (321 lb 3.4 oz)   SpO2 99%   BMI 51.84 kg/m²   Results for orders placed or performed during the hospital encounter of 12/10/21   Comprehensive Metabolic Panel    Specimen: Blood   Result Value Ref Range    Glucose 80 65 - 99 mg/dL    BUN 9 6 - 20 mg/dL    Creatinine 1.01 (H) 0.57 - 1.00 mg/dL    Sodium 140 136 - 145 mmol/L    Potassium 4.4 3.5 - 5.2 mmol/L    Chloride 106 98 - 107 mmol/L    CO2 21.4 (L) 22.0 - 29.0 mmol/L    Calcium 9.8 8.6 - 10.5 mg/dL    Total Protein 7.6 6.0 - 8.5 g/dL    Albumin 4.20 3.50 - 5.20 g/dL    ALT (SGPT) 44 (H) 1 - 33 U/L    AST (SGOT) 27 1 - 32 U/L    Alkaline Phosphatase 88 39 - 117 U/L    Total Bilirubin <0.2 0.0 - 1.2 mg/dL    eGFR Non African Amer 64 >60 mL/min/1.73    Globulin 3.4 gm/dL    A/G Ratio 1.2 g/dL    BUN/Creatinine Ratio 8.9 7.0 - 25.0    Anion Gap 12.6 5.0 - 15.0 mmol/L   Lipase    Specimen: Blood   Result Value Ref Range    Lipase 59 13 - 60 U/L   Urinalysis With Microscopic If Indicated (No Culture) - Urine, Clean Catch    Specimen: Urine, Clean Catch   Result Value Ref Range    Color, UA Yellow Yellow, Straw    Appearance, UA Clear Clear    pH, UA 5.5 5.0 - 8.0    Specific Gravity, UA 1.013 1.005 - 1.030    Glucose, UA Negative Negative    Ketones, UA Negative Negative    Bilirubin, UA Negative Negative    Blood, UA Negative Negative    Protein, UA Negative Negative    Leuk Esterase, UA Negative Negative    Nitrite, UA Negative Negative    Urobilinogen, UA 0.2 E.U./dL 0.2 - 1.0 E.U./dL   CBC Auto Differential    Specimen: Blood   Result Value Ref Range    WBC 9.59 3.40 - 10.80 10*3/mm3    RBC 4.27 3.77 - 5.28 10*6/mm3    Hemoglobin 13.5 12.0 - 15.9 g/dL    Hematocrit 39.3 34.0 - 46.6 %    MCV 92.0 79.0 - 97.0 fL    MCH 31.6 26.6 - 33.0 pg "    MCHC 34.4 31.5 - 35.7 g/dL    RDW 12.3 12.3 - 15.4 %    RDW-SD 41.6 37.0 - 54.0 fl    MPV 10.5 6.0 - 12.0 fL    Platelets 256 140 - 450 10*3/mm3    Neutrophil % 47.4 42.7 - 76.0 %    Lymphocyte % 41.0 19.6 - 45.3 %    Monocyte % 9.1 5.0 - 12.0 %    Eosinophil % 1.9 0.3 - 6.2 %    Basophil % 0.4 0.0 - 1.5 %    Immature Grans % 0.2 0.0 - 0.5 %    Neutrophils, Absolute 4.55 1.70 - 7.00 10*3/mm3    Lymphocytes, Absolute 3.93 (H) 0.70 - 3.10 10*3/mm3    Monocytes, Absolute 0.87 0.10 - 0.90 10*3/mm3    Eosinophils, Absolute 0.18 0.00 - 0.40 10*3/mm3    Basophils, Absolute 0.04 0.00 - 0.20 10*3/mm3    Immature Grans, Absolute 0.02 0.00 - 0.05 10*3/mm3    nRBC 0.0 0.0 - 0.2 /100 WBC   Green Top (Gel)   Result Value Ref Range    Extra Tube Hold for add-ons.    Lavender Top   Result Value Ref Range    Extra Tube hold for add-on    Gold Top - SST   Result Value Ref Range    Extra Tube Hold for add-ons.    Light Blue Top   Result Value Ref Range    Extra Tube hold for add-on      Medications   sodium chloride 0.9 % flush 10 mL (has no administration in time range)   sodium chloride 0.9 % flush 10 mL (has no administration in time range)   HYDROmorphone (DILAUDID) injection 1 mg (has no administration in time range)   methylPREDNISolone sodium succinate (SOLU-Medrol) injection 125 mg (has no administration in time range)     No results found.    MDM:      The case was discussed between the ETHAN and myself. Patient  care including, but not limited to ordered imaging, medications, and lab results were reviewed. I then performed the substantive portion of the visit including all aspects of the medical decision making.        Armond Samson MD  13:12 EST  12/10/21       Armond Samson MD  12/10/21 4254

## 2021-12-13 ENCOUNTER — OFFICE VISIT (OUTPATIENT)
Dept: FAMILY MEDICINE CLINIC | Facility: CLINIC | Age: 31
End: 2021-12-13

## 2021-12-13 VITALS
SYSTOLIC BLOOD PRESSURE: 136 MMHG | TEMPERATURE: 97.5 F | WEIGHT: 293 LBS | BODY MASS INDEX: 47.09 KG/M2 | HEART RATE: 112 BPM | HEIGHT: 66 IN | DIASTOLIC BLOOD PRESSURE: 87 MMHG | OXYGEN SATURATION: 98 % | RESPIRATION RATE: 18 BRPM

## 2021-12-13 DIAGNOSIS — G89.29 CHRONIC BILATERAL LOW BACK PAIN WITHOUT SCIATICA: ICD-10-CM

## 2021-12-13 DIAGNOSIS — E66.01 CLASS 3 SEVERE OBESITY DUE TO EXCESS CALORIES WITH SERIOUS COMORBIDITY AND BODY MASS INDEX (BMI) OF 50.0 TO 59.9 IN ADULT (HCC): Chronic | ICD-10-CM

## 2021-12-13 DIAGNOSIS — R10.2 PELVIC PRESSURE IN FEMALE: ICD-10-CM

## 2021-12-13 DIAGNOSIS — M54.50 CHRONIC BILATERAL LOW BACK PAIN WITHOUT SCIATICA: ICD-10-CM

## 2021-12-13 DIAGNOSIS — F41.0 GENERALIZED ANXIETY DISORDER WITH PANIC ATTACKS: Chronic | ICD-10-CM

## 2021-12-13 DIAGNOSIS — M54.50 ACUTE MIDLINE LOW BACK PAIN WITHOUT SCIATICA: Primary | ICD-10-CM

## 2021-12-13 DIAGNOSIS — F41.1 GENERALIZED ANXIETY DISORDER WITH PANIC ATTACKS: Chronic | ICD-10-CM

## 2021-12-13 PROCEDURE — 99214 OFFICE O/P EST MOD 30 MIN: CPT | Performed by: FAMILY MEDICINE

## 2021-12-13 RX ORDER — HYDROCODONE BITARTRATE AND ACETAMINOPHEN 7.5; 325 MG/1; MG/1
1 TABLET ORAL EVERY 6 HOURS PRN
Qty: 12 TABLET | Refills: 0 | Status: SHIPPED | OUTPATIENT
Start: 2021-12-13 | End: 2021-12-17 | Stop reason: SDUPTHER

## 2021-12-13 RX ORDER — ESTRADIOL 2 MG/1
2 TABLET ORAL DAILY
Qty: 30 TABLET | Refills: 0 | Status: SHIPPED | OUTPATIENT
Start: 2021-12-13 | End: 2022-03-10 | Stop reason: SDUPTHER

## 2021-12-13 NOTE — PROGRESS NOTES
"Chief Complaint  Back Pain, Pelvic Pain, discuss medications, and Er follow up    Subjective          Raven Eckert presents to [facility]  She is here today for an ER visit for worsening back pain with changes.  She has 2 children.  She is a smoker.  She has a past medical history significant for arthritis, tobacco abuse, diabetes, bipolar, hypoglycemia, muscle cramps, chronic low back pain with history of back surgery and seasonal allergies.     She is complaining of worsening low back pain. She has been having less sensation in having a bowel movement. She is feeling a pressure in her bladder when she urinates. She is having pelvic pain as well. Her symptoms started one week ago.  She went to [facility] a emergency room.  While in the emergency room imaging as well as physical exam and pelvic exam was done.  There were no significant findings noted in the chart.  She was given steroids and it has helped. She is having 6-7 out of 10 pain today in her low back. She denies trauma to her back.    The patient has no other complaints today and denies chest pain, shortness of breath, weakness, nausea, vomiting, diarrhea, dizziness or syncopal event.         Objective   Vital Signs:   /87 (BP Location: Left arm, Patient Position: Sitting)   Pulse 112   Temp 97.5 °F (36.4 °C)   Resp 18   Ht 167.6 cm (65.98\")   Wt (!) 161 kg (356 lb)   SpO2 98%   BMI 57.49 kg/m²     Physical Exam  Vitals reviewed.   Constitutional:       Appearance: Normal appearance. She is well-developed. She is obese.   HENT:      Head: Normocephalic and atraumatic.      Right Ear: External ear normal.      Left Ear: External ear normal.      Mouth/Throat:      Pharynx: No oropharyngeal exudate.   Eyes:      Conjunctiva/sclera: Conjunctivae normal.      Pupils: Pupils are equal, round, and reactive to light.   Neck:      Vascular: No carotid bruit.   Cardiovascular:      Rate and Rhythm: Normal rate and " regular rhythm.      Heart sounds: No murmur heard.  No friction rub. No gallop.    Pulmonary:      Effort: Pulmonary effort is normal.      Breath sounds: Normal breath sounds. No wheezing or rhonchi.   Abdominal:      General: There is no distension.   Skin:     General: Skin is warm and dry.   Neurological:      Mental Status: She is alert and oriented to person, place, and time.      Cranial Nerves: No cranial nerve deficit.      Motor: No weakness.   Psychiatric:         Mood and Affect: Mood and affect normal.         Behavior: Behavior normal.         Thought Content: Thought content normal.         Judgment: Judgment normal.        Result Review :     CMP    CMP 3/25/21 12/10/21   Glucose 113 (A) 80   BUN 12 9   Creatinine 1.01 (A) 1.01 (A)   eGFR Non African Am  64   Sodium 139 140   Potassium 4.8 4.4   Chloride 101 106   Calcium 9.2 9.8   Albumin 4.2 4.20   Total Bilirubin 0.18 (A) <0.2   Alkaline Phosphatase 91 88   AST (SGOT) 39 27   ALT (SGPT) 72 (A) 44 (A)   (A) Abnormal value            CBC    CBC 3/25/21 12/10/21   WBC 11.76 (A) 9.59   RBC 4.72 4.27   Hemoglobin 14.7 13.5   Hematocrit 44.8 39.3   MCV 94.9 92.0   MCH 31.1 (A) 31.6   MCHC 32.8 (A) 34.4   RDW 12.7 12.3   Platelets 277 256   (A) Abnormal value                      Assessment and Plan    Diagnoses and all orders for this visit:    1. Acute midline low back pain without sciatica (Primary)  Comments:  The patient was given order for an MRI with and without contrast of her lumbar spine.  She was given referral to neurosurgery.  Told go to ER if worsens.  Orders:  -     MRI Lumbar Spine With & Without Contrast; Future    2. Pelvic pressure in female  Comments:  Pelvic exam in the emergency room was benign.  Cultures were within normal limits. Pt's pelvic pain could be coming from her back and is concerning. ER if worse  Orders:  -     MRI Lumbar Spine With & Without Contrast; Future    3. Class 3 severe obesity due to excess calories with  serious comorbidity and body mass index (BMI) of 50.0 to 59.9 in adult ([facility])  Assessment & Plan:  Patient's (Body mass index is 57.49 kg/m².) indicates that they are morbidly obese (BMI > 40 or > 35 with obesity - related health condition) with health conditions that include none . Weight is unchanged. BMI is is above average; BMI management plan is completed. We discussed low calorie, low carb based diet program, portion control and increasing exercise.       4. Chronic bilateral low back pain without sciatica  Comments:  The patient was given referral today to pain management for her chronic low back pain.  She also was given a repeat MRI order with and without contrast.  Orders:  -     Ambulatory Referral to Pain Management    5. Generalized anxiety disorder with panic attacks  Assessment & Plan:  Psychological condition is improving with lifestyle modifications.  Continue current treatment regimen.  Psychological condition  will be reassessed at the next regular appointment.      Other orders  -     HYDROcodone-acetaminophen (Norco) 7.5-325 MG per tablet; Take 1 tablet by mouth Every 6 (Six) Hours As Needed for Moderate Pain .  Dispense: 12 tablet; Refill: 0  -     estradiol (ESTRACE) 2 MG tablet; Take 1 tablet by mouth Daily.  Dispense: 30 tablet; Refill: 0      Follow Up   Return in about 4 weeks (around 1/10/2022).  Patient was given instructions and counseling regarding her condition or for health maintenance advice. Please see specific information pulled into the AVS if appropriate.

## 2021-12-16 PROBLEM — Z71.3 ENCOUNTER FOR WEIGHT LOSS COUNSELING: Chronic | Status: RESOLVED | Noted: 2021-08-12 | Resolved: 2021-12-16

## 2021-12-16 PROBLEM — F41.0 GENERALIZED ANXIETY DISORDER WITH PANIC ATTACKS: Chronic | Status: ACTIVE | Noted: 2021-07-18

## 2021-12-16 PROBLEM — F41.1 GENERALIZED ANXIETY DISORDER WITH PANIC ATTACKS: Chronic | Status: ACTIVE | Noted: 2021-07-18

## 2021-12-16 NOTE — ASSESSMENT & PLAN NOTE
Patient's (Body mass index is 57.49 kg/m².) indicates that they are morbidly obese (BMI > 40 or > 35 with obesity - related health condition) with health conditions that include none . Weight is unchanged. BMI is is above average; BMI management plan is completed. We discussed low calorie, low carb based diet program, portion control and increasing exercise.

## 2021-12-17 ENCOUNTER — TELEPHONE (OUTPATIENT)
Dept: FAMILY MEDICINE CLINIC | Facility: CLINIC | Age: 31
End: 2021-12-17

## 2021-12-17 RX ORDER — HYDROCODONE BITARTRATE AND ACETAMINOPHEN 7.5; 325 MG/1; MG/1
1 TABLET ORAL EVERY 6 HOURS PRN
Qty: 12 TABLET | Refills: 0 | Status: SHIPPED | OUTPATIENT
Start: 2021-12-17 | End: 2022-03-10 | Stop reason: SDUPTHER

## 2021-12-17 RX ORDER — CYCLOBENZAPRINE HCL 10 MG
10 TABLET ORAL 3 TIMES DAILY PRN
Qty: 90 TABLET | Refills: 1 | Status: SHIPPED | OUTPATIENT
Start: 2021-12-17 | End: 2022-11-15

## 2021-12-17 NOTE — TELEPHONE ENCOUNTER
Patient called and stated that she would like to have the norco refilled to get her through the weekend and she would also like flexeril because the robaxin is not working please advise [facility] [place] [facility].

## 2022-01-03 ENCOUNTER — APPOINTMENT (OUTPATIENT)
Dept: MRI IMAGING | Facility: HOSPITAL | Age: 32
End: 2022-01-03

## 2022-01-13 ENCOUNTER — HOSPITAL ENCOUNTER (OUTPATIENT)
Dept: MRI IMAGING | Facility: HOSPITAL | Age: 32
Discharge: HOME | End: 2022-01-13
Admitting: FAMILY MEDICINE

## 2022-01-13 DIAGNOSIS — R10.2 PELVIC PRESSURE IN FEMALE: ICD-10-CM

## 2022-01-13 DIAGNOSIS — M54.50 ACUTE MIDLINE LOW BACK PAIN WITHOUT SCIATICA: ICD-10-CM

## 2022-01-13 PROCEDURE — 72148 MRI LUMBAR SPINE W/O DYE: CPT

## 2022-01-14 ENCOUNTER — OFFICE VISIT (OUTPATIENT)
Dept: FAMILY MEDICINE CLINIC | Facility: CLINIC | Age: 32
End: 2022-01-14

## 2022-01-14 VITALS
TEMPERATURE: 97.9 F | DIASTOLIC BLOOD PRESSURE: 81 MMHG | HEIGHT: 66 IN | WEIGHT: 293 LBS | SYSTOLIC BLOOD PRESSURE: 143 MMHG | OXYGEN SATURATION: 97 % | HEART RATE: 112 BPM | BODY MASS INDEX: 47.09 KG/M2 | RESPIRATION RATE: 18 BRPM

## 2022-01-14 DIAGNOSIS — E66.01 CLASS 3 SEVERE OBESITY DUE TO EXCESS CALORIES WITH SERIOUS COMORBIDITY AND BODY MASS INDEX (BMI) OF 50.0 TO 59.9 IN ADULT: Chronic | ICD-10-CM

## 2022-01-14 DIAGNOSIS — M48.061 FORAMINAL STENOSIS OF LUMBAR REGION: Primary | ICD-10-CM

## 2022-01-14 DIAGNOSIS — F41.1 GENERALIZED ANXIETY DISORDER WITH PANIC ATTACKS: Chronic | ICD-10-CM

## 2022-01-14 DIAGNOSIS — F41.0 GENERALIZED ANXIETY DISORDER WITH PANIC ATTACKS: Chronic | ICD-10-CM

## 2022-01-14 PROCEDURE — 99214 OFFICE O/P EST MOD 30 MIN: CPT | Performed by: FAMILY MEDICINE

## 2022-01-14 NOTE — PROGRESS NOTES
"Chief Complaint  Follow-up and Imaging results    Subjective          Raven Eckert presents to [facility]  She is here today for a follow-up for her chronic medical conditions.  She has 2 children.  She is a smoker.  She has a past medical history significant for arthritis, tobacco abuse, diabetes, bipolar, hypoglycemia, muscle cramps, chronic low back pain with history of back surgery and seasonal allergies.     She had an MRI of her lumbar spine since her last visit. It was reveling for L5-S1 foraminal stenosis. Her symptoms have improved since her last visit.      The patient has no other complaints today and denies chest pain, shortness of breath, weakness, nausea, vomiting, diarrhea, dizziness or syncopal event.      Objective   Vital Signs:   /81 (BP Location: Left arm, Patient Position: Sitting)   Pulse 112   Temp 97.9 °F (36.6 °C)   Resp 18   Ht 167.6 cm (65.98\")   Wt (!) 162 kg (356 lb 3.2 oz)   SpO2 97%   BMI 57.52 kg/m²     Physical Exam  Vitals reviewed.   Constitutional:       Appearance: Normal appearance. She is well-developed. She is obese.   HENT:      Head: Normocephalic and atraumatic.      Right Ear: External ear normal.      Left Ear: External ear normal.      Mouth/Throat:      Pharynx: No oropharyngeal exudate.   Eyes:      Conjunctiva/sclera: Conjunctivae normal.      Pupils: Pupils are equal, round, and reactive to light.   Neck:      Vascular: No carotid bruit.   Cardiovascular:      Rate and Rhythm: Normal rate and regular rhythm.      Heart sounds: No murmur heard.  No friction rub. No gallop.    Pulmonary:      Effort: Pulmonary effort is normal.      Breath sounds: Normal breath sounds. No wheezing or rhonchi.   Abdominal:      General: There is no distension.   Skin:     General: Skin is warm and dry.   Neurological:      Mental Status: She is alert and oriented to person, place, and time.      Cranial Nerves: No cranial nerve " deficit.      Motor: No weakness.   Psychiatric:         Mood and Affect: Mood and affect normal.         Behavior: Behavior normal.         Thought Content: Thought content normal.         Judgment: Judgment normal.        Result Review :     CMP    CMP 3/25/21 12/10/21   Glucose 113 (A) 80   BUN 12 9   Creatinine 1.01 (A) 1.01 (A)   eGFR Non African Am  64   Sodium 139 140   Potassium 4.8 4.4   Chloride 101 106   Calcium 9.2 9.8   Albumin 4.2 4.20   Total Bilirubin 0.18 (A) <0.2   Alkaline Phosphatase 91 88   AST (SGOT) 39 27   ALT (SGPT) 72 (A) 44 (A)   (A) Abnormal value            CBC    CBC 3/25/21 12/10/21   WBC 11.76 (A) 9.59   RBC 4.72 4.27   Hemoglobin 14.7 13.5   Hematocrit 44.8 39.3   MCV 94.9 92.0   MCH 31.1 (A) 31.6   MCHC 32.8 (A) 34.4   RDW 12.7 12.3   Platelets 277 256   (A) Abnormal value            Lipid Panel    Lipid Panel 3/25/21 3/25/21    1404 1404   Total Cholesterol 239 (A)    Triglycerides 478 (A)    HDL Cholesterol 36 (A)    LDL Cholesterol   134 (A)   (A) Abnormal value       Comments are available for some flowsheets but are not being displayed.           TSH    TSH 3/25/21   TSH 1.480                     Assessment and Plan    Diagnoses and all orders for this visit:    1. Foraminal stenosis of lumbar region (Primary)  Comments:  She was given a referral to Dr Street of neurosurgery for further medical evaluation and management of her condition.   Orders:  -     Ambulatory Referral to Neurosurgery    2. Class 3 severe obesity due to excess calories with serious comorbidity and body mass index (BMI) of 50.0 to 59.9 in adult (HCC)  Assessment & Plan:  Patient's (Body mass index is 57.52 kg/m².) indicates that they are morbidly obese (BMI > 40 or > 35 with obesity - related health condition) with health conditions that include none . Weight is unchanged. BMI is is above average; BMI management plan is completed. We discussed low calorie, low carb based diet program, portion control and  increasing exercise.       3. Generalized anxiety disorder with panic attacks  Assessment & Plan:  Psychological condition is improving with treatment.  Continue current treatment regimen.  Psychological condition  will be reassessed at the next regular appointment.        Follow Up   Return in about 3 months (around 4/14/2022).  Patient was given instructions and counseling regarding her condition or for health maintenance advice. Please see specific information pulled into the AVS if appropriate.

## 2022-01-19 ENCOUNTER — TELEPHONE (OUTPATIENT)
Dept: FAMILY MEDICINE CLINIC | Facility: CLINIC | Age: 32
End: 2022-01-19

## 2022-01-19 NOTE — TELEPHONE ENCOUNTER
----- Message from Fransisca Carver DO sent at 1/13/2022  5:44 PM EST -----  Results in [employer]

## 2022-02-08 ENCOUNTER — OFFICE VISIT (OUTPATIENT)
Dept: NEUROSURGERY | Facility: CLINIC | Age: 32
End: 2022-02-08

## 2022-02-08 VITALS
WEIGHT: 293 LBS | HEIGHT: 66 IN | HEART RATE: 113 BPM | SYSTOLIC BLOOD PRESSURE: 137 MMHG | BODY MASS INDEX: 47.09 KG/M2 | DIASTOLIC BLOOD PRESSURE: 72 MMHG

## 2022-02-08 DIAGNOSIS — Z98.890 HISTORY OF LUMBAR SURGERY: ICD-10-CM

## 2022-02-08 DIAGNOSIS — M54.42 CHRONIC MIDLINE LOW BACK PAIN WITH BILATERAL SCIATICA: ICD-10-CM

## 2022-02-08 DIAGNOSIS — M54.41 CHRONIC MIDLINE LOW BACK PAIN WITH BILATERAL SCIATICA: ICD-10-CM

## 2022-02-08 DIAGNOSIS — G89.29 CHRONIC MIDLINE LOW BACK PAIN WITH BILATERAL SCIATICA: ICD-10-CM

## 2022-02-08 DIAGNOSIS — M51.37 DEGENERATION OF INTERVERTEBRAL DISC AT L5-S1 LEVEL: Primary | ICD-10-CM

## 2022-02-08 PROCEDURE — 99214 OFFICE O/P EST MOD 30 MIN: CPT | Performed by: PHYSICIAN ASSISTANT

## 2022-02-08 RX ORDER — DEXAMETHASONE 6 MG/1
6 TABLET ORAL DAILY
COMMUNITY
Start: 2022-01-21 | End: 2022-03-10

## 2022-02-08 NOTE — PROGRESS NOTES
"Patient being seen for today for Back Pain (severe pelvic pain with urinary and stool incontinence ) and Leg Pain  .    Subjective    Raven Eckert is a 31 y.o. female that presents with Back Pain (severe pelvic pain with urinary and stool incontinence ) and Leg Pain  .    HPI  Previously: Last seen on 7/21/2020 for complaints of low back pain extending into the left lower extremity in the posterior thigh to the knee, she was found to have degenerative disc disease worse at L4-L5 and L5-S1 with granulation tissue on the left at L5-S1 from previous laminectomy.  She was referred for physical therapy and pain management to discuss lumbar epidural steroid blocks. She had a new lumbar MRI on 1/13/2022.    Today: She reports her concerns today are pain in the bladder/vaginal region for the past 2.5-3 months. She went to the ER about 2 months ago because the pain grew to intolerable levels, but they reportedly did not find any cause for her pain. She went back to the ER several weeks later and was told that her issues are stemming from her back - she went to her PCP who referred her back to this office. This issue resolved for a time but restarted about 2 weeks ago and lasted for about 2 days - after having the most recent MRI performed.    Now she reports worsening pain into the legs with pain extending into the thighs and sometimes into the calves and sides of the feet which she describes as \"locking up\" with spasms followed by \"soreness\". Walking seems to relieve the spasms somewhat.    She reports numbness and tingling in the left lateral thigh area. She denies weakness.    She does report some bowel and bladder incontinence with her painful episodes which has been gradual for a few months now.     reports that she has been smoking. She has been smoking about 0.50 packs per day. She has never used smokeless tobacco.    Review of Systems   Gastrointestinal: Positive for abdominal pain.   Musculoskeletal: " Positive for back pain, gait problem and myalgias.   Neurological: Positive for weakness and numbness.       Objective   Vitals:    02/08/22 1556   BP: 137/72   Pulse: 113        Physical Exam  Constitutional:       Appearance: Normal appearance. She is obese.   Pulmonary:      Effort: Pulmonary effort is normal.   Musculoskeletal:         General: Tenderness (midline lumbar spine) present.      Comments: SLR negative bilaterally   Neurological:      General: No focal deficit present.      Mental Status: She is alert and oriented to person, place, and time.      Sensory: Sensory deficit (left lateral thigh) present.      Motor: No weakness.      Deep Tendon Reflexes: Reflexes normal.   Psychiatric:         Mood and Affect: Mood normal.         Behavior: Behavior normal.          Result Review   I have personally reviewed the MRI of lumbar spine without contrast from 1/13/2022 which shows multilevel degenerative disc disease and facet arthritis, this is worse at the L5-S1 and L4-L5 disc spaces, there appears to be previous laminectomy at L5-S1 on the left, there is mild bilateral foraminal narrowing at L5-S1, otherwise there is no significant central canal or foraminal narrowing.     Assessment and Plan {CC Problem List  Visit Diagnosis  ROS  Review (Popup)  [facility]  Quality  BestPractice  Medications  SmartSets  SnapShot Encounters  Media :23}   Diagnoses and all orders for this visit:    1. Degeneration of intervertebral disc at L5-S1 level (Primary)  -     Ambulatory Referral to Pain Management    2. Chronic midline low back pain with bilateral sciatica  -     Ambulatory Referral to Pain Management    3. History of lumbar surgery  -     Ambulatory Referral to Pain Management    I do not see any findings on the recent MRI that would explain the bowel or bladder incontinence. I would recommend she follow-up with her PCP to discuss further and possibly get specialty referrals for Urology or  Gastroenterology.    I do not see that surgery would be helpful for her pain complaints.    She does have some spinal narrowing worse at L5-S1, but this is mild.    She may benefit from epidural steroid injections for the pain extending into the thighs.    She also has facet arthropathy and may be a candidate for MBBs/RFA for pain localized to the back.    I will refer her to [facility] to consult for these possible treatments.    We discussed the importance of smoking/nicotine cessation. Smoking/nicotine use has multiple health risks. In particular related to the spine, nicotine increases the incidence of lower back pain, speeds up the progression of degenerative disc disease and dramatically reduces healing after spine surgery (particularly a fusion operation).     The patient was counseled on basic recommendations for the reduction and prevention of back, neck, or spine pain in association with spinal disorders, including: cessation/avoidance of nicotine use, maintenance of a healthy BMI and weight, focusing on building/maintaining core strength through core exercise, and avoidance of activities which worsen the pain. The patient will monitor for changes in symptoms and notify our clinic of these changes as needed.    She will follow-up here PRN for worsening symptoms.    Follow Up {Instructions Charge Capture  Follow-up Communications :23}   Return if symptoms worsen or fail to improve.

## 2022-03-09 ENCOUNTER — TELEPHONE (OUTPATIENT)
Dept: NEUROSURGERY | Facility: CLINIC | Age: 32
End: 2022-03-09

## 2022-03-09 NOTE — TELEPHONE ENCOUNTER
PATIENT WAS REFERRED TO PAIN MGMT IN [place] BUT WAS REFUSED SERVICE BECAUSE SHE HAD BEEN DISCHARGED FROM ANOTHER PAIN CLINIC.  SHE WOULD LIKE TO BE REFERRED TO THE ONE IN THIS [facility].    [phone]

## 2022-03-10 ENCOUNTER — TELEMEDICINE (OUTPATIENT)
Dept: FAMILY MEDICINE CLINIC | Facility: CLINIC | Age: 32
End: 2022-03-10

## 2022-03-10 DIAGNOSIS — R30.0 DYSURIA: Primary | Chronic | ICD-10-CM

## 2022-03-10 DIAGNOSIS — M96.1 LUMBAR POST-LAMINECTOMY SYNDROME: Chronic | ICD-10-CM

## 2022-03-10 DIAGNOSIS — F17.219 CIGARETTE NICOTINE DEPENDENCE WITH NICOTINE-INDUCED DISORDER: Chronic | ICD-10-CM

## 2022-03-10 PROCEDURE — 99442 PR PHYS/QHP TELEPHONE EVALUATION 11-20 MIN: CPT | Performed by: FAMILY MEDICINE

## 2022-03-10 RX ORDER — HYDROCODONE BITARTRATE AND ACETAMINOPHEN 7.5; 325 MG/1; MG/1
1 TABLET ORAL EVERY 6 HOURS PRN
Qty: 12 TABLET | Refills: 0 | Status: SHIPPED | OUTPATIENT
Start: 2022-03-10 | End: 2022-03-25 | Stop reason: SDUPTHER

## 2022-03-10 RX ORDER — ESTRADIOL 2 MG/1
2 TABLET ORAL DAILY
Qty: 30 TABLET | Refills: 5 | Status: SHIPPED | OUTPATIENT
Start: 2022-03-10 | End: 2022-11-15

## 2022-03-10 RX ORDER — LURASIDONE HYDROCHLORIDE 80 MG/1
80 TABLET, FILM COATED ORAL DAILY
Qty: 90 TABLET | Refills: 1 | Status: SHIPPED | OUTPATIENT
Start: 2022-03-10

## 2022-03-10 RX ORDER — IBUPROFEN 800 MG/1
800 TABLET ORAL 3 TIMES DAILY PRN
COMMUNITY
Start: 2022-03-05

## 2022-03-10 NOTE — PROGRESS NOTES
Chief Complaint  needs referral for urology  and Medication Problem (Hasn't seen pain management and is wanting a temporary rx for controlled )    Subjective     {Problem List  Visit Diagnosis   Encounters  Notes  Medications  Labs  Result Review Imaging  Media :23}     Raven Eckert presents to [facility]  History of Present Illness    Objective   Vital Signs:   There were no vitals taken for this visit.    Physical Exam   Result Review :{Labs  Result Review  Imaging  Med Tab  Media  Procedures :23}   {The following data was reviewed by (Optional):87196}  {Ambulatory Labs (Optional):26625}  {Data reviewed (Optional):80922:::1}          Assessment and Plan {CC Problem List  Visit Diagnosis   ROS  Review (Popup)  Health Maintenance  Quality  BestPractice  Medications  SmartSets  SnapShot Encounters  Media :23}   There are no diagnoses linked to this encounter.  {Time Spent (Optional):57894}  Follow Up {Instructions Charge Capture  Follow-up Communications :23}  No follow-ups on file.  Patient was given instructions and counseling regarding her condition or for health maintenance advice. Please see specific information pulled into the AVS if appropriate.

## 2022-03-11 PROBLEM — F17.219 CIGARETTE NICOTINE DEPENDENCE WITH NICOTINE-INDUCED DISORDER: Chronic | Status: ACTIVE | Noted: 2022-03-11

## 2022-03-11 PROBLEM — M96.1 LUMBAR POST-LAMINECTOMY SYNDROME: Chronic | Status: ACTIVE | Noted: 2021-07-18

## 2022-03-11 PROBLEM — R30.0 DYSURIA: Chronic | Status: ACTIVE | Noted: 2022-03-11

## 2022-03-11 NOTE — ASSESSMENT & PLAN NOTE
The patient was given 3 days of Norco 5/325 mg to be used as directed.  A Yo was reviewed and is within normal limits.  She denies having an opioid use disorder.

## 2022-03-11 NOTE — PROGRESS NOTES
Chief Complaint  needs referral for urology  and Medication Problem (Hasn't seen pain management and is wanting a temporary rx for controlled )    Subjective          Raven Eckert presents to [facility]  Consent was given by the patient for a telephone encounter today.     She is being managed today for a telephone encounter.  She has 2 children.  She is a smoker.  She has past medical history significant for arthritis, tobacco abuse, diabetes, bipolar, hypoglycemia, muscle cramps, chronic low back pain with history of back surgery, morbid obesity, and seasonal allergies.    Today she is complaining of having a pressure with urination.  She has been seen by neurology and they determined it was not neurologic in nature.  She is asking for referral today to urology.    She is also been referred to pain management for her chronic low back pain.  She has a few more days until her appointment and she is asking for 3 days of pain medicine to help her build tolerate her pain until she is able to go to her appointment.    The patient has no other complaints today and denies chest pain, shortness of breath, weakness, numbness, nausea, vomiting, diarrhea, dizziness or syncopal event.        Objective   Vital Signs:   There were no vitals taken for this visit.    Physical Exam   Result Review :     CMP    CMP 3/25/21 12/10/21   Glucose 113 (A) 80   BUN 12 9   Creatinine 1.01 (A) 1.01 (A)   eGFR Non African Am  64   Sodium 139 140   Potassium 4.8 4.4   Chloride 101 106   Calcium 9.2 9.8   Albumin 4.2 4.20   Total Bilirubin 0.18 (A) <0.2   Alkaline Phosphatase 91 88   AST (SGOT) 39 27   ALT (SGPT) 72 (A) 44 (A)   (A) Abnormal value            CBC    CBC 3/25/21 12/10/21   WBC 11.76 (A) 9.59   RBC 4.72 4.27   Hemoglobin 14.7 13.5   Hematocrit 44.8 39.3   MCV 94.9 92.0   MCH 31.1 (A) 31.6   MCHC 32.8 (A) 34.4   RDW 12.7 12.3   Platelets 277 256   (A) Abnormal value            Lipid Panel     Lipid Panel 3/25/21 3/25/21    1404 1404   Total Cholesterol 239 (A)    Triglycerides 478 (A)    HDL Cholesterol 36 (A)    LDL Cholesterol   134 (A)   (A) Abnormal value       Comments are available for some flowsheets but are not being displayed.           TSH    TSH 3/25/21   TSH 1.480                     Assessment and Plan    Diagnoses and all orders for this visit:    1. Dysuria (Primary)  Assessment & Plan:  The patient was given referral to urology for further medical evaluation and management.      2. Lumbar post-laminectomy syndrome  Assessment & Plan:  The patient was given 3 days of Norco 5/325 mg to be used as directed.  A Yo was reviewed and is within normal limits.  She denies having an opioid use disorder.      3. Cigarette nicotine dependence with nicotine-induced disorder  Assessment & Plan:  Tobacco use is unchanged.  Smoking cessation counseling was provided.  Tobacco use will be reassessed at the next regular appointment.      Other orders  -     lurasidone (LATUDA) 80 MG tablet tablet; Take 1 tablet by mouth Daily.  Dispense: 90 tablet; Refill: 1  -     HYDROcodone-acetaminophen (Norco) 7.5-325 MG per tablet; Take 1 tablet by mouth Every 6 (Six) Hours As Needed for Moderate Pain .  Dispense: 12 tablet; Refill: 0  -     estradiol (ESTRACE) 2 MG tablet; Take 1 tablet by mouth Daily.  Dispense: 30 tablet; Refill: 5    I spent 20 minutes caring for Raven on this date of service. This time includes time spent by me in the following activities:preparing for the visit, reviewing tests, performing a medically appropriate examination and/or evaluation , counseling and educating the patient/family/caregiver, ordering medications, tests, or procedures and documenting information in the medical record  Follow Up   No follow-ups on file.  Patient was given instructions and counseling regarding her condition or for health maintenance advice. Please see specific information pulled into the AVS if  appropriate.

## 2022-03-14 ENCOUNTER — TELEPHONE (OUTPATIENT)
Dept: FAMILY MEDICINE CLINIC | Facility: CLINIC | Age: 32
End: 2022-03-14

## 2022-03-14 DIAGNOSIS — M96.1 LUMBAR POST-LAMINECTOMY SYNDROME: Primary | ICD-10-CM

## 2022-03-14 NOTE — TELEPHONE ENCOUNTER
Caller: Raven Eckert    Relationship: Self    Best call back number: [phone]    What is the medical concern/diagnosis: DEGENERATIVE DISEASE IN BACK    What specialty or service is being requested: PAIN MANAGEMENT    What is the provider, practice or medical service name: [facility] IN [place]    What is the office FAX number: [phone]    Any additional details: PATIENT ORIGINALLY HAD A REFERRAL PLACED BY NEUROLOGIST BUT SEVERAL LOCATIONS WERE NOT ACCEPTING HER. [facility] WILL TAKE HER BUT REFERRAL WILL NEED TO COME FROM PCP.

## 2022-03-21 ENCOUNTER — TELEPHONE (OUTPATIENT)
Dept: FAMILY MEDICINE CLINIC | Facility: CLINIC | Age: 32
End: 2022-03-21

## 2022-03-21 NOTE — TELEPHONE ENCOUNTER
Caller: Raven Eckert    Relationship to patient: Self    Best call back number: [phone] OKAY TO LEAVE A MESSAGE ON PHONE    Patient is needing: PATIENT CALLED IN AND SAID SHE WOULD LIKE TO HAVE HER REFERRAL TO PAIN MANAGEMENT IN [place] ([facility]). SHE SAID SHE CONTACTED PAIN MANAGEMENT AND THEY HAVE NOT RECEIVED REFERRAL. PLEASE CALL AND ADVISE.

## 2022-03-24 ENCOUNTER — TELEPHONE (OUTPATIENT)
Dept: FAMILY MEDICINE CLINIC | Facility: CLINIC | Age: 32
End: 2022-03-24

## 2022-03-24 NOTE — TELEPHONE ENCOUNTER
Patient called and stated that she is needing another refill for norco that her pain management apt is on 04/06/22. She just needs the qty of 12 until she can see them. Please advise.

## 2022-03-25 RX ORDER — HYDROCODONE BITARTRATE AND ACETAMINOPHEN 7.5; 325 MG/1; MG/1
1 TABLET ORAL EVERY 6 HOURS PRN
Qty: 12 TABLET | Refills: 0 | Status: SHIPPED | OUTPATIENT
Start: 2022-03-25 | End: 2022-11-15

## 2022-04-05 ENCOUNTER — TELEPHONE (OUTPATIENT)
Dept: FAMILY MEDICINE CLINIC | Facility: CLINIC | Age: 32
End: 2022-04-05

## 2022-04-05 DIAGNOSIS — L89.899 PRESSURE INJURY OF SKIN OF DORSUM OF LEFT FOOT, UNSPECIFIED INJURY STAGE: Primary | ICD-10-CM

## 2022-04-05 DIAGNOSIS — M79.672 LEFT FOOT PAIN: ICD-10-CM

## 2022-04-05 NOTE — TELEPHONE ENCOUNTER
Patient went to the ER and then [facility] and they were needing a referral left foot injury

## 2022-04-18 ENCOUNTER — TELEPHONE (OUTPATIENT)
Dept: FAMILY MEDICINE CLINIC | Facility: CLINIC | Age: 32
End: 2022-04-18

## 2022-04-18 ENCOUNTER — TELEPHONE (OUTPATIENT)
Dept: UROLOGY | Facility: CLINIC | Age: 32
End: 2022-04-18

## 2022-04-18 NOTE — TELEPHONE ENCOUNTER
CALL[facility]

## 2022-04-18 NOTE — TELEPHONE ENCOUNTER
[facility] SPECIAL[facility] CALLED TO INFORM US THAT THE PATIENT CANCELED THEIR APPOINTMENT  AND HAVE NOT RESCHEDULED THE APPOINTMENT

## 2022-10-14 ENCOUNTER — TELEPHONE (OUTPATIENT)
Dept: FAMILY MEDICINE CLINIC | Facility: CLINIC | Age: 32
End: 2022-10-14

## 2022-10-14 DIAGNOSIS — N93.0 BLEEDING AFTER INTERCOURSE: ICD-10-CM

## 2022-10-14 DIAGNOSIS — R10.2 PELVIC PRESSURE IN FEMALE: Primary | ICD-10-CM

## 2022-10-14 DIAGNOSIS — Z90.710 HISTORY OF TOTAL HYSTERECTOMY: ICD-10-CM

## 2022-11-15 ENCOUNTER — OFFICE VISIT (OUTPATIENT)
Dept: FAMILY MEDICINE CLINIC | Age: 32
End: 2022-11-15

## 2022-11-15 VITALS
HEIGHT: 65 IN | TEMPERATURE: 97.9 F | BODY MASS INDEX: 48.82 KG/M2 | HEART RATE: 85 BPM | SYSTOLIC BLOOD PRESSURE: 150 MMHG | WEIGHT: 293 LBS | DIASTOLIC BLOOD PRESSURE: 85 MMHG

## 2022-11-15 DIAGNOSIS — Z00.00 ENCOUNTER FOR MEDICAL EXAMINATION TO ESTABLISH CARE: ICD-10-CM

## 2022-11-15 DIAGNOSIS — R06.2 WHEEZING: ICD-10-CM

## 2022-11-15 DIAGNOSIS — M54.50 CHRONIC BILATERAL LOW BACK PAIN, UNSPECIFIED WHETHER SCIATICA PRESENT: Primary | ICD-10-CM

## 2022-11-15 DIAGNOSIS — N93.9 ABNORMAL VAGINAL BLEEDING: ICD-10-CM

## 2022-11-15 DIAGNOSIS — E66.01 CLASS 3 SEVERE OBESITY DUE TO EXCESS CALORIES WITH SERIOUS COMORBIDITY AND BODY MASS INDEX (BMI) OF 50.0 TO 59.9 IN ADULT: ICD-10-CM

## 2022-11-15 DIAGNOSIS — G89.29 CHRONIC BILATERAL LOW BACK PAIN, UNSPECIFIED WHETHER SCIATICA PRESENT: Primary | ICD-10-CM

## 2022-11-15 DIAGNOSIS — M79.605 PAIN IN BOTH LOWER EXTREMITIES: ICD-10-CM

## 2022-11-15 DIAGNOSIS — M79.604 PAIN IN BOTH LOWER EXTREMITIES: ICD-10-CM

## 2022-11-15 PROCEDURE — 99214 OFFICE O/P EST MOD 30 MIN: CPT | Performed by: NURSE PRACTITIONER

## 2022-11-15 RX ORDER — BUPRENORPHINE HYDROCHLORIDE AND NALOXONE HYDROCHLORIDE DIHYDRATE 8; 2 MG/1; MG/1
1 TABLET SUBLINGUAL 2 TIMES DAILY
COMMUNITY
Start: 2022-11-14

## 2022-11-15 NOTE — PROGRESS NOTES
Chief Complaint  Weight Loss and Pain Management     Subjective        Raven Eckert presents to [facility]  History of Present Illness     The patient presents to the office to establish care and discuss weight loss surgery. Vaccinations were discussed and she denies her flu, COVID, and pneumococcal at this time. She has had a full hysterectomy, but is requesting a consult to GYN for follow up d/t recent vaginal bleeding s/p intercourse. This has since resolved.     She has been previously seen by pain management in the past for chronic back and leg pain and is requesting a referral. She had been started on Norco 5mg for lumbar pain. It has been increased to 10mg. She wanted to get off of this and felt withdrawal symptoms. She has also tried Lyrica in the past. She is currently going to [facility] for current suboxone rx and is working with them to wean off. She was wanting to discuss possible treatment without high dose narcotics or non narcotic treatment options.     Basic labs were discussed and will be drawn at this visit.    The patient states that she wishes to have bariatric weight loss surgery in the near future. She has tried Phentermine, portion control, exercise, and decreased calorie intaket with only a 20 pound weight loss in the last 2 years. She has been to a nutritionist.  Multiple people in her family have had success with gastric bypass, and she states she is willing to make the long-term changes necessary to be successful.      Review of Systems   Constitutional: Negative for appetite change, chills, diaphoresis, fatigue, fever and unexpected weight loss.   HENT: Negative for dental problem, hearing loss, sore throat and trouble swallowing.    Eyes: Negative for blurred vision, double vision and visual disturbance.   Respiratory: Negative for cough, chest tightness and shortness of breath.    Cardiovascular: Negative for chest pain and palpitations.  "  Gastrointestinal: Negative for abdominal pain, constipation, diarrhea, nausea and vomiting.   Endocrine: Negative for polydipsia, polyphagia and polyuria.   Genitourinary: Negative for dysuria, menstrual problem, pelvic pressure and vaginal bleeding.   Musculoskeletal: Positive for back pain.   Skin: Negative for color change and bruise.   Neurological: Negative for dizziness, syncope and weakness.   Psychiatric/Behavioral: The patient is not nervous/anxious.        Objective   Vital Signs:  /85   Pulse 85   Temp 97.9 °F (36.6 °C) (Oral)   Ht 165.1 cm (65\")   Wt (!) 150 kg (331 lb 6.4 oz)   BMI 55.15 kg/m²   Estimated body mass index is 55.15 kg/m² as calculated from the following:    Height as of this encounter: 165.1 cm (65\").    Weight as of this encounter: 150 kg (331 lb 6.4 oz).          Physical Exam  Constitutional:       General: She is not in acute distress.     Appearance: Normal appearance. She is obese. She is not ill-appearing.   HENT:      Head: Normocephalic and atraumatic.      Right Ear: External ear normal.      Left Ear: External ear normal.      Nose: Nose normal.   Eyes:      Pupils: Pupils are equal, round, and reactive to light.   Neck:      Thyroid: No thyromegaly or thyroid tenderness.      Vascular: No carotid bruit.   Cardiovascular:      Rate and Rhythm: Normal rate and regular rhythm.      Heart sounds: Normal heart sounds. No murmur heard.  Pulmonary:      Effort: Pulmonary effort is normal.      Breath sounds: Examination of the right-upper field reveals wheezing. Examination of the left-upper field reveals wheezing. Examination of the right-middle field reveals wheezing. Examination of the left-middle field reveals wheezing. Examination of the right-lower field reveals wheezing. Examination of the left-lower field reveals wheezing. Wheezing present.   Abdominal:      Palpations: Abdomen is soft.      Tenderness: There is no abdominal tenderness.   Musculoskeletal:      " Cervical back: Normal range of motion. No tenderness.   Skin:     General: Skin is warm and dry.   Neurological:      Mental Status: She is alert and oriented to person, place, and time. Mental status is at baseline.   Psychiatric:         Mood and Affect: Mood normal.         Behavior: Behavior normal.         Thought Content: Thought content normal.         Judgment: Judgment normal.        Result Review :                Assessment and Plan   Diagnoses and all orders for this visit:    1. Chronic bilateral low back pain, unspecified whether sciatica present (Primary)  Comments:  Patient is currently on Suboxone 8-2mg sublingual once daily but wishes to wean off. Referral placed for pain management.  Orders:  -     Ambulatory Referral to Pain Management    2. Encounter for medical examination to establish care  Comments:  Vaccinations were discussed and patient refused at this time. Basic lab work to be drawn today. Referral placed for GYN, pain management, and bariatric surgery.  Orders:  -     Comprehensive metabolic panel; Future  -     Lipid panel; Future  -     Hepatitis C antibody; Future    3. Pain in both lower extremities  Comments:  Patient is currently on Suboxone 8-2mg sublingual once daily but wishes to wean off. Referral placed for pain management.    4. Wheezing  Comments:  Patient encouraged to use her inhaler PRN for wheezing.    5. Abnormal vaginal bleeding  Comments:  Continue to monitor and note vaginal bleeding. Referral initated.   Orders:  -     Ambulatory Referral to Obstetrics / Gynecology    6. Class 3 severe obesity due to excess calories with serious comorbidity and body mass index (BMI) of 50.0 to 59.9 in adult (HCC)  Assessment & Plan:  Patient's (Body mass index is 55.15 kg/m².) indicates that they are morbidly obese (BMI > 40 or > 35 with obesity - related health condition) with health conditions that include none . Weight is improving with lifestyle modifications. BMI is is above  average; BMI management plan is completed. We discussed portion control and increasing exercise. Also encouraged pt to keep food diary and discussed GLP1s. Due to insurance, these will not be an option. Referral initiated.     Orders:  -     Hemoglobin A1c; Future  -     Ambulatory Referral to Bariatric Surgery  Patient to notify office with any acute concerns or issues.  Patient verbalizes understanding, agrees with plan of care and has no further questions upon discharge.    Please note that portions of this note were completed with a voice recognition program.         Follow Up   Return in about 1 month (around 12/15/2022).  Patient was given instructions and counseling regarding her condition or for health maintenance advice. Please see specific information pulled into the AVS if appropriate.

## 2022-11-15 NOTE — ASSESSMENT & PLAN NOTE
Patient's (Body mass index is 55.15 kg/m².) indicates that they are morbidly obese (BMI > 40 or > 35 with obesity - related health condition) with health conditions that include none . Weight is improving with lifestyle modifications. BMI is is above average; BMI management plan is completed. We discussed portion control and increasing exercise. Also encouraged pt to keep food diary and discussed GLP1s. Due to insurance, these will not be an option. Referral initiated.

## 2022-11-28 ENCOUNTER — OFFICE VISIT (OUTPATIENT)
Dept: FAMILY MEDICINE CLINIC | Age: 32
End: 2022-11-28

## 2022-11-28 VITALS
SYSTOLIC BLOOD PRESSURE: 127 MMHG | DIASTOLIC BLOOD PRESSURE: 85 MMHG | TEMPERATURE: 99.2 F | HEIGHT: 65 IN | BODY MASS INDEX: 48.82 KG/M2 | OXYGEN SATURATION: 95 % | WEIGHT: 293 LBS | RESPIRATION RATE: 22 BRPM | HEART RATE: 90 BPM

## 2022-11-28 DIAGNOSIS — B34.9 ACUTE VIRAL SYNDROME: Primary | ICD-10-CM

## 2022-11-28 DIAGNOSIS — R05.1 ACUTE COUGH: ICD-10-CM

## 2022-11-28 DIAGNOSIS — R11.2 NAUSEA AND VOMITING, UNSPECIFIED VOMITING TYPE: ICD-10-CM

## 2022-11-28 LAB
EXPIRATION DATE: NORMAL
FLUAV AG UPPER RESP QL IA.RAPID: NOT DETECTED
FLUBV AG UPPER RESP QL IA.RAPID: NOT DETECTED
INTERNAL CONTROL: NORMAL
Lab: NORMAL
SARS-COV-2 AG UPPER RESP QL IA.RAPID: NOT DETECTED

## 2022-11-28 PROCEDURE — 99213 OFFICE O/P EST LOW 20 MIN: CPT | Performed by: PHYSICIAN ASSISTANT

## 2022-11-28 PROCEDURE — 87428 SARSCOV & INF VIR A&B AG IA: CPT | Performed by: PHYSICIAN ASSISTANT

## 2022-11-28 RX ORDER — DEXTROMETHORPHAN HYDROBROMIDE AND PROMETHAZINE HYDROCHLORIDE 15; 6.25 MG/5ML; MG/5ML
5 SYRUP ORAL 4 TIMES DAILY PRN
Qty: 118 ML | Refills: 0 | Status: SHIPPED | OUTPATIENT
Start: 2022-11-28

## 2022-11-28 NOTE — PROGRESS NOTES
Subjective     CHIEF COMPLAINT    Chief Complaint   Patient presents with   • Chills     Chills, body aches, cough, congestion, shortness of breath, nausea, vomiting after eating, loose stool, feverish feeling, and right ear pain.   Denies headache and sore throat.  Symptoms started yesterday. Has been around brother who had flu recently.             History of Present Illness  This is a 31-year-old female presenting to clinic complaining of chills, body aches, cough and congestion since yesterday.  She has also had some nausea and vomiting and subjective fevers.  Her brother tested positive for the flu and she was around him the day before his symptoms began.            Review of Systems   Constitutional: Positive for chills, fatigue and fever.   HENT: Positive for congestion and ear pain (right side). Negative for rhinorrhea and sore throat.    Respiratory: Positive for cough. Negative for shortness of breath and wheezing.    Cardiovascular: Negative for chest pain.   Gastrointestinal: Positive for nausea and vomiting (x1 last night). Negative for abdominal pain and diarrhea.   Musculoskeletal: Positive for myalgias.   Skin: Negative for rash.   Neurological: Negative for headaches.            Past Medical History:   Diagnosis Date   • Anxiety    • Arthritis    • Bipolar mood disorder ([facility])    • Broken bones    • Cataract    • Class 3 severe obesity due to excess calories with serious comorbidity and body mass index (BMI) of 50.0 to 59.9 in adult ([facility]) 2021   • DDD (degenerative disc disease), lumbar    • Depression    • Foot pain    • Generalized anxiety disorder with panic attacks 2021   • Heel pain    • Hypoglycemia    • Leg pain    • Limb swelling    • Muscle cramps    • Numbness in feet    • Prediabetes    • Psychiatric disorder    • Seasonal allergies             Past Surgical History:   Procedure Laterality Date   •  SECTION      x 2   • FOOT SURGERY     • HYSTERECTOMY     • LASER  "ABLATION     • LUMBAR DISCECTOMY Left 02/05/2020    MINIMALLY INVASIVE L5-S1   • OTHER SURGICAL HISTORY      METAL IMPLANTS            Family History   Problem Relation Age of Onset   • Diabetes Mother         Unspecified   • Arthritis Mother    • Arthritis Father    • Cancer Father         Unspecified            Social History     Socioeconomic History   • Marital status: Legally    Tobacco Use   • Smoking status: Every Day     Packs/day: 0.50     Types: Cigarettes   • Smokeless tobacco: Never   Vaping Use   • Vaping Use: Former   • Substances: Nicotine, Flavoring   Substance and Sexual Activity   • Alcohol use: Never   • Drug use: Never            Allergies   Allergen Reactions   • Lamotrigine GI Intolerance   • Sulfa Antibiotics Hives, Itching and Rash            Current Outpatient Medications on File Prior to Visit   Medication Sig Dispense Refill   • buprenorphine-naloxone (SUBOXONE) 8-2 MG per SL tablet Place 1 tablet under the tongue Daily.     • ibuprofen (ADVIL,MOTRIN) 800 MG tablet Take 800 mg by mouth 3 (Three) Times a Day As Needed.     • lurasidone (LATUDA) 80 MG tablet tablet Take 1 tablet by mouth Daily. (Patient taking differently: Take 40 mg by mouth Daily.) 90 tablet 1   • ProAir  (90 Base) MCG/ACT inhaler inahle 1-2 puffs BY MOUTH EVERY 4 TO 6 HOURS AS NEEDED FOR wheezing       No current facility-administered medications on file prior to visit.            /85 (BP Location: Right arm, Patient Position: Sitting, Cuff Size: Large Adult)   Pulse 90   Temp 99.2 °F (37.3 °C) (Oral)   Resp 22   Ht 165.1 cm (65\")   Wt (!) 146 kg (322 lb)   SpO2 95% Comment: room air  BMI 53.58 kg/m²          Objective     Physical Exam  Vitals and nursing note reviewed.   Constitutional:       General: She is not in acute distress.     Appearance: Normal appearance.   HENT:      Head: Normocephalic and atraumatic.      Right Ear: Tympanic membrane, ear canal and external ear normal.      Left " Ear: Tympanic membrane, ear canal and external ear normal.      Nose: Rhinorrhea present. No congestion.      Mouth/Throat:      Mouth: Mucous membranes are moist.      Pharynx: Oropharynx is clear. No posterior oropharyngeal erythema.   Eyes:      Extraocular Movements: Extraocular movements intact.      Conjunctiva/sclera: Conjunctivae normal.      Pupils: Pupils are equal, round, and reactive to light.   Cardiovascular:      Rate and Rhythm: Normal rate and regular rhythm.      Heart sounds: Normal heart sounds.   Pulmonary:      Effort: Pulmonary effort is normal. No respiratory distress.      Breath sounds: Normal breath sounds. No wheezing, rhonchi or rales.   Abdominal:      General: There is no distension.      Palpations: Abdomen is soft.      Tenderness: There is no abdominal tenderness.   Musculoskeletal:      Cervical back: Normal range of motion. No rigidity.   Skin:     General: Skin is warm and dry.   Neurological:      Mental Status: She is alert and oriented to person, place, and time.   Psychiatric:         Mood and Affect: Mood normal.         Behavior: Behavior normal.              Procedures                    Lab Results (last 24 hours)     Procedure Component Value Units Date/Time    POCT SARS-CoV-2 Antigen NEEL + Flu [[id number]]  (Normal) Collected: 11/28/22 1353    Specimen: Swab Updated: 11/28/22 1354     SARS Antigen Not Detected     Influenza A Antigen NEEL Not Detected     Influenza B Antigen NEEL Not Detected     Internal Control Passed     Lot Number [id number]     Expiration Date 8/25/23                No Radiology Exams Resulted Within Past 24 Hours                    Diagnoses and all orders for this visit:    1. Acute viral syndrome (Primary)  Comments:  No evidence of bacterial infection.  Recommend supportive care with Promethazine DM, force fluids, and antipyretics over-the-counter.    2. Acute cough  Comments:  Patient reports concern for acute bronchitis given history of similar  symptoms.  Will call in 48 hours for status check.  Orders:  -     POCT SARS-CoV-2 Antigen NEEL + Flu  -     promethazine-dextromethorphan (PROMETHAZINE-DM) 6.25-15 MG/5ML syrup; Take 5 mL by mouth 4 (Four) Times a Day As Needed for Cough.  Dispense: 118 mL; Refill: 0    3. Nausea and vomiting, unspecified vomiting type  -     POCT SARS-CoV-2 Antigen NEEL + Flu  -     promethazine-dextromethorphan (PROMETHAZINE-DM) 6.25-15 MG/5ML syrup; Take 5 mL by mouth 4 (Four) Times a Day As Needed for Cough.  Dispense: 118 mL; Refill: 0                           FOR FULL DISCHARGE INSTRUCTIONS/COMMENTS/HANDOUTS please see the   AVS

## 2022-12-12 ENCOUNTER — TELEPHONE (OUTPATIENT)
Dept: FAMILY MEDICINE CLINIC | Age: 32
End: 2022-12-12

## 2023-02-09 ENCOUNTER — OFFICE VISIT (OUTPATIENT)
Dept: FAMILY MEDICINE CLINIC | Age: 33
End: 2023-02-09
Payer: COMMERCIAL

## 2023-02-09 VITALS
DIASTOLIC BLOOD PRESSURE: 79 MMHG | HEIGHT: 65 IN | SYSTOLIC BLOOD PRESSURE: 135 MMHG | WEIGHT: 293 LBS | BODY MASS INDEX: 48.82 KG/M2 | HEART RATE: 81 BPM | OXYGEN SATURATION: 97 % | TEMPERATURE: 98.7 F

## 2023-02-09 DIAGNOSIS — R05.9 COUGH, UNSPECIFIED TYPE: ICD-10-CM

## 2023-02-09 DIAGNOSIS — J10.1 INFLUENZA B: Primary | ICD-10-CM

## 2023-02-09 LAB
EXPIRATION DATE: ABNORMAL
EXPIRATION DATE: NORMAL
FLUAV AG UPPER RESP QL IA.RAPID: NOT DETECTED
FLUBV AG UPPER RESP QL IA.RAPID: DETECTED
INTERNAL CONTROL: ABNORMAL
INTERNAL CONTROL: NORMAL
Lab: ABNORMAL
Lab: NORMAL
S PYO AG THROAT QL: NEGATIVE
SARS-COV-2 AG UPPER RESP QL IA.RAPID: NOT DETECTED

## 2023-02-09 PROCEDURE — 87428 SARSCOV & INF VIR A&B AG IA: CPT | Performed by: NURSE PRACTITIONER

## 2023-02-09 PROCEDURE — 99213 OFFICE O/P EST LOW 20 MIN: CPT | Performed by: NURSE PRACTITIONER

## 2023-02-09 PROCEDURE — 87880 STREP A ASSAY W/OPTIC: CPT | Performed by: NURSE PRACTITIONER

## 2023-02-09 RX ORDER — ONDANSETRON 4 MG/1
4 TABLET, FILM COATED ORAL EVERY 8 HOURS PRN
Qty: 20 TABLET | Refills: 0 | Status: SHIPPED | OUTPATIENT
Start: 2023-02-09

## 2023-02-09 NOTE — PROGRESS NOTES
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     Review of Systems   Constitutional: Positive for fatigue. Negative for fever, unexpected weight gain and unexpected weight loss.   HENT: Negative.    Eyes: Negative.    Respiratory: Positive for cough. Negative for shortness of breath and wheezing.    Cardiovascular: Negative for chest pain, palpitations and leg swelling.   Gastrointestinal: Positive for nausea. Negative for abdominal pain.   Endocrine: Negative.    Genitourinary: Negative for breast lump, breast pain, dysuria, frequency and urgency.   Musculoskeletal: Negative for gait problem.   Skin: Negative.    Neurological: Positive for headache. Negative for dizziness, tremors, seizures, weakness and memory problem.   Psychiatric/Behavioral: Negative for behavioral problems and suicidal ideas.         Allergies   Allergen Reactions   • Lamotrigine GI Intolerance   • Sulfa Antibiotics Hives, Itching and Rash      Past Medical History:   Diagnosis Date   • Anxiety    • Arthritis    • Bipolar mood disorder ([facility])    • Broken bones 2012   • Cataract    • Class 3 severe obesity due to excess calories with serious comorbidity and body mass index (BMI) of 50.0 to 59.9 in adult ([facility]) 7/18/2021   • DDD (degenerative disc disease), lumbar    • Depression    • Foot pain    • Generalized anxiety disorder with panic attacks 7/18/2021   • Heel pain    • Hypoglycemia    • Leg pain    • Limb swelling    • Muscle cramps    • Numbness in feet    • Prediabetes    • Psychiatric disorder    • Seasonal allergies      Current Outpatient Medications   Medication Sig Dispense Refill   •  buprenorphine-naloxone (SUBOXONE) 8-2 MG per SL tablet Place 1 tablet under the tongue 2 (Two) Times a Day.     • ibuprofen (ADVIL,MOTRIN) 800 MG tablet Take 800 mg by mouth 3 (Three) Times a Day As Needed.     • lurasidone (LATUDA) 80 MG tablet tablet Take 1 tablet by mouth Daily. 90 tablet 1   • ProAir  (90 Base) MCG/ACT inhaler inahle 1-2 puffs BY MOUTH EVERY 4 TO 6 HOURS AS NEEDED FOR wheezing     • promethazine-dextromethorphan (PROMETHAZINE-DM) 6.25-15 MG/5ML syrup Take 5 mL by mouth 4 (Four) Times a Day As Needed for Cough. 118 mL 0   • ondansetron (Zofran) 4 MG tablet Take 1 tablet by mouth Every 8 (Eight) Hours As Needed for Nausea or Vomiting. 20 tablet 0     No current facility-administered medications for this visit.     Past Surgical History:   Procedure Laterality Date   •  SECTION      x 2   • FOOT SURGERY     • HYSTERECTOMY     • LASER ABLATION     • LUMBAR DISCECTOMY Left 2020    MINIMALLY INVASIVE L5-S1   • OTHER SURGICAL HISTORY      METAL IMPLANTS      Social History     Tobacco Use   • Smoking status: Every Day     Packs/day: 0.50     Types: Cigarettes   • Smokeless tobacco: Never   Vaping Use   • Vaping Use: Former   • Substances: Nicotine, Flavoring   Substance Use Topics   • Alcohol use: Never   • Drug use: Never     Family History   Problem Relation Age of Onset   • Diabetes Mother         Unspecified   • Arthritis Mother    • Arthritis Father    • Cancer Father         Unspecified     Health Maintenance Due   Topic Date Due   • Pneumococcal Vaccine 0-64 (1 - PCV) Never done   • HEPATITIS C SCREENING  Never done   • ANNUAL PHYSICAL  Never done   • COVID-19 Vaccine (2 - Pfizer series) 2022      Immunization History   Administered Date(s) Administered   • COVID-19 (PFIZER) PURPLE CAP 2022   • Tdap 2020        Objective     Vitals:    23 1249   BP: 135/79   BP Location: Left arm   Patient Position: Sitting   Pulse: 81   Temp: 98.7 °F (37.1 °C)  "  TempSrc: Oral   SpO2: 97%   Weight: (!) 142 kg (312 lb 9.6 oz)   Height: 165.1 cm (65\")     Body mass index is 52.02 kg/m².     Physical Exam  Vitals and nursing note reviewed.   Constitutional:       Appearance: Normal appearance.   Neck:      Vascular: No carotid bruit.   Cardiovascular:      Rate and Rhythm: Normal rate and regular rhythm.      Heart sounds: Normal heart sounds.   Pulmonary:      Effort: Pulmonary effort is normal.      Breath sounds: Normal breath sounds.   Musculoskeletal:         General: Normal range of motion.   Skin:     General: Skin is warm and dry.   Neurological:      General: No focal deficit present.      Mental Status: She is alert.   Psychiatric:         Mood and Affect: Mood normal.         Behavior: Behavior normal.           Result Review :    Office Visit on 02/09/2023   Component Date Value Ref Range Status   • SARS Antigen 02/09/2023 Not Detected  Not Detected, Presumptive Negative Final   • Influenza A Antigen NEEL 02/09/2023 Not Detected  Not Detected Final   • Influenza B Antigen NEEL 02/09/2023 Detected (A)  Not Detected Final   • Internal Control 02/09/2023 Passed  Passed Final   • Lot Number 02/09/2023 [id number]   Final   • Expiration Date 02/09/2023 11/19/23   Final   • Rapid Strep A Screen 02/09/2023 Negative  Negative, VALID, INVALID, Not Performed Final   • Internal Control 02/09/2023 Passed  Passed Final   • Lot Number 02/09/2023 [id number]   Final   • Expiration Date 02/09/2023 5/31/24   Final   Office Visit on 11/28/2022   Component Date Value Ref Range Status   • SARS Antigen 11/28/2022 Not Detected  Not Detected, Presumptive Negative Final   • Influenza A Antigen NEEL 11/28/2022 Not Detected  Not Detected Final   • Influenza B Antigen NEEL 11/28/2022 Not Detected  Not Detected Final   • Internal Control 11/28/2022 Passed  Passed Final   • Lot Number 11/28/2022 [id number]   Final   • Expiration Date 11/28/2022 8/25/23   Final                        Assessment and Plan  "     Diagnoses and all orders for this visit:    1. Influenza B (Primary)  -     ondansetron (Zofran) 4 MG tablet; Take 1 tablet by mouth Every 8 (Eight) Hours As Needed for Nausea or Vomiting.  Dispense: 20 tablet; Refill: 0    2. Cough, unspecified type  -     POCT SARS-CoV-2 Antigen NEEL + Flu  -     POCT rapid strep A  -     Beta Strep Culture, Throat - , Throat; Future  -     Beta Strep Culture, Throat - Swab, Throat            Follow Up     Return if symptoms worsen or fail to improve.

## 2023-02-13 ENCOUNTER — OFFICE VISIT (OUTPATIENT)
Dept: FAMILY MEDICINE CLINIC | Age: 33
End: 2023-02-13
Payer: COMMERCIAL

## 2023-02-13 VITALS
TEMPERATURE: 98 F | HEIGHT: 65 IN | HEART RATE: 89 BPM | OXYGEN SATURATION: 100 % | WEIGHT: 293 LBS | BODY MASS INDEX: 48.82 KG/M2 | DIASTOLIC BLOOD PRESSURE: 80 MMHG | SYSTOLIC BLOOD PRESSURE: 124 MMHG

## 2023-02-13 DIAGNOSIS — Z20.822 EXPOSURE TO COVID-19 VIRUS: Primary | ICD-10-CM

## 2023-02-13 LAB
EXPIRATION DATE: NORMAL
INTERNAL CONTROL: NORMAL
Lab: NORMAL
SARS-COV-2 AG UPPER RESP QL IA.RAPID: NOT DETECTED

## 2023-02-13 PROCEDURE — 99213 OFFICE O/P EST LOW 20 MIN: CPT | Performed by: NURSE PRACTITIONER

## 2023-02-13 PROCEDURE — 87426 SARSCOV CORONAVIRUS AG IA: CPT | Performed by: NURSE PRACTITIONER

## 2023-02-13 NOTE — PROGRESS NOTES
"Chief Complaint  Headache (Exposed to covid)    Subjective          Raven Eckert presents to [facility] complaining of headache times 1 day.  Patient was exposed to COVID for the past 2 days.  She has taken  Tylenol/ibuprofen at home to help with symptoms.  Denies fever, chills, nausea and vomiting, diarrhea, shortness of air or chest pain. She was also dx with Flu B last week. She states she started feeling better before COVID exposure.       Objective   Vital Signs:   Vitals:    02/13/23 1126   BP: 124/80   BP Location: Right arm   Patient Position: Sitting   Cuff Size: Large Adult   Pulse: 89   Temp: 98 °F (36.7 °C)   TempSrc: Oral   SpO2: 100%   Weight: (!) 142 kg (312 lb)   Height: 165.1 cm (65\")       Physical Exam  Vitals reviewed.   Constitutional:       General: She is not in acute distress.     Appearance: She is well-developed. She is ill-appearing.   HENT:      Head: Normocephalic and atraumatic.   Eyes:      Conjunctiva/sclera: Conjunctivae normal.      Pupils: Pupils are equal, round, and reactive to light.   Cardiovascular:      Rate and Rhythm: Normal rate and regular rhythm.      Heart sounds: Normal heart sounds. No murmur heard.  Pulmonary:      Effort: Pulmonary effort is normal. No respiratory distress.      Breath sounds: Normal breath sounds.   Skin:     General: Skin is warm and dry.   Neurological:      Mental Status: She is alert and oriented to person, place, and time.   Psychiatric:         Mood and Affect: Mood and affect normal.         Behavior: Behavior normal.         Thought Content: Thought content normal.         Judgment: Judgment normal.          Result Review :              Assessment and Plan    Diagnoses and all orders for this visit:    1. Exposure to COVID-19 virus (Primary)  Comments:  neg today. Work note provided until wed. If still not feeling well, pt to call office & can come in for another rapid. If symptoms changed, she " needs to be seen  Orders:  -     POCT SARS-CoV-2 Antigen NEEL    Patient to notify office with any acute concerns or issues.  Patient verbalizes understanding, agrees with plan of care and has no further questions upon discharge.    Please note that portions of this note were completed with a voice recognition program.    Follow Up    Return if symptoms worsen or fail to improve.  Patient was given instructions and counseling regarding her condition or for health maintenance advice. Please see specific information pulled into the AVS if appropriate.

## 2023-04-07 ENCOUNTER — OFFICE VISIT (OUTPATIENT)
Dept: FAMILY MEDICINE CLINIC | Facility: CLINIC | Age: 33
End: 2023-04-07
Payer: COMMERCIAL

## 2023-04-07 ENCOUNTER — TELEPHONE (OUTPATIENT)
Dept: FAMILY MEDICINE CLINIC | Facility: CLINIC | Age: 33
End: 2023-04-07

## 2023-04-07 VITALS
RESPIRATION RATE: 14 BRPM | HEART RATE: 87 BPM | DIASTOLIC BLOOD PRESSURE: 95 MMHG | TEMPERATURE: 98.4 F | WEIGHT: 293 LBS | BODY MASS INDEX: 47.09 KG/M2 | HEIGHT: 66 IN | SYSTOLIC BLOOD PRESSURE: 156 MMHG | OXYGEN SATURATION: 99 %

## 2023-04-07 DIAGNOSIS — F31.4 BIPOLAR DISORDER, CURRENT EPISODE DEPRESSED, SEVERE, WITHOUT PSYCHOTIC FEATURES: Chronic | ICD-10-CM

## 2023-04-07 DIAGNOSIS — E66.01 CLASS 3 SEVERE OBESITY DUE TO EXCESS CALORIES WITH SERIOUS COMORBIDITY AND BODY MASS INDEX (BMI) OF 50.0 TO 59.9 IN ADULT: Chronic | ICD-10-CM

## 2023-04-07 DIAGNOSIS — Z79.899 MEDICATION MANAGEMENT: ICD-10-CM

## 2023-04-07 DIAGNOSIS — K59.00 CONSTIPATION, UNSPECIFIED CONSTIPATION TYPE: ICD-10-CM

## 2023-04-07 DIAGNOSIS — F50.81 BINGE EATING DISORDER: Chronic | ICD-10-CM

## 2023-04-07 DIAGNOSIS — F90.2 ATTENTION DEFICIT HYPERACTIVITY DISORDER (ADHD), COMBINED TYPE: Primary | Chronic | ICD-10-CM

## 2023-04-07 PROBLEM — F50.819 BINGE EATING DISORDER: Chronic | Status: ACTIVE | Noted: 2023-04-07

## 2023-04-07 PROBLEM — R30.0 DYSURIA: Chronic | Status: RESOLVED | Noted: 2022-03-11 | Resolved: 2023-04-07

## 2023-04-07 LAB
POC AMPHETAMINES: NEGATIVE
POC BARBITURATES: NEGATIVE
POC BENZODIAZEPHINES: NEGATIVE
POC COCAINE: NEGATIVE
POC METHADONE: NEGATIVE
POC METHAMPHETAMINE SCREEN URINE: NEGATIVE
POC OPIATES: NEGATIVE
POC OXYCODONE: NEGATIVE
POC PHENCYCLIDINE: NEGATIVE
POC PROPOXYPHENE: NEGATIVE
POC THC: NEGATIVE
POC TRICYCLIC ANTIDEPRESSANTS: NEGATIVE

## 2023-04-07 RX ORDER — PRUCALOPRIDE 2 MG/1
1 TABLET, FILM COATED ORAL DAILY
Qty: 14 TABLET | Refills: 0 | COMMUNITY
Start: 2023-04-07

## 2023-04-07 NOTE — ASSESSMENT & PLAN NOTE
Patient's (Body mass index is 50.13 kg/m².) indicates that they are morbidly/severely obese (BMI > 40 or > 35 with obesity - related health condition) with health conditions that include hypertension . Weight is improving with lifestyle modifications. BMI  is above average; BMI management plan is completed. We discussed low calorie, low carb based diet program, portion control and increasing exercise.

## 2023-04-07 NOTE — PROGRESS NOTES
"Chief Complaint  Binge eating disorder and constipation.     Subjective        Raven Eckert presents to [facility]  History of Present Illness  She is here today for a follow-up for her chronic medical conditions.  She has 2 children.  She is a smoker.  She has a past medical history significant for arthritis, tobacco abuse, diabetes, bipolar, hypoglycemia, muscle cramps, chronic low back pain with history of back surgery and seasonal allergies.     She had an MRI of her lumbar spine reveling for L5-S1 foraminal stenosis in the past.     She has been diagnosed and treated for ADHD in the past. She is asking for medication to help treat her symptoms today. She is not on any controlled medication and denies drug abuse in the past.       The patient has no other complaints today and denies chest pain, shortness of breath, weakness, nausea, vomiting, diarrhea, dizziness or syncopal event.      Objective   Vital Signs:  /95   Pulse 87   Temp 98.4 °F (36.9 °C)   Resp 14   Ht 167.6 cm (66\")   Wt (!) 141 kg (310 lb 9.6 oz)   SpO2 99%   BMI 50.13 kg/m²   Estimated body mass index is 50.13 kg/m² as calculated from the following:    Height as of this encounter: 167.6 cm (66\").    Weight as of this encounter: 141 kg (310 lb 9.6 oz).             Physical Exam  Vitals reviewed.   Constitutional:       Appearance: Normal appearance. She is well-developed. She is morbidly obese.   HENT:      Head: Normocephalic and atraumatic.      Right Ear: External ear normal.      Left Ear: External ear normal.      Mouth/Throat:      Pharynx: No oropharyngeal exudate.   Eyes:      Conjunctiva/sclera: Conjunctivae normal.      Pupils: Pupils are equal, round, and reactive to light.   Neck:      Vascular: No carotid bruit.   Cardiovascular:      Rate and Rhythm: Normal rate and regular rhythm.      Heart sounds: No murmur heard.    No friction rub. No gallop.   Pulmonary:      Effort: " Pulmonary effort is normal.      Breath sounds: Normal breath sounds. No wheezing or rhonchi.   Abdominal:      General: There is no distension.   Skin:     General: Skin is warm and dry.   Neurological:      Mental Status: She is alert and oriented to person, place, and time.      Cranial Nerves: No cranial nerve deficit.      Motor: No weakness.   Psychiatric:         Mood and Affect: Mood and affect normal.         Behavior: Behavior normal.         Thought Content: Thought content normal.         Judgment: Judgment normal.        Result Review :                                 Assessment and Plan   Diagnoses and all orders for this visit:    1. Attention deficit hyperactivity disorder (ADHD), combined type (Primary)  Assessment & Plan:  Psychological condition is worsening.  Medication changes per orders.  Psychological condition  will be reassessed in 3 months.    -     lisdexamfetamine (Vyvanse) 30 MG capsule; Take 1 capsule by mouth Every Morning  Dispense: 30 capsule; Refill: 0    2. Constipation, unspecified constipation type  -     Prucalopride Succinate (Motegrity) 2 MG tablet; Take 1 tablet by mouth Daily.  Dispense: 14 tablet; Refill: 0    3. Class 3 severe obesity due to excess calories with serious comorbidity and body mass index (BMI) of 50.0 to 59.9 in adult  Assessment & Plan:  Patient's (Body mass index is 50.13 kg/m².) indicates that they are morbidly/severely obese (BMI > 40 or > 35 with obesity - related health condition) with health conditions that include hypertension . Weight is improving with lifestyle modifications. BMI  is above average; BMI management plan is completed. We discussed low calorie, low carb based diet program, portion control and increasing exercise.       4. Bipolar disorder, current episode depressed, severe, without psychotic features    5. Binge eating disorder    -     lisdexamfetamine (Vyvanse) 30 MG capsule; Take 1 capsule by mouth Every Morning  Dispense: 30  capsule; Refill: 0    6. Medication management   -     POC Urine D rug Screen, Triage    7.  Elevated blood pressure reading without history of hypertension.            - Patient's blood pressure is elevated today because she seems to be nervous to me.  We will monitor blood pressure and moving forward to her  Appointment but still elevated we will possibly treat her for hypertension.         Follow Up   Return in about 3 months (around 7/7/2023).  Patient was given instructions and counseling regarding her condition or for health maintenance advice. Please see specific information pulled into the AVS if appropriate.

## 2023-04-28 ENCOUNTER — PATIENT ROUNDING (BHMG ONLY) (OUTPATIENT)
Dept: FAMILY MEDICINE CLINIC | Facility: CLINIC | Age: 33
End: 2023-04-28
Payer: COMMERCIAL

## 2023-04-28 NOTE — PROGRESS NOTES
A My-Chart message has been sent to the patient for PATIENT ROUNDING with [facility].

## 2023-05-01 ENCOUNTER — TELEMEDICINE (OUTPATIENT)
Dept: FAMILY MEDICINE CLINIC | Facility: CLINIC | Age: 33
End: 2023-05-01
Payer: COMMERCIAL

## 2023-05-01 VITALS — SYSTOLIC BLOOD PRESSURE: 148 MMHG | DIASTOLIC BLOOD PRESSURE: 100 MMHG

## 2023-05-01 DIAGNOSIS — F90.2 ATTENTION DEFICIT HYPERACTIVITY DISORDER (ADHD), COMBINED TYPE: Primary | Chronic | ICD-10-CM

## 2023-05-01 DIAGNOSIS — F17.219 CIGARETTE NICOTINE DEPENDENCE WITH NICOTINE-INDUCED DISORDER: Chronic | ICD-10-CM

## 2023-05-01 DIAGNOSIS — I10 PRIMARY HYPERTENSION: Chronic | ICD-10-CM

## 2023-05-01 DIAGNOSIS — E66.01 CLASS 3 SEVERE OBESITY DUE TO EXCESS CALORIES WITH SERIOUS COMORBIDITY AND BODY MASS INDEX (BMI) OF 50.0 TO 59.9 IN ADULT: Chronic | ICD-10-CM

## 2023-05-01 RX ORDER — LISINOPRIL 20 MG/1
20 TABLET ORAL DAILY
Qty: 30 TABLET | Refills: 5 | Status: SHIPPED | OUTPATIENT
Start: 2023-05-01

## 2023-05-01 RX ORDER — DEXMETHYLPHENIDATE HYDROCHLORIDE 10 MG/1
10 CAPSULE, EXTENDED RELEASE ORAL DAILY
Qty: 30 CAPSULE | Refills: 0 | Status: SHIPPED | OUTPATIENT
Start: 2023-05-01

## 2023-05-01 RX ORDER — LISINOPRIL 10 MG/1
10 TABLET ORAL DAILY
COMMUNITY
End: 2023-05-01 | Stop reason: SDUPTHER

## 2023-05-01 NOTE — ASSESSMENT & PLAN NOTE
Patient's (There is no height or weight on file to calculate BMI.) indicates that they are morbidly/severely obese (BMI > 40 or > 35 with obesity - related health condition) with health conditions that include hypertension and dyslipidemias . Weight is unchanged. BMI  is above average; BMI management plan is completed. We discussed low calorie, low carb based diet program, portion control and increasing exercise.

## 2023-05-01 NOTE — ASSESSMENT & PLAN NOTE
Hypertension is worsening.  Dietary sodium restriction.  Weight loss.  Stop smoking.  Medication changes per orders.  Blood pressure will be reassessed at the next regular appointment.

## 2023-05-01 NOTE — PROGRESS NOTES
"Chief Complaint  Medication Problem (Vyvanse causing headaches , would like to discuss Focalin )    Subjective        Raven Eckert presents to [facility]  History of Present Illness  She is being managed today from her car at work for a televisit. She is being managed for a follow-up for her chronic medical conditions. She agreed to do a telehealth visit. The visit is originating from the clinic today. She has 2 children.  She is a smoker.  She has arthritis, tobacco abuse, diabetes, bipolar, hypoglycemia, muscle cramps, chronic low back pain with history of back surgery and seasonal allergies.     She had an MRI of her lumbar spine reveling for L5-S1 foraminal stenosis in the past.      She is having side effect from her Vyvanse. She is having headaches later in the day after taking it. She is wanting to try switching to Focalin today.     The patient has no other complaints today and denies chest pain, shortness of breath, weakness, nausea, vomiting, diarrhea, dizziness or syncopal event.      Objective   Vital Signs:  /100 Comment: patient reported taken at 8:00  Estimated body mass index is 50.13 kg/m² as calculated from the following:    Height as of 4/7/23: 167.6 cm (66\").    Weight as of 4/7/23: 141 kg (310 lb 9.6 oz).             Physical Exam  Constitutional:       Appearance: She is well-developed. She is morbidly obese.   HENT:      Head: Normocephalic and atraumatic.      Right Ear: External ear normal.      Left Ear: External ear normal.      Mouth/Throat:      Pharynx: No oropharyngeal exudate.   Eyes:      Conjunctiva/sclera: Conjunctivae normal.      Pupils: Pupils are equal, round, and reactive to light.   Abdominal:      General: There is no distension.   Neurological:      Mental Status: She is alert and oriented to person, place, and time.      Cranial Nerves: No cranial nerve deficit.      Motor: No weakness.   Psychiatric:         Mood and " Affect: Mood and affect normal.         Behavior: Behavior normal.         Thought Content: Thought content normal.         Judgment: Judgment normal.        Result Review :                                 Assessment and Plan   Diagnoses and all orders for this visit:    1. Attention deficit hyperactivity disorder (ADHD), combined type (Primary)  Assessment & Plan:  Psychological condition is unchanged.  Medication changes per orders.              Psychological condition  will be reassessed 1 month.     -     dexmethylphenidate XR (Focalin XR) 10 MG 24 hr capsule; Take 1 capsule by mouth Daily  Dispense: 30 capsule; Refill: 0    2. Cigarette nicotine dependence with nicotine-induced disorder  Assessment & Plan:  Tobacco use is unchanged.  Smoking cessation counseling was provided.  Tobacco use will be reassessed at the next regular appointment.      3. Class 3 severe obesity due to excess calories with serious comorbidity and body mass index (BMI) of 50.0 to 59.9 in adult  Assessment & Plan:  Patient's (There is no height or weight on file to calculate BMI.) indicates that they are morbidly/severely obese (BMI > 40 or > 35 with obesity - related health condition) with health conditions that include hypertension and dyslipidemias . Weight is unchanged. BMI  is above average; BMI management plan is completed. We discussed low calorie, low carb based diet program, portion control and increasing exercise.       4. Primary hypertension  Assessment & Plan:  Hypertension is worsening.  Dietary sodium restriction.  Weight loss.  Stop smoking.  Medication changes per orders.  Blood pressure will be reassessed at the next regular appointment.    -     lisinopril (PRINIVIL,ZESTRIL) 20 MG tablet; Take 1 tablet by mouth Daily.  Dispense: 30 tablet; Refill: 5         I spent 20 minutes caring for Raven on this date of service. This time includes time spent by me in the following activities:preparing for the visit, reviewing  tests, obtaining and/or reviewing a separately obtained history, performing a medically appropriate examination and/or evaluation , counseling and educating the patient/family/caregiver, ordering medications, tests, or procedures and documenting information in the medical record  Follow Up   Return in about 1 month (around 6/1/2023).  Patient was given instructions and counseling regarding her condition or for health maintenance advice. Please see specific information pulled into the AVS if appropriate.

## 2023-05-01 NOTE — ASSESSMENT & PLAN NOTE
Psychological condition is unchanged.  Medication changes per orders.  Psychological condition  will be reassessed 1 month.

## 2023-05-10 ENCOUNTER — TELEPHONE (OUTPATIENT)
Dept: FAMILY MEDICINE CLINIC | Facility: CLINIC | Age: 33
End: 2023-05-10
Payer: COMMERCIAL

## 2023-05-10 NOTE — TELEPHONE ENCOUNTER
Patient called stating her Focalin doesn't seem to be working. She is not having any side effects but she doesn't feel any difference when taking the medication. She is wondering if the dosage should be increased. Spoke with Dr. Carver who suggested patient increase to 2 tablets a day for 20mg dosage and then follow up in 10 days to see how everything is going. Scheduled appointment for 05/19 for patient and let her know his recommendation

## 2023-05-19 ENCOUNTER — TELEMEDICINE (OUTPATIENT)
Dept: FAMILY MEDICINE CLINIC | Facility: CLINIC | Age: 33
End: 2023-05-19

## 2023-05-19 DIAGNOSIS — F17.219 CIGARETTE NICOTINE DEPENDENCE WITH NICOTINE-INDUCED DISORDER: Chronic | ICD-10-CM

## 2023-05-19 DIAGNOSIS — F90.2 ATTENTION DEFICIT HYPERACTIVITY DISORDER (ADHD), COMBINED TYPE: Primary | Chronic | ICD-10-CM

## 2023-05-19 DIAGNOSIS — E66.01 CLASS 3 SEVERE OBESITY DUE TO EXCESS CALORIES WITH SERIOUS COMORBIDITY AND BODY MASS INDEX (BMI) OF 50.0 TO 59.9 IN ADULT: Chronic | ICD-10-CM

## 2023-05-19 DIAGNOSIS — I10 PRIMARY HYPERTENSION: Chronic | ICD-10-CM

## 2023-05-19 RX ORDER — DEXMETHYLPHENIDATE HYDROCHLORIDE 10 MG/1
CAPSULE, EXTENDED RELEASE ORAL
Qty: 90 CAPSULE | Refills: 0 | Status: SHIPPED | OUTPATIENT
Start: 2023-05-19 | End: 2023-05-24 | Stop reason: DRUGHIGH

## 2023-05-19 NOTE — PROGRESS NOTES
"Chief Complaint  ADHD (Medication follow up  feels like her medication is wearing off by lunch time early after noon even after doubling up on dose and would like to see about taking a additional 20mg in the afternoon. )    Subjective        Raven Eckert presents to [facility]  History of Present Illness  She is being managed today from her car at work for a televisit. She is being managed for a follow-up for her chronic medical conditions. She agreed to do a telehealth visit. The visit is originating from the clinic today. She has 2 children.  She is a smoker.  She has arthritis, tobacco abuse, diabetes, bipolar, hypoglycemia, muscle cramps, chronic low back pain with history of back surgery and seasonal allergies.     She had an MRI of her lumbar spine reveling for L5-S1 foraminal stenosis in the past.      She is having significant improvement in her attention issues with Focalin 20 mg XR daily. She does believe it is running out before the day is finished.      The patient has no other complaints today and denies chest pain, shortness of breath, weakness, nausea, vomiting, diarrhea, dizziness or syncopal event.         Objective   Vital Signs:  There were no vitals taken for this visit.  Estimated body mass index is 50.13 kg/m² as calculated from the following:    Height as of 4/7/23: 167.6 cm (66\").    Weight as of 4/7/23: 141 kg (310 lb 9.6 oz).             Physical Exam  Constitutional:       Appearance: She is well-developed. She is morbidly obese.   HENT:      Head: Normocephalic and atraumatic.      Right Ear: External ear normal.      Left Ear: External ear normal.      Mouth/Throat:      Pharynx: No oropharyngeal exudate.   Eyes:      Conjunctiva/sclera: Conjunctivae normal.      Pupils: Pupils are equal, round, and reactive to light.   Pulmonary:      Effort: Pulmonary effort is normal.   Abdominal:      General: There is no distension.   Skin:     General: " Skin is dry.   Neurological:      Mental Status: She is alert and oriented to person, place, and time.      Cranial Nerves: No cranial nerve deficit.   Psychiatric:         Mood and Affect: Mood and affect normal.         Behavior: Behavior normal.         Thought Content: Thought content normal.         Judgment: Judgment normal.        Result Review :                                 Assessment and Plan   Diagnoses and all orders for this visit:    1. Attention deficit hyperactivity disorder (ADHD), combined type (Primary)  Assessment & Plan:  Psychological condition is improving with treatment.  Medication changes per orders.  Psychological condition  will be reassessed in 4 weeks.                        - dexmethylphenidate XR (Focalin XR) 10 MG 24 hr capsule; Take 2 tablets in am and 1 tablet at noon daily  Dispense: 90 capsule; Refill: 0    2. Class 3 severe obesity due to excess calories with serious comorbidity and body mass index (BMI) of 50.0 to 59.9 in adult  Assessment & Plan:  Patient's (There is no height or weight on file to calculate BMI.) indicates that they are morbidly/severely obese (BMI > 40 or > 35 with obesity - related health condition) with health conditions that include hypertension . Weight is improving with treatment. BMI  is above average; BMI management plan is completed. We discussed low calorie, low carb based diet program, portion control and increasing exercise.       3. Cigarette nicotine dependence with nicotine-induced disorder  Assessment & Plan:  Tobacco use is unchanged.  Smoking cessation counseling was provided.  Tobacco use will be reassessed at the next regular appointment.      4. Primary hypertension  Assessment & Plan:  Hypertension is improving with treatment.  Continue current treatment regimen.  Dietary sodium restriction.  Weight loss.  Blood pressure will be reassessed at the next regular appointment.           I spent 21 minutes caring for Raven on this date of  service. This time includes time spent by me in the following activities:preparing for the visit, reviewing tests, obtaining and/or reviewing a separately obtained history, performing a medically appropriate examination and/or evaluation , counseling and educating the patient/family/caregiver, ordering medications, tests, or procedures and documenting information in the medical record  Follow Up   No follow-ups on file.  Patient was given instructions and counseling regarding her condition or for health maintenance advice. Please see specific information pulled into the AVS if appropriate.

## 2023-05-22 ENCOUNTER — TELEPHONE (OUTPATIENT)
Dept: FAMILY MEDICINE CLINIC | Facility: CLINIC | Age: 33
End: 2023-05-22

## 2023-05-22 RX ORDER — DEXMETHYLPHENIDATE HYDROCHLORIDE 10 MG/1
CAPSULE, EXTENDED RELEASE ORAL
Qty: 90 CAPSULE | Refills: 0 | Status: CANCELLED | OUTPATIENT
Start: 2023-05-22

## 2023-05-22 NOTE — TELEPHONE ENCOUNTER
Caller: Raven Eckert    Relationship: Self    Best call back number: [phone]    Requested Prescriptions:   Requested Prescriptions     Pending Prescriptions Disp Refills   • dexmethylphenidate XR (Focalin XR) 10 MG 24 hr capsule 90 capsule 0     Sig: Take 2 tablets in am and 1 tablet at noon daily        [place] where [place] [facility] [place] [address] [facility] [phone] [facility] [phone] FX     Last office visit with prescribing clinician: 4/7/2023   Last telemedicine visit with prescribing clinician: 5/10/2023   Next office visit with prescribing clinician: 7/11/2023     Additional details provided by patient: PATIENT STATES THE PRIOR AUTHORIZATION THAT WAS SENT ON Friday AND IT WAS APPROVED BY THE INSURANCE COMPANY BUT IT STILL CANNOT BE FILLED BECAUSE THEY WONT APPROVE 3 EXTENDED RELEASE TABLETS PER DAY.     PATIENT STATES THE PHARMACY TOLD HER THAT IF THE SCRIPT IS WRITTEN FOR THE 2 MEDICATIONS AS FOLLOWS SHE WILL NOT HAVE TO HAVE A PRIOR AUTHORIZATION.    2 EXTENDED RELEASE TABLETS IN THE MORNING AND 10 MG REG RELEASE IN THE AFTERNOON     PATIENT IS GOING INTO WORK AND MAY NOT BE ABLE TO ANSWER THE PHONE BUT ASKS FOR A CALL BACK TO ADVISE HOW THIS CAN AND WILL BE DONE.     PATIENT AT THIS POINT STATES SHE IS FRUSTRATED AND ASKIN FOR THE 2 MEDICATIONS THE 10MG AND THE EXTENDED RELEASE.       Does the patient have less than a 3 day supply:  [x] Yes  [] No    Would you like a call back once the refill request has been completed: [x] Yes [] No    If the office needs to give you a call back, can they leave a voicemail: [x] Yes [] No    Shaneka Winters, PCT   05/22/23 08:20 EDT

## 2023-05-23 NOTE — TELEPHONE ENCOUNTER
Patient called saying pharmacy can not fill medication due to it having 3 extended release tablets. They told her she can do 2 extended release for the morning but the afternoon tablet would exceed the limit for extended release      I spoke with pharmacy, they stated even though the PA is on there, it wont release that many tablets for the day. We can try to do a 20mg XR tablet for morning and a 10mg XR tablet for afternoon OR the 2 XR tablets in morning and short acting dose for afternoon    Patient paid out of pocket for enough medication to last until Friday

## 2023-05-24 RX ORDER — DEXMETHYLPHENIDATE HYDROCHLORIDE 10 MG/1
TABLET ORAL
Qty: 30 TABLET | Refills: 0 | Status: SHIPPED | OUTPATIENT
Start: 2023-05-24 | End: 2023-06-12 | Stop reason: SDUPTHER

## 2023-05-24 RX ORDER — DEXMETHYLPHENIDATE HYDROCHLORIDE 20 MG/1
CAPSULE, EXTENDED RELEASE ORAL
Qty: 60 CAPSULE | Refills: 0 | Status: SHIPPED | OUTPATIENT
Start: 2023-05-24 | End: 2023-06-12 | Stop reason: SDUPTHER

## 2023-05-30 ENCOUNTER — TELEPHONE (OUTPATIENT)
Dept: FAMILY MEDICINE CLINIC | Facility: CLINIC | Age: 33
End: 2023-05-30

## 2023-05-31 NOTE — TELEPHONE ENCOUNTER
Can you please schedule patient for around 6/19/23?  Dr. Carver needed to see her 1 month after her last visit.  Current appt is 7/11/23.

## 2023-05-31 NOTE — ASSESSMENT & PLAN NOTE
Patient's (There is no height or weight on file to calculate BMI.) indicates that they are morbidly/severely obese (BMI > 40 or > 35 with obesity - related health condition) with health conditions that include hypertension . Weight is improving with treatment. BMI  is above average; BMI management plan is completed. We discussed low calorie, low carb based diet program, portion control and increasing exercise.

## 2023-06-05 ENCOUNTER — TELEPHONE (OUTPATIENT)
Dept: FAMILY MEDICINE CLINIC | Facility: CLINIC | Age: 33
End: 2023-06-05
Payer: COMMERCIAL

## 2023-06-05 NOTE — TELEPHONE ENCOUNTER
Mali from [facility] called to let us know patient received #25 pills of focalin 20mg ER on 5/26/23.  That is all they had in stock and this med is now on back order.  The patients insurance will not allow her to fill the remaining 5 tablets on that rx.  Mali states patient is due for refill again on 6/20/23.  She is hoping the medication will be off back order by that time.

## 2023-06-12 RX ORDER — DEXMETHYLPHENIDATE HYDROCHLORIDE 10 MG/1
TABLET ORAL
Qty: 30 TABLET | Refills: 0 | Status: SHIPPED | OUTPATIENT
Start: 2023-06-12

## 2023-06-12 RX ORDER — DEXMETHYLPHENIDATE HYDROCHLORIDE 20 MG/1
CAPSULE, EXTENDED RELEASE ORAL
Qty: 60 CAPSULE | Refills: 0 | Status: SHIPPED | OUTPATIENT
Start: 2023-06-12

## 2023-06-12 NOTE — TELEPHONE ENCOUNTER
Caller: Babar Ravenruy VelizMelissa    Relationship: Self    Best call back number: [phone]     Requested Prescriptions:   Requested Prescriptions     Pending Prescriptions Disp Refills    dexmethylphenidate (Focalin) 10 MG tablet 30 tablet 0     Sig: Take 1 tablet at noon.    dexmethylphenidate XR (Focalin XR) 20 MG 24 hr capsule 60 capsule 0     Si capsule [facility]        Pharmacy where request should be sent: [address] [phone] [facility] [phone] FX     Last office visit with prescribing clinician: 2023   Last telemedicine visit with prescribing clinician: 2023   Next office visit with prescribing clinician: 2023     Additional details provided by patient: MORE THAN THREE SUPPLY ON HAND     Does the patient have less than a 3 day supply:  [] Yes  [x] No    Would you like a call back once the refill request has been completed: [x] Yes [] No    If the office needs to give you a call back, can they leave a voicemail: [x] Yes [] No    Noah Mckinley Rep   23 12:15 EDT

## 2023-06-19 ENCOUNTER — OFFICE VISIT (OUTPATIENT)
Dept: FAMILY MEDICINE CLINIC | Facility: CLINIC | Age: 33
End: 2023-06-19
Payer: COMMERCIAL

## 2023-06-19 VITALS
HEART RATE: 90 BPM | RESPIRATION RATE: 18 BRPM | WEIGHT: 293 LBS | BODY MASS INDEX: 47.09 KG/M2 | DIASTOLIC BLOOD PRESSURE: 75 MMHG | HEIGHT: 66 IN | OXYGEN SATURATION: 100 % | TEMPERATURE: 98.6 F | SYSTOLIC BLOOD PRESSURE: 120 MMHG

## 2023-06-19 DIAGNOSIS — Z51.81 ENCOUNTER FOR MEDICATION MONITORING: ICD-10-CM

## 2023-06-19 DIAGNOSIS — F90.2 ATTENTION DEFICIT HYPERACTIVITY DISORDER (ADHD), COMBINED TYPE: Primary | Chronic | ICD-10-CM

## 2023-06-19 DIAGNOSIS — E66.01 CLASS 3 SEVERE OBESITY DUE TO EXCESS CALORIES WITH SERIOUS COMORBIDITY AND BODY MASS INDEX (BMI) OF 50.0 TO 59.9 IN ADULT: Chronic | ICD-10-CM

## 2023-06-19 DIAGNOSIS — F17.219 CIGARETTE NICOTINE DEPENDENCE WITH NICOTINE-INDUCED DISORDER: Chronic | ICD-10-CM

## 2023-06-19 LAB
POC AMPHETAMINES: POSITIVE
POC BARBITURATES: NEGATIVE
POC BENZODIAZEPHINES: NEGATIVE
POC COCAINE: NEGATIVE
POC METHADONE: NEGATIVE
POC METHAMPHETAMINE SCREEN URINE: NEGATIVE
POC OPIATES: NEGATIVE
POC OXYCODONE: NEGATIVE
POC PHENCYCLIDINE: NEGATIVE
POC PROPOXYPHENE: NEGATIVE
POC THC: NEGATIVE
POC TRICYCLIC ANTIDEPRESSANTS: NEGATIVE

## 2023-06-19 PROCEDURE — 3074F SYST BP LT 130 MM HG: CPT | Performed by: FAMILY MEDICINE

## 2023-06-19 PROCEDURE — 1160F RVW MEDS BY RX/DR IN RCRD: CPT | Performed by: FAMILY MEDICINE

## 2023-06-19 PROCEDURE — 1159F MED LIST DOCD IN RCRD: CPT | Performed by: FAMILY MEDICINE

## 2023-06-19 PROCEDURE — 99214 OFFICE O/P EST MOD 30 MIN: CPT | Performed by: FAMILY MEDICINE

## 2023-06-19 PROCEDURE — 80305 DRUG TEST PRSMV DIR OPT OBS: CPT | Performed by: FAMILY MEDICINE

## 2023-06-19 PROCEDURE — 3078F DIAST BP <80 MM HG: CPT | Performed by: FAMILY MEDICINE

## 2023-06-19 NOTE — ASSESSMENT & PLAN NOTE
Patient's (Body mass index is 52.01 kg/m².) indicates that they are morbidly/severely obese (BMI > 40 or > 35 with obesity - related health condition) with health conditions that include hypertension and dyslipidemias . Weight is worsening. BMI  is above average; BMI management plan is completed. We discussed low calorie, low carb based diet program, portion control, and increasing exercise.

## 2023-06-19 NOTE — PROGRESS NOTES
"Chief Complaint  ADHD    Subjective        Raven Melissa Eckert presents to [facility]  History of Present Illness  She is here today for a follow-up for her chronic medical conditions.  She has 2 children.  She is a smoker.  She has a past medical history significant for arthritis, tobacco abuse, diabetes, bipolar, hypoglycemia, muscle cramps, chronic low back pain with history of back surgery and seasonal allergies.     She had an MRI of her lumbar spine reveling for L5-S1 foraminal stenosis in the past.      She says that her mood is doing well. Her attention is much better with focalin as prescribed.      The patient has no other complaints today and denies chest pain, shortness of breath, weakness, nausea, vomiting, diarrhea, dizziness or syncopal event.      Objective   Vital Signs:  /75   Pulse 90   Temp 98.6 °F (37 °C)   Resp 18   Ht 167.6 cm (65.98\")   Wt (!) 146 kg (322 lb 1.6 oz)   SpO2 100%   BMI 52.01 kg/m²   Estimated body mass index is 52.01 kg/m² as calculated from the following:    Height as of this encounter: 167.6 cm (65.98\").    Weight as of this encounter: 146 kg (322 lb 1.6 oz).               Physical Exam  Vitals reviewed.   Constitutional:       Appearance: She is well-developed. She is morbidly obese.   HENT:      Head: Normocephalic and atraumatic.      Right Ear: External ear normal.      Left Ear: External ear normal.      Mouth/Throat:      Pharynx: No oropharyngeal exudate.   Eyes:      Conjunctiva/sclera: Conjunctivae normal.      Pupils: Pupils are equal, round, and reactive to light.   Neck:      Vascular: No carotid bruit.   Cardiovascular:      Rate and Rhythm: Normal rate and regular rhythm.      Heart sounds: No murmur heard.    No friction rub. No gallop.   Pulmonary:      Effort: Pulmonary effort is normal.      Breath sounds: Normal breath sounds. No wheezing or rhonchi.   Abdominal:      General: There is no distension. "   Skin:     General: Skin is warm and dry.   Neurological:      Mental Status: She is alert and oriented to person, place, and time.      Cranial Nerves: No cranial nerve deficit.      Motor: No weakness.   Psychiatric:         Mood and Affect: Mood and affect normal.         Behavior: Behavior normal.         Thought Content: Thought content normal.         Judgment: Judgment normal.      Result Review :                         Assessment and Plan   Diagnoses and all orders for this visit:    1. Attention deficit hyperactivity disorder (ADHD), combined type (Primary)  Assessment & Plan:  Psychological condition is improving with treatment.  Continue current treatment regimen.  Regular aerobic exercise.  Psychological condition  will be reassessed at the next regular appointment.      2. Class 3 severe obesity due to excess calories with serious comorbidity and body mass index (BMI) of 50.0 to 59.9 in adult  Assessment & Plan:  Patient's (Body mass index is 52.01 kg/m².) indicates that they are morbidly/severely obese (BMI > 40 or > 35 with obesity - related health condition) with health conditions that include hypertension and dyslipidemias . Weight is worsening. BMI  is above average; BMI management plan is completed. We discussed low calorie, low carb based diet program, portion control, and increasing exercise.       3. Cigarette nicotine dependence with nicotine-induced disorder  Assessment & Plan:  Tobacco use is unchanged.  Smoking cessation counseling was provided.  Tobacco use will be reassessed at the next regular appointment.      4. Encounter for medication monitoring  -     POC Urine Drug Screen, Triage             Follow Up   Return in about 3 months (around 9/19/2023).  Patient was given instructions and counseling regarding her condition or for health maintenance advice. Please see specific information pulled into the AVS if appropriate.

## 2023-07-21 RX ORDER — DEXMETHYLPHENIDATE HYDROCHLORIDE 10 MG/1
TABLET ORAL
Qty: 30 TABLET | Refills: 0 | Status: SHIPPED | OUTPATIENT
Start: 2023-07-21

## 2023-07-25 RX ORDER — DEXMETHYLPHENIDATE HYDROCHLORIDE 20 MG/1
CAPSULE, EXTENDED RELEASE ORAL
Qty: 60 CAPSULE | Refills: 0 | Status: SHIPPED | OUTPATIENT
Start: 2023-07-25

## 2023-08-14 RX ORDER — DEXMETHYLPHENIDATE HYDROCHLORIDE 10 MG/1
TABLET ORAL
Qty: 30 TABLET | Refills: 0 | Status: SHIPPED | OUTPATIENT
Start: 2023-08-14

## 2023-09-21 RX ORDER — DEXMETHYLPHENIDATE HYDROCHLORIDE 10 MG/1
TABLET ORAL
Qty: 30 TABLET | Refills: 0 | Status: SHIPPED | OUTPATIENT
Start: 2023-09-21

## 2023-09-21 RX ORDER — DEXMETHYLPHENIDATE HYDROCHLORIDE 20 MG/1
CAPSULE, EXTENDED RELEASE ORAL
Qty: 60 CAPSULE | Refills: 0 | Status: SHIPPED | OUTPATIENT
Start: 2023-09-21

## 2023-09-21 RX ORDER — LISINOPRIL 20 MG/1
20 TABLET ORAL DAILY
Qty: 90 TABLET | Refills: 1 | Status: SHIPPED | OUTPATIENT
Start: 2023-09-21

## 2023-09-21 RX ORDER — LURASIDONE HYDROCHLORIDE 80 MG/1
80 TABLET, FILM COATED ORAL DAILY
Qty: 90 TABLET | Refills: 1 | Status: SHIPPED | OUTPATIENT
Start: 2023-09-21

## 2023-09-21 NOTE — TELEPHONE ENCOUNTER
Caller: Raven Eckert    Relationship: Self    Best call back number: [phone]     Requested Prescriptions:   Requested Prescriptions     Pending Prescriptions Disp Refills    dexmethylphenidate (FOCALIN) 10 MG tablet 30 tablet 0    dexmethylphenidate XR (FOCALIN XR) 20 MG 24 hr capsule 60 capsule 0     Sig: TAKE 1 CAPSULE BY MOUTH EVERY MORNING    Lurasidone HCl (LATUDA) 80 MG tablet tablet 90 tablet 1     Sig: Take 1 tablet by mouth Daily.    lisinopril (PRINIVIL,ZESTRIL) 20 MG tablet 30 tablet 5     Sig: Take 1 tablet by mouth Daily.        Pharmacy where request should be sent: [facility] [place] - [address] [facility] [phone] [facility] [phone]      Last office visit with prescribing clinician: 6/19/2023   Last telemedicine visit with prescribing clinician: 5/19/2023   Next office visit with prescribing clinician: Visit date not found     Additional details provided by patient: COMPLETELY OUT OF LURASIDONE    Does the patient have less than a 3 day supply:  [x] Yes  [] No    Would you like a call back once the refill request has been completed: [] Yes [] No    If the office needs to give you a call back, can they leave a voicemail: [] Yes [] No    Noah Cortez Rep   09/21/23 14:52 EDT

## 2023-12-07 ENCOUNTER — TELEPHONE (OUTPATIENT)
Dept: FAMILY MEDICINE CLINIC | Facility: CLINIC | Age: 33
End: 2023-12-07
Payer: COMMERCIAL

## 2023-12-28 RX ORDER — LURASIDONE HYDROCHLORIDE 80 MG/1
80 TABLET, FILM COATED ORAL DAILY
Qty: 90 TABLET | Refills: 1 | Status: SHIPPED | OUTPATIENT
Start: 2023-12-28 | End: 2023-12-28

## 2023-12-28 RX ORDER — DEXMETHYLPHENIDATE HYDROCHLORIDE 10 MG/1
TABLET ORAL
Qty: 30 TABLET | Refills: 0 | Status: SHIPPED | OUTPATIENT
Start: 2023-12-28

## 2023-12-28 RX ORDER — DEXMETHYLPHENIDATE HYDROCHLORIDE 10 MG/1
TABLET ORAL
Qty: 30 TABLET | Refills: 0 | Status: SHIPPED | OUTPATIENT
Start: 2023-12-28 | End: 2023-12-28 | Stop reason: SDUPTHER

## 2023-12-28 RX ORDER — LISINOPRIL 20 MG/1
20 TABLET ORAL DAILY
Qty: 90 TABLET | Refills: 1 | Status: SHIPPED | OUTPATIENT
Start: 2023-12-28

## 2023-12-28 RX ORDER — DEXMETHYLPHENIDATE HYDROCHLORIDE 20 MG/1
CAPSULE, EXTENDED RELEASE ORAL
Qty: 60 CAPSULE | Refills: 0 | Status: SHIPPED | OUTPATIENT
Start: 2023-12-28

## 2023-12-28 RX ORDER — DEXMETHYLPHENIDATE HYDROCHLORIDE 20 MG/1
CAPSULE, EXTENDED RELEASE ORAL
Qty: 60 CAPSULE | Refills: 0 | Status: SHIPPED | OUTPATIENT
Start: 2023-12-28 | End: 2023-12-28

## 2023-12-28 RX ORDER — LISINOPRIL 20 MG/1
20 TABLET ORAL DAILY
Qty: 90 TABLET | Refills: 1 | Status: SHIPPED | OUTPATIENT
Start: 2023-12-28 | End: 2023-12-28 | Stop reason: SDUPTHER

## 2023-12-28 RX ORDER — LURASIDONE HYDROCHLORIDE 80 MG/1
80 TABLET, FILM COATED ORAL DAILY
Qty: 90 TABLET | Refills: 1 | Status: SHIPPED | OUTPATIENT
Start: 2023-12-28

## 2023-12-28 NOTE — TELEPHONE ENCOUNTER
Patient called stating meds were sent to the wrong location.  [facility] pharmacy has already backed those out of their system.  Patient will only be using [facility] moving forward. She is wanting to pick these up tonight, her pharmacy closes at 6pm.

## 2024-01-18 ENCOUNTER — TELEPHONE (OUTPATIENT)
Dept: FAMILY MEDICINE CLINIC | Facility: CLINIC | Age: 34
End: 2024-01-18
Payer: COMMERCIAL

## 2024-01-18 NOTE — TELEPHONE ENCOUNTER
Caller: Raven Eckert    Relationship: Self    Best call back number: [phone]    What medication are you requesting: SOMETHING TO HELP STOP SMOKING    What are your current symptoms: SMOKING    How long have you been experiencing symptoms: SINCE 13 YEARS OLD    Have you had these symptoms before:    [x] Yes  [] No    Have you been treated for these symptoms before:   [x] Yes  [] No    If a prescription is needed, what is your preferred pharmacy and phone number:    [facility] - [place], [place] - [address] [place] [address] [phone] [facility] [phone]  [phone]          Additional notes:

## 2024-01-23 NOTE — TELEPHONE ENCOUNTER
Spoke with patient 01/23, she has switched PCP and had appointment today and they have prescribed medication to help stop smoking    Nothing further needed

## 2024-01-29 NOTE — TELEPHONE ENCOUNTER
Caller: Babar Ravenruy Torres    Relationship: Self    Best call back number: [phone]     Requested Prescriptions:   Requested Prescriptions     Pending Prescriptions Disp Refills    dexmethylphenidate (FOCALIN) 10 MG tablet 30 tablet 0     Sig: Take 1 tab at noon daily        Pharmacy where request should be sent: [facility] - [place] - [address] [facility] [phone] [facility] [phone] FX     Last office visit with prescribing clinician: 6/19/2023   Last telemedicine visit with prescribing clinician: Visit date not found   Next office visit with prescribing clinician: Visit date not found     Additional details provided by patient: PATIENT STATES HER CURRENT PCP CANNOT PRESCRIBE THIS MEDIATION AND SHE CANNOT GET IN TO SEE HER BEHAVIORAL HEALTH PCP FOR ANOTHER WEEK. PATIENT IS COMPLETELY OUT OF THIS MEDICATION.    PLEASE CALL AND ADVISE    Does the patient have less than a 3 day supply:  [x] Yes  [] No    Would you like a call back once the refill request has been completed: [] Yes [] No    If the office needs to give you a call back, can they leave a voicemail: [] Yes [] No    Naoh Cortez Rep   01/29/24 12:13 EST

## 2024-01-30 RX ORDER — DEXMETHYLPHENIDATE HYDROCHLORIDE 10 MG/1
TABLET ORAL
Qty: 30 TABLET | Refills: 0 | OUTPATIENT
Start: 2024-01-30

## 2024-05-15 ENCOUNTER — OFFICE VISIT (OUTPATIENT)
Dept: NEUROSURGERY | Facility: CLINIC | Age: 34
End: 2024-05-15
Payer: COMMERCIAL

## 2024-05-15 VITALS
DIASTOLIC BLOOD PRESSURE: 87 MMHG | HEART RATE: 99 BPM | HEIGHT: 66 IN | WEIGHT: 293 LBS | SYSTOLIC BLOOD PRESSURE: 137 MMHG | BODY MASS INDEX: 47.09 KG/M2

## 2024-05-15 DIAGNOSIS — M79.602 BILATERAL ARM PAIN: ICD-10-CM

## 2024-05-15 DIAGNOSIS — R20.2 POSITIVE TINEL'S SIGN: ICD-10-CM

## 2024-05-15 DIAGNOSIS — R20.2 NUMBNESS AND TINGLING IN LEFT ARM: ICD-10-CM

## 2024-05-15 DIAGNOSIS — M79.601 BILATERAL ARM PAIN: ICD-10-CM

## 2024-05-15 DIAGNOSIS — R20.0 NUMBNESS AND TINGLING OF RIGHT ARM: ICD-10-CM

## 2024-05-15 DIAGNOSIS — R29.898 WEAKNESS OF BOTH HANDS: ICD-10-CM

## 2024-05-15 DIAGNOSIS — M54.2 TENDERNESS OF NECK: ICD-10-CM

## 2024-05-15 DIAGNOSIS — R20.0 NUMBNESS AND TINGLING IN LEFT ARM: ICD-10-CM

## 2024-05-15 DIAGNOSIS — M51.36 DDD (DEGENERATIVE DISC DISEASE), LUMBAR: Primary | ICD-10-CM

## 2024-05-15 DIAGNOSIS — R20.2 NUMBNESS AND TINGLING OF RIGHT ARM: ICD-10-CM

## 2024-05-15 DIAGNOSIS — M47.816 LUMBAR FACET ARTHROPATHY: ICD-10-CM

## 2024-05-15 PROCEDURE — 1160F RVW MEDS BY RX/DR IN RCRD: CPT | Performed by: PHYSICIAN ASSISTANT

## 2024-05-15 PROCEDURE — 99213 OFFICE O/P EST LOW 20 MIN: CPT | Performed by: PHYSICIAN ASSISTANT

## 2024-05-15 PROCEDURE — 1159F MED LIST DOCD IN RCRD: CPT | Performed by: PHYSICIAN ASSISTANT

## 2024-05-15 PROCEDURE — 3079F DIAST BP 80-89 MM HG: CPT | Performed by: PHYSICIAN ASSISTANT

## 2024-05-15 PROCEDURE — 3075F SYST BP GE 130 - 139MM HG: CPT | Performed by: PHYSICIAN ASSISTANT

## 2024-05-15 RX ORDER — CARIPRAZINE 3 MG/1
CAPSULE, GELATIN COATED ORAL
COMMUNITY
Start: 2024-04-10

## 2024-05-15 NOTE — PROGRESS NOTES
Patient being seen for today for Back Pain, Follow-up, and Numbness (Bilateral arms, right)  .    Subjective    Raven Eckert is a 33 y.o. female that presents with Back Pain, Follow-up, and Numbness (Bilateral arms, right)  .    HPI  Previously: Last seen on 2/8/2022 for complaints of worsening pain in the legs extending into the thighs and sometimes to the calves and sides of the feet.  At that time she had a recent MRI showing multilevel spondylosis and facet arthritis, previous laminectomy at L5-S1 on the left, mild foraminal narrowing bilaterally at L5-S1 without significant central canal or foraminal stenosis otherwise.    Today: She reports minimal pain in the lower back.    Her main complaint today is pain with numbness and tingling in both arms to the 2nd and 3rd digits. Numbness is worse in the left 4th digit. She has pain in the left hand with clenching the fist. Laying down seems to worsen her complaints as well.    Nothing seems to improve her symptoms. She denies any specific treatment outside of heating pad and ice.    She reports subjective weakness in the arms. She denies pain in the neck     reports that she has been smoking cigarettes. She started smoking about 11 years ago. She has a 5.7 pack-year smoking history. She has never used smokeless tobacco.    Review of Systems   Musculoskeletal:  Negative for neck pain.   Neurological:  Positive for weakness and numbness.       Objective   Vitals:    05/15/24 1018   BP: 137/87   Pulse: 99        Physical Exam  Constitutional:       Appearance: Normal appearance. She is obese.   Pulmonary:      Effort: Pulmonary effort is normal.   Musculoskeletal:         General: Tenderness (midline and right cervical area, right shoulder joint) present.      Comments: Devries's negative bilaterally,  Clonus negative bilaterally,  Tinel's test positive at the right wrist and elbow,  No worsened pain in right shoulder with movement   Neurological:       General: No focal deficit present.      Mental Status: She is alert and oriented to person, place, and time.      Sensory: No sensory deficit.      Motor: Weakness (generally in both hands ) present.      Deep Tendon Reflexes: Reflexes normal.   Psychiatric:         Mood and Affect: Mood normal.         Behavior: Behavior normal.          Result Review   I have personally reviewed the radiology report for MRI of the lumbar spine without contrast from 1/13/2022 which shows mild multilevel spondylosis and facet arthritis at L4-L5 and L5-S1.  Evidence of prior left-sided laminotomy at L5-S1.  Mild bilateral foraminal narrowing L5-S1.     Assessment and Plan {CC Problem List  Visit Diagnosis  ROS  Review (Popup)  [facility]  Quality  BestPractice  Medications  SmartSets  SnapShot Encounters  Media :23}   Diagnoses and all orders for this visit:    1. DDD (degenerative disc disease), lumbar (Primary)    2. Lumbar facet arthropathy    3. Tenderness of neck  -     XR Spine Cervical Complete 4 or 5 View; Future    4. Bilateral arm pain  -     XR Spine Cervical Complete 4 or 5 View; Future    5. Numbness and tingling in left arm    6. Numbness and tingling of right arm    7. Weakness of both hands  -     EMG & Nerve Conduction Test; Future    8. Positive Tinel's sign  -     EMG & Nerve Conduction Test; Future    She complaints of pain in both arms with numbness and tingling worse in the right arm to the middle fingers of the hand.    She has general weakness in both hands on exam.    Tinel's testing was positive at the right wrist and elbow.    There were signs of myelopathy on examination.    She has no imaging of the cervical spine. I am more suspicious of the peripheral nerve involvement with the weakness in the hands and positive tinel's testing.    I will order NCV/EMG for bilateral upper extremities to assess for underlying median or ulnar nerve neuropathy.    I will also order an x-ray of the  cervical spine as an initial evaluation of tenderness in the neck.    She will follow-up here in 4 weeks after nerve testing to reassess.    Follow Up {Instructions Charge Capture  Follow-up Communications :23}   Return in about 4 weeks (around 6/12/2024).

## 2024-06-25 ENCOUNTER — PATIENT MESSAGE (OUTPATIENT)
Dept: NEUROSURGERY | Facility: CLINIC | Age: 34
End: 2024-06-25
Payer: COMMERCIAL

## 2024-06-26 ENCOUNTER — HOSPITAL ENCOUNTER (EMERGENCY)
Facility: HOSPITAL | Age: 34
Discharge: HOME | End: 2024-06-26
Attending: EMERGENCY MEDICINE
Payer: COMMERCIAL

## 2024-06-26 VITALS
DIASTOLIC BLOOD PRESSURE: 93 MMHG | TEMPERATURE: 97.8 F | SYSTOLIC BLOOD PRESSURE: 139 MMHG | HEART RATE: 78 BPM | WEIGHT: 293 LBS | HEIGHT: 66 IN | BODY MASS INDEX: 47.09 KG/M2 | OXYGEN SATURATION: 96 % | RESPIRATION RATE: 20 BRPM

## 2024-06-26 DIAGNOSIS — M54.9 BACK PAIN, UNSPECIFIED BACK LOCATION, UNSPECIFIED BACK PAIN LATERALITY, UNSPECIFIED CHRONICITY: Primary | ICD-10-CM

## 2024-06-26 PROCEDURE — 96375 TX/PRO/DX INJ NEW DRUG ADDON: CPT

## 2024-06-26 PROCEDURE — 99283 EMERGENCY DEPT VISIT LOW MDM: CPT

## 2024-06-26 PROCEDURE — 96374 THER/PROPH/DIAG INJ IV PUSH: CPT

## 2024-06-26 PROCEDURE — 25010000002 KETOROLAC TROMETHAMINE PER 15 MG: Performed by: EMERGENCY MEDICINE

## 2024-06-26 PROCEDURE — 25010000002 ORPHENADRINE CITRATE PER 60 MG: Performed by: EMERGENCY MEDICINE

## 2024-06-26 RX ORDER — CYCLOBENZAPRINE HCL 10 MG
10 TABLET ORAL 3 TIMES DAILY
Qty: 20 TABLET | Refills: 0 | Status: SHIPPED | OUTPATIENT
Start: 2024-06-26

## 2024-06-26 RX ORDER — KETOROLAC TROMETHAMINE 30 MG/ML
30 INJECTION, SOLUTION INTRAMUSCULAR; INTRAVENOUS ONCE
Status: COMPLETED | OUTPATIENT
Start: 2024-06-26 | End: 2024-06-26

## 2024-06-26 RX ORDER — OXYCODONE HYDROCHLORIDE AND ACETAMINOPHEN 5; 325 MG/1; MG/1
1 TABLET ORAL ONCE
Status: COMPLETED | OUTPATIENT
Start: 2024-06-26 | End: 2024-06-26

## 2024-06-26 RX ORDER — ORPHENADRINE CITRATE 30 MG/ML
60 INJECTION INTRAMUSCULAR; INTRAVENOUS ONCE
Status: COMPLETED | OUTPATIENT
Start: 2024-06-26 | End: 2024-06-26

## 2024-06-26 RX ORDER — ATORVASTATIN CALCIUM 20 MG/1
20 TABLET, FILM COATED ORAL DAILY
COMMUNITY

## 2024-06-26 RX ORDER — DICLOFENAC SODIUM AND MISOPROSTOL 50; 200 MG/1; UG/1
1 TABLET, DELAYED RELEASE ORAL 2 TIMES DAILY
COMMUNITY

## 2024-06-26 RX ORDER — LISDEXAMFETAMINE DIMESYLATE 40 MG/1
40 CAPSULE ORAL EVERY MORNING
COMMUNITY

## 2024-06-26 RX ORDER — KETOROLAC TROMETHAMINE 10 MG/1
10 TABLET, FILM COATED ORAL EVERY 6 HOURS PRN
Qty: 15 TABLET | Refills: 0 | Status: SHIPPED | OUTPATIENT
Start: 2024-06-26

## 2024-06-26 RX ORDER — OXYCODONE HYDROCHLORIDE AND ACETAMINOPHEN 5; 325 MG/1; MG/1
1 TABLET ORAL EVERY 6 HOURS PRN
Qty: 12 TABLET | Refills: 0 | Status: SHIPPED | OUTPATIENT
Start: 2024-06-26

## 2024-06-26 RX ORDER — FERROUS SULFATE 324(65)MG
324 TABLET, DELAYED RELEASE (ENTERIC COATED) ORAL
COMMUNITY

## 2024-06-26 RX ADMIN — KETOROLAC TROMETHAMINE 30 MG: 30 INJECTION, SOLUTION INTRAMUSCULAR; INTRAVENOUS at 14:42

## 2024-06-26 RX ADMIN — OXYCODONE AND ACETAMINOPHEN 1 TABLET: 5; 325 TABLET ORAL at 14:41

## 2024-06-26 RX ADMIN — ORPHENADRINE CITRATE 60 MG: 30 INJECTION, SOLUTION INTRAMUSCULAR; INTRAVENOUS at 14:42

## 2024-06-26 NOTE — ED PROVIDER NOTES
Time: 2:20 PM EDT  Date of encounter:  2024  Independent Historian/Clinical History and Information was obtained by:   Patient    History is limited by: N/A    Chief Complaint: Back pain      History of Present Illness:  Patient is a 33 y.o. year old female who presents to the emergency department for evaluation of back pain that got worse today.  The patient reports that she has a history of degenerative disc disease.  She states that she was at a disability office and the practitioner did a lot of range of motion exercises that made things worse.  Patient denies subjective neurological deficit.  Patient has no chest pain or shortness of breath.  Patient had no cough hemoptysis.  Patient has had no fecal or urinary incontinence.    HPI    Patient Care Team  Primary Care Provider: Martha Finley APRN    Past Medical History:     Allergies   Allergen Reactions    Lamotrigine GI Intolerance    Sulfa Antibiotics Hives, Itching and Rash     Past Medical History:   Diagnosis Date    ADHD (attention deficit hyperactivity disorder) 3/1/2023    Allergic 1990    Anxiety     Arthritis     Bipolar mood disorder     Broken bones     Cataract     Class 3 severe obesity due to excess calories with serious comorbidity and body mass index (BMI) of 50.0 to 59.9 in adult 2021    DDD (degenerative disc disease), lumbar     Depression     Foot pain     Generalized anxiety disorder with panic attacks 2021    Heel pain     Hypertension 3/1/2023    Hypoglycemia     Infectious viral hepatitis 2023    C    Leg pain     Limb swelling     Low back pain 2019    Muscle cramps     Numbness in feet     Prediabetes     Psychiatric disorder     Seasonal allergies      Past Surgical History:   Procedure Laterality Date     SECTION      x 2    FOOT SURGERY      HYSTERECTOMY      LASER ABLATION      LUMBAR DISCECTOMY Left 2020    MINIMALLY INVASIVE L5-S1    OTHER SURGICAL HISTORY      METAL  IMPLANTS     Family History   Problem Relation Age of Onset    Diabetes Mother         Unspecified    Arthritis Mother     Arthritis Father     Cancer Father         Unspecified       Home Medications:  Prior to Admission medications    Medication Sig Start Date End Date Taking? Authorizing Provider   atorvastatin (LIPITOR) 20 MG tablet Take 1 tablet by mouth Daily.    Héctor Mayes MD   buprenorphine-naloxone (SUBOXONE) 8-2 MG per SL tablet Place 1 tablet under the tongue 2 (Two) Times a Day.  Patient not taking: Reported on 5/15/2024 11/14/22   Héctor Mayes MD   dexmethylphenidate (FOCALIN) 10 MG tablet Take 1 tab at noon daily 12/28/23   Fransisca Carver DO   dexmethylphenidate XR (FOCALIN XR) 20 MG 24 hr capsule TAKE 1 CAPSULE BY MOUTH EVERY MORNING 12/28/23   Fransisca Carver DO   diclofenac-miSOPROStol (ARTHROTEC 50) 50-0.2 MG EC tablet Take 1 tablet by mouth 2 (Two) Times a Day.    Héctor Mayes MD   ferrous sulfate 324 (65 Fe) MG tablet delayed-release EC tablet Take 1 tablet by mouth Daily With Breakfast.    Héctor Mayes MD   ibuprofen (ADVIL,MOTRIN) 800 MG tablet Take 1 tablet by mouth 3 (Three) Times a Day As Needed. 3/5/22   Héctor Mayes MD   lisdexamfetamine (Vyvanse) 40 MG capsule Take 1 capsule by mouth Every Morning    Héctor Mayes MD   lisinopril (PRINIVIL,ZESTRIL) 20 MG tablet Take 1 tablet by mouth Daily. 12/28/23   Fransisca Carver DO   Lurasidone HCl (LATUDA) 80 MG tablet tablet TAKE 1 TABLET BY MOUTH DAILY. 12/28/23   Fransisca Carver DO   metoprolol tartrate (LOPRESSOR) 25 MG tablet Take 1 tablet by mouth 2 (Two) Times a Day.    Héctor Mayes MD   ondansetron (Zofran) 4 MG tablet Take 1 tablet by mouth Every 8 (Eight) Hours As Needed for Nausea or Vomiting. 2/9/23   Michelet Khan APRN   Prucalopride Succinate (Motegrity) 2 MG tablet Take 1 tablet by mouth Daily. 4/7/23   Fransisca Carver DO   Vraylar 3 MG capsule capsule  4/10/24    "Provider, Historical, MD        Social History:   Social History     Tobacco Use    Smoking status: Every Day     Current packs/day: 0.50     Average packs/day: 0.5 packs/day for 11.5 years (5.7 ttl pk-yrs)     Types: Cigarettes     Start date: 1/1/2013    Smokeless tobacco: Never   Vaping Use    Vaping status: Former    Substances: Nicotine, Flavoring   Substance Use Topics    Alcohol use: Never    Drug use: Never         Review of Systems:  Review of Systems   Constitutional:  Negative for chills and fever.   HENT:  Negative for congestion, rhinorrhea and sore throat.    Eyes:  Negative for pain and visual disturbance.   Respiratory:  Negative for apnea, cough, chest tightness and shortness of breath.    Cardiovascular:  Negative for chest pain and palpitations.   Gastrointestinal:  Negative for abdominal pain, diarrhea, nausea and vomiting.   Genitourinary:  Negative for difficulty urinating and dysuria.   Musculoskeletal:  Positive for back pain. Negative for joint swelling and myalgias.   Skin:  Negative for color change.   Neurological:  Negative for seizures and headaches.   Psychiatric/Behavioral: Negative.     All other systems reviewed and are negative.       Physical Exam:  /95 (BP Location: Right arm, Patient Position: Sitting)   Pulse 84   Temp 97.8 °F (36.6 °C) (Oral)   Resp 20   Ht 167.6 cm (66\")   Wt (!) 146 kg (321 lb 10.4 oz)   SpO2 95%   BMI 51.92 kg/m²     Physical Exam  Vitals and nursing note reviewed.   Constitutional:       General: She is not in acute distress.     Appearance: Normal appearance. She is not toxic-appearing.   HENT:      Head: Normocephalic and atraumatic.      Jaw: There is normal jaw occlusion.   Eyes:      General: Lids are normal.      Extraocular Movements: Extraocular movements intact.      Conjunctiva/sclera: Conjunctivae normal.      Pupils: Pupils are equal, round, and reactive to light.   Cardiovascular:      Rate and Rhythm: Normal rate and regular " rhythm.      Pulses: Normal pulses.      Heart sounds: Normal heart sounds.   Pulmonary:      Effort: Pulmonary effort is normal. No respiratory distress.      Breath sounds: Normal breath sounds. No wheezing or rhonchi.   Abdominal:      General: Abdomen is flat.      Palpations: Abdomen is soft.      Tenderness: There is no abdominal tenderness. There is no guarding or rebound.   Musculoskeletal:         General: Normal range of motion.      Cervical back: Normal range of motion and neck supple.      Right lower leg: No edema.      Left lower leg: No edema.      Comments: (+) Right paraspinal muscular tenderness   Skin:     General: Skin is warm and dry.   Neurological:      Mental Status: She is alert and oriented to person, place, and time. Mental status is at baseline.   Psychiatric:         Mood and Affect: Mood normal.                  Procedures:  Procedures      Medical Decision Making:      Comorbidities that affect care:    Generative disc disease    External Notes reviewed:    Previous Clinic Note: Patient was last seen in clinic for back pain      The following orders were placed and all results were independently analyzed by me:  No orders of the defined types were placed in this encounter.      Medications Given in the Emergency Department:  Medications   ketorolac (TORADOL) injection 30 mg (30 mg Intravenous Given 6/26/24 1442)   oxyCODONE-acetaminophen (PERCOCET) 5-325 MG per tablet 1 tablet (1 tablet Oral Given 6/26/24 1441)   orphenadrine (NORFLEX) injection 60 mg (60 mg Intravenous Given 6/26/24 1442)        ED Course:         Labs:    Lab Results (last 24 hours)       ** No results found for the last 24 hours. **             Imaging:    No Radiology Exams Resulted Within Past 24 Hours      Differential Diagnosis and Discussion:    Back Pain: The patient presents with back pain. My differential diagnosis includes but is not limited to acute spinal epidural abscess, acute spinal epidural bleed,  cauda equina syndrome, abdominal aortic aneurysm, aortic dissection, kidney stone, pyelonephritis, musculoskeletal back pain, spinal fracture, and osteoarthritis.         MDM     The patient´s symptoms are consistent with musculoskeletal back pain. The patient is now resting comfortably, feels better, is alert, talkative, interactive and in no distress. The repeat examination is unremarkable and benign. The patient is neurologically intact and is ambulatory in the ED. The patient has no fever, no bowel or bladder incontinence, no saddle anesthesia, and is otherwise alert and well appearing. The history, physical exam, and diagnostics (if any) do not suggest the presence of acute spinal epidural abscess, acute spinal epidural bleed, cauda equina syndrome, abdominal aortic aneurysm, aortic dissection or other process requiring further testing, treatment or consultation in the emergency department. The vital signs have been stable. The patient's condition is stable and appropriate for discharge. The patient will pursue further outpatient evaluation with the primary care physician or other designated for consulting position as indicated in the discharge instructions.          Patient Care Considerations:    I considered ordering films, however the patient denies trauma.      Consultants/Shared Management Plan:    None    Social Determinants of Health:    Patient is independent, reliable, and has access to care.       Disposition and Care Coordination:    Discharged: The patient is suitable and stable for discharge with no need for consideration of admission.    I have explained the patient´s condition, diagnoses and treatment plan based on the information available to me at this time. I have answered questions and addressed any concerns. The patient has a good  understanding of the patient´s diagnosis, condition, and treatment plan as can be expected at this point. The vital signs have been stable. The patient´s  condition is stable and appropriate for discharge from the emergency department.      The patient will pursue further outpatient evaluation with the primary care physician or other designated or consulting physician as outlined in the discharge instructions. They are agreeable to this plan of care and follow-up instructions have been explained in detail. The patient has received these instructions in written format and has expressed an understanding of the discharge instructions. The patient is aware that any significant change in condition or worsening of symptoms should prompt an immediate return to this or the closest emergency department or call to 911.  I have explained discharge medications and the need for follow up with the patient/caretakers. This was also printed in the discharge instructions. Patient was discharged with the following medications and follow up:      Medication List        New Prescriptions      cyclobenzaprine 10 MG tablet  Commonly known as: FLEXERIL  Take 1 tablet by mouth 3 (Three) Times a Day.     ketorolac 10 MG tablet  Commonly known as: TORADOL  Take 1 tablet by mouth Every 6 (Six) Hours As Needed for Moderate Pain.     oxyCODONE-acetaminophen 5-325 MG per tablet  Commonly known as: PERCOCET  Take 1 tablet by mouth Every 6 (Six) Hours As Needed for Severe Pain.               Where to Get Your Medications        These medications were sent to [facility] - [place], [place] - [address] [place] [address] - [phone]  - [phone] [address] [address]      Phone: [phone]   cyclobenzaprine 10 MG tablet  ketorolac 10 MG tablet  oxyCODONE-acetaminophen 5-325 MG per tablet      Martha Finley, APRN  [address] [place] [id number] [phone]    In 2 days         Final diagnoses:   Back pain, unspecified back location, unspecified back pain laterality, unspecified chronicity        ED Disposition       ED Disposition   Discharge    Condition    Stable    Comment   --               This medical record created using voice recognition software.             Jennifer Goodman MD  06/26/24 7775

## 2024-06-28 ENCOUNTER — OFFICE VISIT (OUTPATIENT)
Dept: NEUROSURGERY | Facility: CLINIC | Age: 34
End: 2024-06-28
Payer: COMMERCIAL

## 2024-06-28 ENCOUNTER — HOSPITAL ENCOUNTER (OUTPATIENT)
Dept: GENERAL RADIOLOGY | Facility: HOSPITAL | Age: 34
Discharge: HOME | End: 2024-06-28
Payer: COMMERCIAL

## 2024-06-28 VITALS
WEIGHT: 293 LBS | BODY MASS INDEX: 47.09 KG/M2 | HEART RATE: 92 BPM | HEIGHT: 66 IN | SYSTOLIC BLOOD PRESSURE: 131 MMHG | DIASTOLIC BLOOD PRESSURE: 60 MMHG

## 2024-06-28 DIAGNOSIS — Z98.890 HISTORY OF LUMBAR SURGERY: ICD-10-CM

## 2024-06-28 DIAGNOSIS — M54.2 CERVICALGIA: ICD-10-CM

## 2024-06-28 DIAGNOSIS — M79.601 BILATERAL ARM PAIN: ICD-10-CM

## 2024-06-28 DIAGNOSIS — M79.602 BILATERAL ARM PAIN: ICD-10-CM

## 2024-06-28 DIAGNOSIS — M54.41 CHRONIC BILATERAL LOW BACK PAIN WITH BILATERAL SCIATICA: Primary | ICD-10-CM

## 2024-06-28 DIAGNOSIS — M51.36 DDD (DEGENERATIVE DISC DISEASE), LUMBAR: ICD-10-CM

## 2024-06-28 DIAGNOSIS — R20.0 NUMBNESS AND TINGLING IN LEFT ARM: ICD-10-CM

## 2024-06-28 DIAGNOSIS — R20.2 NUMBNESS AND TINGLING OF RIGHT ARM: ICD-10-CM

## 2024-06-28 DIAGNOSIS — R20.2 NUMBNESS AND TINGLING IN LEFT ARM: ICD-10-CM

## 2024-06-28 DIAGNOSIS — M54.42 CHRONIC BILATERAL LOW BACK PAIN WITH BILATERAL SCIATICA: Primary | ICD-10-CM

## 2024-06-28 DIAGNOSIS — G89.29 CHRONIC BILATERAL LOW BACK PAIN WITH BILATERAL SCIATICA: Primary | ICD-10-CM

## 2024-06-28 DIAGNOSIS — M47.816 LUMBAR FACET ARTHROPATHY: ICD-10-CM

## 2024-06-28 DIAGNOSIS — R20.0 NUMBNESS AND TINGLING OF RIGHT ARM: ICD-10-CM

## 2024-06-28 DIAGNOSIS — R29.898 WEAKNESS OF BOTH HANDS: ICD-10-CM

## 2024-06-28 PROCEDURE — 72050 X-RAY EXAM NECK SPINE 4/5VWS: CPT

## 2024-06-28 RX ORDER — METHYLPREDNISOLONE 4 MG/1
TABLET ORAL
Qty: 21 TABLET | Refills: 0 | Status: SHIPPED | OUTPATIENT
Start: 2024-06-28

## 2024-06-28 NOTE — PROGRESS NOTES
"Patient being seen for today for Follow-up  .    Subjective    Raven Eckert is a 33 y.o. female that presents with Follow-up  .    HPI  Previously: Last seen on 5/15/2024 for complaints of pain in both arms with numbness and tingling worse in the right arm to the middle fingers of the hand.  She had general weakness in both hands on examination.  She had positive Tinel's testing at the right wrist and elbow.  There were no signs of myelopathy on examination.  She had no imaging of the cervical spine.  There was NCV/EMG ordered for the upper extremities to assess for median or ulnar nerve neuropathy.  There is also an x-ray ordered for the cervical spine for initial evaluation. The x-ray was not completed. The NCV/EMG was not completed.    Today: She reports she has worse pain since having her \"disability exam\". She reported a migraine headache and went to the ER where her blood pressure was \"through the roof\".    She reports pain in the back radiating down the left greater than right posteriorly to about the knee. She reports numbness and tingling, and subjective weakness in the leg when she walks with \"limp to the left side\". She denies loss of bowel or bladder control.    At night her legs \"never stop moving\".    She reports pain in the bilateral arms today as well to the fingertips. She reports continued numbness and tingling in the hands.     reports that she has been smoking cigarettes. She started smoking about 11 years ago. She has a 5.6 pack-year smoking history. She has never used smokeless tobacco.    Review of Systems   Musculoskeletal:  Positive for back pain and neck pain.   Neurological:  Positive for weakness and numbness.       Objective   Vitals:    06/28/24 0839   BP: 131/60   Pulse: 92        Physical Exam  Constitutional:       Appearance: Normal appearance. She is obese.   Pulmonary:      Effort: Pulmonary effort is normal.   Musculoskeletal:         General: Tenderness (midline and " bilateral lumbar areas, bilateral cervical area) present.      Cervical back: Normal range of motion.      Comments: SLR positive bilaterally,  Devries's negative,  Tinel's negative   Neurological:      General: No focal deficit present.      Mental Status: She is alert and oriented to person, place, and time.      Sensory: No sensory deficit.      Motor: Weakness (right greater than left hand ) present.      Deep Tendon Reflexes: Reflexes normal.   Psychiatric:         Mood and Affect: Mood normal.         Behavior: Behavior normal.          Result Review   None.     Assessment and Plan {CC Problem List  Visit Diagnosis  ROS  Review (Popup)  [facility]  Quality  BestPractice  Medications  SmartSets  SnapShot Encounters  Media :23}   Diagnoses and all orders for this visit:    1. Chronic bilateral low back pain with bilateral sciatica (Primary)  -     Ambulatory Referral to Physical Therapy  -     MRI Lumbar Spine With & Without Contrast; Future  -     Ambulatory Referral to Pain Management    2. DDD (degenerative disc disease), lumbar  -     MRI Lumbar Spine With & Without Contrast; Future    3. Lumbar facet arthropathy  -     MRI Lumbar Spine With & Without Contrast; Future    4. Cervicalgia  -     Ambulatory Referral to Physical Therapy  -     Ambulatory Referral to Pain Management  -     XR Spine Cervical Complete 4 or 5 View; Future    5. Bilateral arm pain  -     XR Spine Cervical Complete 4 or 5 View; Future    6. Numbness and tingling in left arm    7. Numbness and tingling of right arm    8. Weakness of both hands    9. History of lumbar surgery  -     MRI Lumbar Spine With & Without Contrast; Future    Other orders  -     methylPREDNISolone (MEDROL) 4 MG dose pack; Take as directed on package instructions.  Dispense: 21 tablet; Refill: 0    She has pain worse in the back and posterior legs to the knees.    She also complains of neck pain and bilateral arm pain to the tips of the  fingers.    She has weakness on exam of both hands  strength.    I will refer her for physical therapy targeting the lumbar and cervical spines.    I will order an x-ray of the cervical spine to evaluate neck and arm pain.    Considering her surgical history of lumbar spine surgery in 2019, I will also order a new MRI of the lumbar spine with and without contrast to evaluate the lumbar spine pathology further for causes of lumbar radiculopathy.    I will refer her to pain management to discuss further management of pain.    The patient was counseled on basic recommendations for the reduction and prevention of back, neck, or spine pain in association with spinal disorders, including: cessation/avoidance of nicotine use, maintenance of a healthy BMI and weight, focusing on building/maintaining core strength through core exercise, and avoidance of activities which worsen the pain. The patient will monitor for changes in symptoms and notify our clinic of these changes as needed.    She will follow-up here in 4 weeks to reassess.  Follow Up {Instructions Charge Capture  Follow-up Communications :23}   Return in about 4 weeks (around 7/26/2024).

## 2024-07-29 ENCOUNTER — HOSPITAL ENCOUNTER (OUTPATIENT)
Facility: HOSPITAL | Age: 34
Discharge: HOME | End: 2024-07-29
Admitting: PHYSICIAN ASSISTANT
Payer: COMMERCIAL

## 2024-07-29 DIAGNOSIS — R29.898 WEAKNESS OF BOTH HANDS: ICD-10-CM

## 2024-07-29 DIAGNOSIS — R20.2 POSITIVE TINEL'S SIGN: ICD-10-CM

## 2024-07-29 PROCEDURE — 95886 MUSC TEST DONE W/N TEST COMP: CPT

## 2024-07-29 PROCEDURE — 95910 NRV CNDJ TEST 7-8 STUDIES: CPT | Performed by: PHYSICAL THERAPIST

## 2024-07-29 PROCEDURE — 95911 NRV CNDJ TEST 9-10 STUDIES: CPT

## 2024-07-29 PROCEDURE — 95886 MUSC TEST DONE W/N TEST COMP: CPT | Performed by: PHYSICAL THERAPIST

## 2024-08-15 ENCOUNTER — OFFICE VISIT (OUTPATIENT)
Dept: NEUROSURGERY | Facility: CLINIC | Age: 34
End: 2024-08-15
Payer: COMMERCIAL

## 2024-08-15 VITALS
BODY MASS INDEX: 47.09 KG/M2 | DIASTOLIC BLOOD PRESSURE: 82 MMHG | SYSTOLIC BLOOD PRESSURE: 148 MMHG | HEIGHT: 66 IN | WEIGHT: 293 LBS

## 2024-08-15 DIAGNOSIS — R20.0 NUMBNESS AND TINGLING IN LEFT ARM: ICD-10-CM

## 2024-08-15 DIAGNOSIS — R29.898 WEAKNESS OF BOTH HANDS: ICD-10-CM

## 2024-08-15 DIAGNOSIS — R20.0 NUMBNESS AND TINGLING OF RIGHT ARM: ICD-10-CM

## 2024-08-15 DIAGNOSIS — M54.2 CERVICALGIA: Primary | ICD-10-CM

## 2024-08-15 DIAGNOSIS — R20.2 NUMBNESS AND TINGLING OF RIGHT ARM: ICD-10-CM

## 2024-08-15 DIAGNOSIS — R20.2 NUMBNESS AND TINGLING IN LEFT ARM: ICD-10-CM

## 2024-08-15 PROCEDURE — 1160F RVW MEDS BY RX/DR IN RCRD: CPT | Performed by: NEUROLOGICAL SURGERY

## 2024-08-15 PROCEDURE — 99213 OFFICE O/P EST LOW 20 MIN: CPT | Performed by: NEUROLOGICAL SURGERY

## 2024-08-15 PROCEDURE — 3077F SYST BP >= 140 MM HG: CPT | Performed by: NEUROLOGICAL SURGERY

## 2024-08-15 PROCEDURE — 1159F MED LIST DOCD IN RCRD: CPT | Performed by: NEUROLOGICAL SURGERY

## 2024-08-15 PROCEDURE — 3079F DIAST BP 80-89 MM HG: CPT | Performed by: NEUROLOGICAL SURGERY

## 2024-08-15 RX ORDER — GABAPENTIN 100 MG/1
100 CAPSULE ORAL
COMMUNITY
Start: 2024-07-03

## 2024-08-15 RX ORDER — ERGOCALCIFEROL 1.25 MG/1
50000 CAPSULE ORAL
COMMUNITY
Start: 2024-08-01

## 2024-08-15 NOTE — PROGRESS NOTES
Raven Eckert is a 33 y.o. female that presents with neck pain and arm numbness.     She has neck pain and bilateral arm numbness. She had an EMG/NCV which showed bilateral carpal tunnel syndrome and a C6-7 radiculopathy. She has not yet had a cervical MRI. She has some degenerative changes on plain x-ray. She is currently being evaluated for Rheumatoid arthritis.     She has failed PT X one month. She has failed gabapentin and NSAIDs. She has failed muscle relaxants. She has failed steroid packs. She has failed pain medication.    Back Pain  Associated symptoms include numbness.       Review of Systems   Musculoskeletal:  Positive for back pain.   Neurological:  Positive for numbness.        Vitals:    08/15/24 1026   BP: 148/82        Physical Exam  Constitutional:       Comments: BMI 51   Pulmonary:      Effort: Pulmonary effort is normal.   Neurological:      Mental Status: She is alert.      Sensory: Sensory deficit (decreased to light touch in the right hand c/w with the left) present.      Motor: Weakness (right hand  decrease) present.   Psychiatric:         Mood and Affect: Mood normal.           Assessment and Plan {CC Problem List  Visit Diagnosis  ROS  Review (Popup)  [facility]  Quality  BestPractice  Medications  SmartSets  SnapShot Encounters  Media :23}   Problem List Items Addressed This Visit    None  Visit Diagnoses       Cervicalgia    -  Primary    Numbness and tingling of right arm        Numbness and tingling in left arm        Weakness of both hands            She had neck pain with arm pain, numbness and weakness. She has failed multiple conservative interventions including a month of PT (see above for full list). I have recommended/ordered a cervical MRI.     Follow Up {Instructions Charge Capture  Follow-up Communications :23}   No follow-ups on file.

## 2024-08-23 ENCOUNTER — HOSPITAL ENCOUNTER (EMERGENCY)
Facility: HOSPITAL | Age: 34
Discharge: HOME | End: 2024-08-23
Attending: EMERGENCY MEDICINE
Payer: COMMERCIAL

## 2024-08-23 VITALS
WEIGHT: 293 LBS | RESPIRATION RATE: 20 BRPM | TEMPERATURE: 97.8 F | SYSTOLIC BLOOD PRESSURE: 143 MMHG | OXYGEN SATURATION: 98 % | DIASTOLIC BLOOD PRESSURE: 96 MMHG | BODY MASS INDEX: 47.09 KG/M2 | HEART RATE: 100 BPM | HEIGHT: 66 IN

## 2024-08-23 DIAGNOSIS — M06.9 RHEUMATOID ARTHRITIS FLARE: Primary | ICD-10-CM

## 2024-08-23 LAB
ALBUMIN SERPL-MCNC: 3.9 G/DL (ref 3.5–5.2)
ALBUMIN/GLOB SERPL: 1.2 G/DL
ALP SERPL-CCNC: 94 U/L (ref 39–117)
ALT SERPL W P-5'-P-CCNC: 21 U/L (ref 1–33)
ANION GAP SERPL CALCULATED.3IONS-SCNC: 8.3 MMOL/L (ref 5–15)
AST SERPL-CCNC: 14 U/L (ref 1–32)
BASOPHILS # BLD AUTO: 0.05 10*3/MM3 (ref 0–0.2)
BASOPHILS NFR BLD AUTO: 0.5 % (ref 0–1.5)
BILIRUB SERPL-MCNC: <0.2 MG/DL (ref 0–1.2)
BUN SERPL-MCNC: 10 MG/DL (ref 6–20)
BUN/CREAT SERPL: 11.2 (ref 7–25)
CALCIUM SPEC-SCNC: 9 MG/DL (ref 8.6–10.5)
CHLORIDE SERPL-SCNC: 105 MMOL/L (ref 98–107)
CO2 SERPL-SCNC: 24.7 MMOL/L (ref 22–29)
CREAT SERPL-MCNC: 0.89 MG/DL (ref 0.57–1)
DEPRECATED RDW RBC AUTO: 43.5 FL (ref 37–54)
EGFRCR SERPLBLD CKD-EPI 2021: 87.9 ML/MIN/1.73
EOSINOPHIL # BLD AUTO: 0.26 10*3/MM3 (ref 0–0.4)
EOSINOPHIL NFR BLD AUTO: 2.7 % (ref 0.3–6.2)
ERYTHROCYTE [DISTWIDTH] IN BLOOD BY AUTOMATED COUNT: 12.5 % (ref 12.3–15.4)
GLOBULIN UR ELPH-MCNC: 3.2 GM/DL
GLUCOSE SERPL-MCNC: 96 MG/DL (ref 65–99)
HCT VFR BLD AUTO: 43.8 % (ref 34–46.6)
HGB BLD-MCNC: 14.7 G/DL (ref 12–15.9)
HOLD SPECIMEN: NORMAL
IMM GRANULOCYTES # BLD AUTO: 0.03 10*3/MM3 (ref 0–0.05)
IMM GRANULOCYTES NFR BLD AUTO: 0.3 % (ref 0–0.5)
LYMPHOCYTES # BLD AUTO: 3.22 10*3/MM3 (ref 0.7–3.1)
LYMPHOCYTES NFR BLD AUTO: 33.3 % (ref 19.6–45.3)
MCH RBC QN AUTO: 31.5 PG (ref 26.6–33)
MCHC RBC AUTO-ENTMCNC: 33.6 G/DL (ref 31.5–35.7)
MCV RBC AUTO: 94 FL (ref 79–97)
MONOCYTES # BLD AUTO: 0.63 10*3/MM3 (ref 0.1–0.9)
MONOCYTES NFR BLD AUTO: 6.5 % (ref 5–12)
NEUTROPHILS NFR BLD AUTO: 5.49 10*3/MM3 (ref 1.7–7)
NEUTROPHILS NFR BLD AUTO: 56.7 % (ref 42.7–76)
NRBC BLD AUTO-RTO: 0 /100 WBC (ref 0–0.2)
PLATELET # BLD AUTO: 299 10*3/MM3 (ref 140–450)
PMV BLD AUTO: 9.9 FL (ref 6–12)
POTASSIUM SERPL-SCNC: 4.6 MMOL/L (ref 3.5–5.2)
PROT SERPL-MCNC: 7.1 G/DL (ref 6–8.5)
RBC # BLD AUTO: 4.66 10*6/MM3 (ref 3.77–5.28)
SODIUM SERPL-SCNC: 138 MMOL/L (ref 136–145)
WBC NRBC COR # BLD AUTO: 9.68 10*3/MM3 (ref 3.4–10.8)
WHOLE BLOOD HOLD COAG: NORMAL

## 2024-08-23 PROCEDURE — 85025 COMPLETE CBC W/AUTO DIFF WBC: CPT

## 2024-08-23 PROCEDURE — 25010000002 DEXAMETHASONE SODIUM PHOSPHATE 10 MG/ML SOLUTION

## 2024-08-23 PROCEDURE — 80053 COMPREHEN METABOLIC PANEL: CPT

## 2024-08-23 PROCEDURE — 99283 EMERGENCY DEPT VISIT LOW MDM: CPT

## 2024-08-23 PROCEDURE — 25810000003 SODIUM CHLORIDE 0.9 % SOLUTION

## 2024-08-23 PROCEDURE — 96374 THER/PROPH/DIAG INJ IV PUSH: CPT

## 2024-08-23 PROCEDURE — 96375 TX/PRO/DX INJ NEW DRUG ADDON: CPT

## 2024-08-23 PROCEDURE — 25010000002 KETOROLAC TROMETHAMINE PER 15 MG

## 2024-08-23 RX ORDER — DEXAMETHASONE SODIUM PHOSPHATE 10 MG/ML
10 INJECTION, SOLUTION INTRAMUSCULAR; INTRAVENOUS ONCE
Status: COMPLETED | OUTPATIENT
Start: 2024-08-23 | End: 2024-08-23

## 2024-08-23 RX ORDER — CYCLOBENZAPRINE HCL 10 MG
10 TABLET ORAL ONCE
Status: DISCONTINUED | OUTPATIENT
Start: 2024-08-23 | End: 2024-08-23 | Stop reason: HOSPADM

## 2024-08-23 RX ORDER — KETOROLAC TROMETHAMINE 30 MG/ML
30 INJECTION, SOLUTION INTRAMUSCULAR; INTRAVENOUS ONCE
Status: COMPLETED | OUTPATIENT
Start: 2024-08-23 | End: 2024-08-23

## 2024-08-23 RX ADMIN — DEXAMETHASONE SODIUM PHOSPHATE 10 MG: 10 INJECTION INTRAMUSCULAR; INTRAVENOUS at 13:32

## 2024-08-23 RX ADMIN — SODIUM CHLORIDE 500 ML: 9 INJECTION, SOLUTION INTRAVENOUS at 13:29

## 2024-08-23 RX ADMIN — KETOROLAC TROMETHAMINE 30 MG: 30 INJECTION, SOLUTION INTRAMUSCULAR; INTRAVENOUS at 13:32

## 2024-08-23 NOTE — ED PROVIDER NOTES
Time: 1:39 PM EDT  Date of encounter:  2024  Independent Historian/Clinical History and Information was obtained by:   Patient    History is limited by: N/A    Chief Complaint: Generalized pain      History of Present Illness:  Patient is a 33 y.o. year old female who presents to the emergency department for evaluation of generalized body pain began 1 week ago.  Patient states she was recently diagnosed with rheumatoid arthritis and carpal tunnel in her hands.  Patient states she has a follow-up with rheumatology but has not had her appointment yet.  Patient denies any injury/trauma.  Patient denies headache and blurry vision.  Patient denies chest pain and shortness of breath.      Patient Care Team  Primary Care Provider: Martha Finley APRN    Past Medical History:     Allergies   Allergen Reactions    Lamotrigine GI Intolerance    Sulfa Antibiotics Hives, Itching and Rash     Past Medical History:   Diagnosis Date    ADHD (attention deficit hyperactivity disorder) 3/1/2023    Allergic 1990    Anxiety     Arthritis     Bipolar mood disorder     Broken bones     Cataract     Class 3 severe obesity due to excess calories with serious comorbidity and body mass index (BMI) of 50.0 to 59.9 in adult 2021    DDD (degenerative disc disease), lumbar     Depression     Foot pain     Generalized anxiety disorder with panic attacks 2021    Heel pain     Hypertension 3/1/2023    Hypoglycemia     Infectious viral hepatitis 2023    C    Leg pain     Limb swelling     Low back pain 2019    Muscle cramps     Numbness in feet     Prediabetes     Psychiatric disorder     Seasonal allergies      Past Surgical History:   Procedure Laterality Date     SECTION      x 2    FOOT SURGERY      HYSTERECTOMY      LASER ABLATION      LUMBAR DISCECTOMY Left 2020    MINIMALLY INVASIVE L5-S1    OTHER SURGICAL HISTORY      METAL IMPLANTS     Family History   Problem Relation Age of Onset     Diabetes Mother         Unspecified    Arthritis Mother     Arthritis Father     Cancer Father         Unspecified       Home Medications:  Prior to Admission medications    Medication Sig Start Date End Date Taking? Authorizing Provider   atorvastatin (LIPITOR) 20 MG tablet Take 1 tablet by mouth Daily.    Héctor Mayes MD   cyclobenzaprine (FLEXERIL) 10 MG tablet Take 1 tablet by mouth 3 (Three) Times a Day. 6/26/24   Jennifer Goodman MD   dexmethylphenidate (FOCALIN) 10 MG tablet Take 1 tab at noon daily 12/28/23   Fransisca Carver DO   dexmethylphenidate XR (FOCALIN XR) 20 MG 24 hr capsule TAKE 1 CAPSULE BY MOUTH EVERY MORNING 12/28/23   Fransisca Carver DO   diclofenac-miSOPROStol (ARTHROTEC 50) 50-0.2 MG EC tablet Take 1 tablet by mouth 2 (Two) Times a Day.    Héctor Mayes MD   ferrous sulfate 324 (65 Fe) MG tablet delayed-release EC tablet Take 1 tablet by mouth Daily With Breakfast.    Héctor Mayes MD   gabapentin (NEURONTIN) 100 MG capsule Take 1 capsule by mouth. Patient takes 2 100 mg capsules BID 7/3/24   Héctor Mayes MD   ibuprofen (ADVIL,MOTRIN) 800 MG tablet Take 1 tablet by mouth 3 (Three) Times a Day As Needed. 3/5/22   Héctor Mayes MD   ketorolac (TORADOL) 10 MG tablet Take 1 tablet by mouth Every 6 (Six) Hours As Needed for Moderate Pain. 6/26/24   Jennifer Goodman MD   lisdexamfetamine (Vyvanse) 40 MG capsule Take 1 capsule by mouth Every Morning    Héctor Mayes MD   lisinopril (PRINIVIL,ZESTRIL) 20 MG tablet Take 1 tablet by mouth Daily. 12/28/23   Fransisca Carver DO   Lurasidone HCl (LATUDA) 80 MG tablet tablet TAKE 1 TABLET BY MOUTH DAILY. 12/28/23   Fransisca Carver DO   metoprolol tartrate (LOPRESSOR) 25 MG tablet Take 1 tablet by mouth 2 (Two) Times a Day.    Héctor Mayes MD   ondansetron (Zofran) 4 MG tablet Take 1 tablet by mouth Every 8 (Eight) Hours As Needed for Nausea or Vomiting. 2/9/23   Michelet Khan, APRN  "  oxyCODONE-acetaminophen (PERCOCET) 5-325 MG per tablet Take 1 tablet by mouth Every 6 (Six) Hours As Needed for Severe Pain. 6/26/24   Jennifer Goodman MD   Prucalopride Succinate (Motegrity) 2 MG tablet Take 1 tablet by mouth Daily. 4/7/23   Fransisca Carver DO   vitamin D (ERGOCALCIFEROL) 1.25 MG (73025 UT) capsule capsule Take 1 capsule by mouth Every 7 (Seven) Days. 8/1/24   Provider, MD Héctor   Vraylar 3 MG capsule capsule  4/10/24   Provider, MD Héctor        Social History:   Social History     Tobacco Use    Smoking status: Every Day     Current packs/day: 0.25     Average packs/day: 0.5 packs/day for 11.6 years (5.7 ttl pk-yrs)     Types: Cigarettes     Start date: 1/1/2013    Smokeless tobacco: Never    Tobacco comments:     Smoking about 3 a day    Vaping Use    Vaping status: Former    Substances: Nicotine, Flavoring   Substance Use Topics    Alcohol use: Never    Drug use: Never         Review of Systems:  Review of Systems   Constitutional:  Negative for chills and fever.   HENT:  Negative for congestion, rhinorrhea and sore throat.    Eyes:  Negative for pain and visual disturbance.   Respiratory:  Negative for apnea, cough, chest tightness and shortness of breath.    Cardiovascular:  Negative for chest pain and palpitations.   Gastrointestinal:  Negative for abdominal pain, diarrhea, nausea and vomiting.   Genitourinary:  Negative for difficulty urinating and dysuria.   Musculoskeletal:  Negative for joint swelling and myalgias.   Skin:  Negative for color change.   Neurological:  Negative for seizures and headaches.   Psychiatric/Behavioral: Negative.     All other systems reviewed and are negative.       Physical Exam:  /96 (BP Location: Right arm, Patient Position: Lying)   Pulse 100   Temp 97.8 °F (36.6 °C) (Oral)   Resp 20   Ht 167.6 cm (66\")   Wt (!) 150 kg (330 lb 11 oz)   SpO2 98%   BMI 53.37 kg/m²     Physical Exam  Vitals and nursing note reviewed. "   Constitutional:       General: She is not in acute distress.     Appearance: Normal appearance. She is not toxic-appearing.   HENT:      Head: Normocephalic and atraumatic.      Jaw: There is normal jaw occlusion.   Eyes:      General: Lids are normal.      Extraocular Movements: Extraocular movements intact.      Conjunctiva/sclera: Conjunctivae normal.      Pupils: Pupils are equal, round, and reactive to light.   Cardiovascular:      Rate and Rhythm: Normal rate and regular rhythm.      Pulses: Normal pulses.      Heart sounds: Normal heart sounds.   Pulmonary:      Effort: Pulmonary effort is normal. No respiratory distress.      Breath sounds: Normal breath sounds. No wheezing or rhonchi.   Abdominal:      General: Abdomen is flat.      Palpations: Abdomen is soft.      Tenderness: There is no abdominal tenderness. There is no guarding or rebound.   Musculoskeletal:         General: Normal range of motion.      Cervical back: Normal range of motion and neck supple.      Right lower leg: No edema.      Left lower leg: No edema.   Skin:     General: Skin is warm and dry.   Neurological:      Mental Status: She is alert and oriented to person, place, and time. Mental status is at baseline.   Psychiatric:         Mood and Affect: Mood normal.                  Procedures:  Procedures      Medical Decision Making:      Comorbidities that affect care:    Hypertension    External Notes reviewed:          The following orders were placed and all results were independently analyzed by me:  Orders Placed This Encounter   Procedures    Comprehensive Metabolic Panel    CBC Auto Differential    CBC & Differential    Extra Tubes    Gold Top - SST    Light Blue Top       Medications Given in the Emergency Department:  Medications   cyclobenzaprine (FLEXERIL) tablet 10 mg (10 mg Oral Not Given 8/23/24 1335)   dexAMETHasone sodium phosphate injection 10 mg (10 mg Intravenous Given 8/23/24 1332)   ketorolac (TORADOL) injection  30 mg (30 mg Intravenous Given 8/23/24 1332)   sodium chloride 0.9 % bolus 500 mL (500 mL Intravenous New Bag 8/23/24 1329)        ED Course:         Labs:    Lab Results (last 24 hours)       Procedure Component Value Units Date/Time    Comprehensive Metabolic Panel [[id number]] Collected: 08/23/24 1329    Specimen: Blood Updated: 08/23/24 1411     Glucose 96 mg/dL      BUN 10 mg/dL      Creatinine 0.89 mg/dL      Sodium 138 mmol/L      Potassium 4.6 mmol/L      Chloride 105 mmol/L      CO2 24.7 mmol/L      Calcium 9.0 mg/dL      Total Protein 7.1 g/dL      Albumin 3.9 g/dL      ALT (SGPT) 21 U/L      AST (SGOT) 14 U/L      Alkaline Phosphatase 94 U/L      Total Bilirubin <0.2 mg/dL      Globulin 3.2 gm/dL      A/G Ratio 1.2 g/dL      BUN/Creatinine Ratio 11.2     Anion Gap 8.3 mmol/L      eGFR 87.9 mL/min/1.73     Narrative:      GFR Normal >60  Chronic Kidney Disease <60  Kidney Failure <15      CBC & Differential [[id number]]  (Abnormal) Collected: 08/23/24 1329    Specimen: Blood Updated: 08/23/24 1342    Narrative:      The following orders were created for panel order CBC & Differential.  Procedure                               Abnormality         Status                     ---------                               -----------         ------                     CBC Auto Differential[[id number]]        Abnormal            Final result                 Please view results for these tests on the individual orders.    CBC Auto Differential [[id number]]  (Abnormal) Collected: 08/23/24 1329    Specimen: Blood Updated: 08/23/24 1342     WBC 9.68 10*3/mm3      RBC 4.66 10*6/mm3      Hemoglobin 14.7 g/dL      Hematocrit 43.8 %      MCV 94.0 fL      MCH 31.5 pg      MCHC 33.6 g/dL      RDW 12.5 %      RDW-SD 43.5 fl      MPV 9.9 fL      Platelets 299 10*3/mm3      Neutrophil % 56.7 %      Lymphocyte % 33.3 %      Monocyte % 6.5 %      Eosinophil % 2.7 %      Basophil % 0.5 %      Immature Grans % 0.3 %      Neutrophils,  Absolute 5.49 10*3/mm3      Lymphocytes, Absolute 3.22 10*3/mm3      Monocytes, Absolute 0.63 10*3/mm3      Eosinophils, Absolute 0.26 10*3/mm3      Basophils, Absolute 0.05 10*3/mm3      Immature Grans, Absolute 0.03 10*3/mm3      nRBC 0.0 /100 WBC              Imaging:    No Radiology Exams Resulted Within Past 24 Hours      Differential Diagnosis and Discussion:    Myalgia: Differential diagnosis includes but is not limited to muscle overuse or strain, infections, inflammatory conditions, autoimmune diseases, medications, metabolic disorders, fibromyalgia, vascular disorders, neurological conditions, psychological factors, toxins, and muscle disorders.    All labs were reviewed and interpreted by me.    MDM     Amount and/or Complexity of Data Reviewed  Clinical lab tests: reviewed       The patient´s CBC that was reviewed and interpreted by me shows no abnormalities of critical concern. Of note, there is no anemia requiring a blood transfusion and the platelet count is acceptable.  The patient´s CMP that was reviewed and interpretted by me shows no abnormalities of critical concern. Of note, the patient´s sodium and potassium are acceptable. The patient´s liver enzymes are unremarkable. The patient´s renal function (creatinine) is preserved. The patient has a normal anion gap.  Gave patient steroid anti-inflammatory ED.  Patient has no localized pain.  Patient states she has an appoint with her PCP on Monday and is scheduled to see rheumatology.  I instructed patient to follow-up PCP and see a rheumatologist.  Patient's oxygen saturation is 98% on room air.  Patient is afebrile.  Instructed patient to return to ED if she develops any new or worsening symptoms.  Patient states she understands and agrees with plan of care.          Patient Care Considerations:          Consultants/Shared Management Plan:    None    Social Determinants of Health:    Patient is independent, reliable, and has access to care.        Disposition and Care Coordination:    Discharged: The patient is suitable and stable for discharge with no need for consideration of admission.    I have explained the patient´s condition, diagnoses and treatment plan based on the information available to me at this time. I have answered questions and addressed any concerns. The patient has a good  understanding of the patient´s diagnosis, condition, and treatment plan as can be expected at this point. The vital signs have been stable. The patient´s condition is stable and appropriate for discharge from the emergency department.      The patient will pursue further outpatient evaluation with the primary care physician or other designated or consulting physician as outlined in the discharge instructions. They are agreeable to this plan of care and follow-up instructions have been explained in detail. The patient has received these instructions in written format and has expressed an understanding of the discharge instructions. The patient is aware that any significant change in condition or worsening of symptoms should prompt an immediate return to this or the closest emergency department or call to 911.  I have explained discharge medications and the need for follow up with the patient/caretakers. This was also printed in the discharge instructions. Patient was discharged with the following medications and follow up:      Medication List      No changes were made to your prescriptions during this visit.      Martha Finley, APRN  [address] [id number] [phone]    Go in 2 days  To follow-up       Final diagnoses:   Rheumatoid arthritis flare        ED Disposition       ED Disposition   Discharge    Condition   Stable    Comment   --               This medical record created using voice recognition software.             Miguel Russo PA-C  08/23/24 [id number]

## 2024-08-27 ENCOUNTER — TELEPHONE (OUTPATIENT)
Dept: NEUROSURGERY | Facility: CLINIC | Age: 34
End: 2024-08-27
Payer: COMMERCIAL

## 2024-08-27 NOTE — TELEPHONE ENCOUNTER
MRI of cervical w/o contrast was placed by Jad Hardy, it  was sent to [facility] for per authorization but it was denied by insurance.

## 2024-08-27 NOTE — TELEPHONE ENCOUNTER
At patient's last visit Dr. Street ordered MRI cervical spine. She states she received a denial letter in the mail for MRI lumbar spine. Could you check to make sure the correct pre auth was requested? I do not see anything in patient's chart regarding cervical spine MRI. If that is the case, she still needs MRI cervical spine.

## 2024-08-30 ENCOUNTER — HOSPITAL ENCOUNTER (OUTPATIENT)
Dept: MRI IMAGING | Facility: HOSPITAL | Age: 34
Discharge: HOME | End: 2024-08-30
Payer: COMMERCIAL

## 2024-08-30 DIAGNOSIS — R29.898 WEAKNESS OF BOTH HANDS: ICD-10-CM

## 2024-08-30 DIAGNOSIS — R20.2 NUMBNESS AND TINGLING OF RIGHT ARM: ICD-10-CM

## 2024-08-30 DIAGNOSIS — M54.2 CERVICALGIA: ICD-10-CM

## 2024-08-30 DIAGNOSIS — R20.2 NUMBNESS AND TINGLING IN LEFT ARM: ICD-10-CM

## 2024-08-30 DIAGNOSIS — R20.0 NUMBNESS AND TINGLING IN LEFT ARM: ICD-10-CM

## 2024-08-30 DIAGNOSIS — R20.0 NUMBNESS AND TINGLING OF RIGHT ARM: ICD-10-CM

## 2024-08-30 PROCEDURE — 72141 MRI NECK SPINE W/O DYE: CPT

## 2024-09-12 ENCOUNTER — OFFICE VISIT (OUTPATIENT)
Dept: NEUROSURGERY | Facility: CLINIC | Age: 34
End: 2024-09-12
Payer: COMMERCIAL

## 2024-09-12 VITALS
SYSTOLIC BLOOD PRESSURE: 132 MMHG | HEIGHT: 66 IN | WEIGHT: 293 LBS | DIASTOLIC BLOOD PRESSURE: 84 MMHG | BODY MASS INDEX: 47.09 KG/M2

## 2024-09-12 DIAGNOSIS — M54.2 CERVICALGIA: Primary | ICD-10-CM

## 2024-09-12 RX ORDER — GABAPENTIN 300 MG/1
300 CAPSULE ORAL 2 TIMES DAILY
COMMUNITY

## 2024-09-12 RX ORDER — TRAMADOL HYDROCHLORIDE 50 MG/1
50 TABLET ORAL EVERY 6 HOURS PRN
COMMUNITY
Start: 2024-08-22

## 2024-09-12 NOTE — PROGRESS NOTES
Raven Eckert is a 33 y.o. female that presents with Neck Pain       She has some continued numbness into right greater than left. She had a cervical MRI which showed no notable canal or foraminal narrowing (mild at C3-4 and C4-5). She has tried splints without relief. PT on the neck was actually making her symptoms worse. She had a recent EMG/NCV with moderate right and mild left carpal tunnel syndrome. She is seeing a rheumatologist in early October.     Neck Pain         Review of Systems   Musculoskeletal:  Positive for neck pain.        Vitals:    09/12/24 1018   BP: 132/84        Physical Exam  Constitutional:       Appearance: She is obese.   Pulmonary:      Effort: Pulmonary effort is normal.   Neurological:      Mental Status: She is alert and oriented to person, place, and time.   Psychiatric:         Mood and Affect: Mood normal.        I personally interpreted the patient's MRI scan which shows no notable canal or foraminal narrowing.        Assessment and Plan {CC Problem List  Visit Diagnosis  ROS  Review (Popup)  Health Maintenance  Quality  BestPractice  Medications  SmartSets  SnapShot Encounters  Media :23}   Problem List Items Addressed This Visit    None  Visit Diagnoses       Cervicalgia    -  Primary        She will f/u here with any worsened pain or concerns.    She will keep her rheumatologist appt and f/u on her possible diagnosis of RA.    Follow Up {Instructions Charge Capture  Follow-up Communications :23}   No follow-ups on file.

## 2025-02-26 DIAGNOSIS — M25.50 PAIN IN JOINT, MULTIPLE SITES: ICD-10-CM

## 2025-02-26 DIAGNOSIS — Z79.4 ENCOUNTER FOR LONG-TERM (CURRENT) USE OF INSULIN: Primary | ICD-10-CM

## 2025-02-26 DIAGNOSIS — Z11.1 SCREENING FOR TUBERCULOSIS: ICD-10-CM

## 2025-03-17 ENCOUNTER — HOSPITAL ENCOUNTER (OUTPATIENT)
Dept: GENERAL RADIOLOGY | Facility: HOSPITAL | Age: 35
Discharge: HOME | End: 2025-03-17
Admitting: INTERNAL MEDICINE
Payer: COMMERCIAL

## 2025-03-17 DIAGNOSIS — M25.50 PAIN IN JOINT, MULTIPLE SITES: ICD-10-CM

## 2025-03-17 DIAGNOSIS — Z11.1 SCREENING FOR TUBERCULOSIS: ICD-10-CM

## 2025-03-17 PROCEDURE — 73610 X-RAY EXAM OF ANKLE: CPT

## 2025-03-17 PROCEDURE — 71046 X-RAY EXAM CHEST 2 VIEWS: CPT

## 2025-03-17 PROCEDURE — 73630 X-RAY EXAM OF FOOT: CPT

## 2025-03-17 PROCEDURE — 73130 X-RAY EXAM OF HAND: CPT

## 2025-03-17 PROCEDURE — 73100 X-RAY EXAM OF WRIST: CPT

## 2025-03-26 ENCOUNTER — LAB (OUTPATIENT)
Dept: LAB | Facility: HOSPITAL | Age: 35
End: 2025-03-26
Payer: COMMERCIAL

## 2025-03-26 DIAGNOSIS — E55.9 VITAMIN D DEFICIENCY: ICD-10-CM

## 2025-03-26 DIAGNOSIS — Z79.4 ENCOUNTER FOR LONG-TERM (CURRENT) USE OF INSULIN: Primary | ICD-10-CM

## 2025-03-26 DIAGNOSIS — Z11.59 SCREENING FOR VIRAL DISEASE: ICD-10-CM

## 2025-03-26 DIAGNOSIS — M25.561 ACUTE PAIN OF BOTH KNEES: ICD-10-CM

## 2025-03-26 DIAGNOSIS — Z79.4 ENCOUNTER FOR LONG-TERM (CURRENT) USE OF INSULIN: ICD-10-CM

## 2025-03-26 DIAGNOSIS — M25.562 ACUTE PAIN OF BOTH KNEES: ICD-10-CM

## 2025-03-26 LAB
25(OH)D3 SERPL-MCNC: 24.4 NG/ML (ref 30–100)
ALBUMIN SERPL-MCNC: 4 G/DL (ref 3.5–5.2)
ALBUMIN/GLOB SERPL: 1.4 G/DL
ALP SERPL-CCNC: 81 U/L (ref 39–117)
ALT SERPL W P-5'-P-CCNC: 18 U/L (ref 1–33)
ANION GAP SERPL CALCULATED.3IONS-SCNC: 11.4 MMOL/L (ref 5–15)
AST SERPL-CCNC: 13 U/L (ref 1–32)
BASOPHILS # BLD AUTO: 0.03 10*3/MM3 (ref 0–0.2)
BASOPHILS NFR BLD AUTO: 0.2 % (ref 0–1.5)
BILIRUB SERPL-MCNC: <0.2 MG/DL (ref 0–1.2)
BUN SERPL-MCNC: 7 MG/DL (ref 6–20)
BUN/CREAT SERPL: 7.6 (ref 7–25)
CALCIUM SPEC-SCNC: 9.2 MG/DL (ref 8.6–10.5)
CHLORIDE SERPL-SCNC: 107 MMOL/L (ref 98–107)
CO2 SERPL-SCNC: 21.6 MMOL/L (ref 22–29)
CREAT SERPL-MCNC: 0.92 MG/DL (ref 0.57–1)
CRP SERPL-MCNC: 0.75 MG/DL (ref 0–0.5)
DEPRECATED RDW RBC AUTO: 45.3 FL (ref 37–54)
EGFRCR SERPLBLD CKD-EPI 2021: 84 ML/MIN/1.73
EOSINOPHIL # BLD AUTO: 0.2 10*3/MM3 (ref 0–0.4)
EOSINOPHIL NFR BLD AUTO: 1.6 % (ref 0.3–6.2)
ERYTHROCYTE [DISTWIDTH] IN BLOOD BY AUTOMATED COUNT: 12.8 % (ref 12.3–15.4)
ERYTHROCYTE [SEDIMENTATION RATE] IN BLOOD: 20 MM/HR (ref 0–20)
GLOBULIN UR ELPH-MCNC: 2.9 GM/DL
GLUCOSE SERPL-MCNC: 126 MG/DL (ref 65–99)
HAV IGM SERPL QL IA: ABNORMAL
HBV CORE IGM SERPL QL IA: ABNORMAL
HBV SURFACE AG SERPL QL IA: ABNORMAL
HCT VFR BLD AUTO: 44 % (ref 34–46.6)
HCV AB SER QL: REACTIVE
HGB BLD-MCNC: 14.4 G/DL (ref 12–15.9)
IMM GRANULOCYTES # BLD AUTO: 0.03 10*3/MM3 (ref 0–0.05)
IMM GRANULOCYTES NFR BLD AUTO: 0.2 % (ref 0–0.5)
LYMPHOCYTES # BLD AUTO: 3.69 10*3/MM3 (ref 0.7–3.1)
LYMPHOCYTES NFR BLD AUTO: 29.2 % (ref 19.6–45.3)
MCH RBC QN AUTO: 31 PG (ref 26.6–33)
MCHC RBC AUTO-ENTMCNC: 32.7 G/DL (ref 31.5–35.7)
MCV RBC AUTO: 94.8 FL (ref 79–97)
MONOCYTES # BLD AUTO: 0.88 10*3/MM3 (ref 0.1–0.9)
MONOCYTES NFR BLD AUTO: 7 % (ref 5–12)
NEUTROPHILS NFR BLD AUTO: 61.8 % (ref 42.7–76)
NEUTROPHILS NFR BLD AUTO: 7.81 10*3/MM3 (ref 1.7–7)
PLATELET # BLD AUTO: 307 10*3/MM3 (ref 140–450)
PMV BLD AUTO: 9.6 FL (ref 6–12)
POTASSIUM SERPL-SCNC: 4.2 MMOL/L (ref 3.5–5.2)
PROT SERPL-MCNC: 6.9 G/DL (ref 6–8.5)
RBC # BLD AUTO: 4.64 10*6/MM3 (ref 3.77–5.28)
SODIUM SERPL-SCNC: 140 MMOL/L (ref 136–145)
WBC NRBC COR # BLD AUTO: 12.64 10*3/MM3 (ref 3.4–10.8)

## 2025-03-26 PROCEDURE — 82306 VITAMIN D 25 HYDROXY: CPT

## 2025-03-26 PROCEDURE — 86431 RHEUMATOID FACTOR QUANT: CPT

## 2025-03-26 PROCEDURE — 36415 COLL VENOUS BLD VENIPUNCTURE: CPT

## 2025-03-26 PROCEDURE — 86200 CCP ANTIBODY: CPT

## 2025-03-26 PROCEDURE — 86140 C-REACTIVE PROTEIN: CPT

## 2025-03-26 PROCEDURE — 80053 COMPREHEN METABOLIC PANEL: CPT

## 2025-03-26 PROCEDURE — 85025 COMPLETE CBC W/AUTO DIFF WBC: CPT

## 2025-03-26 PROCEDURE — 85652 RBC SED RATE AUTOMATED: CPT

## 2025-03-26 PROCEDURE — 80074 ACUTE HEPATITIS PANEL: CPT

## 2025-03-28 LAB
CCP IGA+IGG SERPL IA-ACNC: 5 UNITS (ref 0–19)
RHEUMATOID FACT SERPL-ACNC: 23.5 IU/ML

## 2025-07-17 ENCOUNTER — OFFICE VISIT (OUTPATIENT)
Age: 35
End: 2025-07-17
Payer: COMMERCIAL

## 2025-07-17 VITALS
TEMPERATURE: 99.4 F | HEART RATE: 99 BPM | SYSTOLIC BLOOD PRESSURE: 120 MMHG | BODY MASS INDEX: 47.09 KG/M2 | WEIGHT: 293 LBS | DIASTOLIC BLOOD PRESSURE: 68 MMHG | HEIGHT: 66 IN

## 2025-07-17 DIAGNOSIS — M25.50 POLYARTHRALGIA: Primary | ICD-10-CM

## 2025-07-17 DIAGNOSIS — E66.01 MORBID OBESITY WITH BODY MASS INDEX (BMI) OF 50.0 TO 59.9 IN ADULT: ICD-10-CM

## 2025-07-17 DIAGNOSIS — Z72.0 TOBACCO USE: ICD-10-CM

## 2025-07-17 DIAGNOSIS — R76.8 ELEVATED RHEUMATOID FACTOR: ICD-10-CM

## 2025-07-17 DIAGNOSIS — L40.9 PSORIASIS: ICD-10-CM

## 2025-07-17 DIAGNOSIS — Z86.19 HISTORY OF POSITIVE HEPATITIS C: ICD-10-CM

## 2025-07-17 RX ORDER — DEXTROAMPHETAMINE SACCHARATE, AMPHETAMINE ASPARTATE MONOHYDRATE, DEXTROAMPHETAMINE SULFATE AND AMPHETAMINE SULFATE 7.5; 7.5; 7.5; 7.5 MG/1; MG/1; MG/1; MG/1
CAPSULE, EXTENDED RELEASE ORAL
COMMUNITY
Start: 2025-06-01

## 2025-07-17 RX ORDER — HYDROCODONE BITARTRATE AND ACETAMINOPHEN 7.5; 325 MG/1; MG/1
TABLET ORAL
COMMUNITY
Start: 2025-07-15

## 2025-07-17 RX ORDER — HYDROXYCHLOROQUINE SULFATE 200 MG/1
TABLET, FILM COATED ORAL
COMMUNITY
Start: 2025-06-26

## 2025-07-17 NOTE — PROGRESS NOTES
RHEUMATOLOGY NEW PATIENT VISIT  2025  Patient Name: Raven Eckert : 1990 Medical Record: [id number]  PCP: Martha Finley APRN  Referring provider: Jean Mandujano    REASON FOR CONSULTATION    Evaluate multiple joint pain, elevated rheumatoid factor    History of Present Illness  Raven Eckert is a 34 y.o. female diffuse multiple joint pains, elevated rheumatoid factor.  She has previously been seen by another rheumatologist for the past year.  She is here for evaluation after previous rheumatologist joining another practice that does not take the patient's insurance.    Rheumatology history:  She does not have any defined diagnosis but has been noted to have slightly elevated rheumatoid factor and her previous rheumatologist started her on hydroxychloroquine which she has not found helpful.        Her joint pain affects both large and small joints and she has never been diagnosed with fibromyalgia.  She has been experiencing numbness, stiffness, and carpal tunnel syndrome in both wrists. Painful knots have developed on her fingers, which feel as though they need to be popped but remain stuck. This issue is more pronounced in her right hand, although she is left-handed. Muscle contractions in her foot occur when standing, resembling nerve pain.   Her primary care physician has referred her to an orthopedic surgeon due to suspected arthritis. An MRI conducted a few years ago revealed significant wear and tear. She has undergone x-rays of her hands and a nerve conduction test.    She has been taking ibuprofen for stiffness, which provides some relief.     Review of [facility] history:  She has a history of degenerative disc disease and underwent back surgery in 2019, which included a discectomy. Prior to the surgery, she experienced spinal cord compression from a disc, leading to incontinence.      Review of her rheumatology system:  She denies Red eye (uveitis) Recurrent/previous  infections: gastroenteritis, Recurrent/previous infections: STDs , Recurrent/previous infections: tonsillitis, Dysphagia, IBD, Inflammatory back pain, Plantar fasciitis/Achilles tendinitis    She has a lifelong history of intermittent psoriasis. She used to see a dermatologist for this but found the treatments unhelpful. As she aged, the condition improved on its own. She occasionally gets scaly rashes in her groin area, which resolve spontaneously. She recently had a psoriatic rash on the back of her neck, for which she received steroid treatment from her family doctor. She also had a similar rash under her armpits around 10/2024 or 11/2024. A month ago, she developed small blisters on her palms and feet (possible palmo-plantar psoriasis). She does not have any current lesion.    She was diagnosed with hepatitis C in 2023 with a viral load of about 4 million copies. She indicates treatment completion, but there is no updated HCV RNA result.    PAST SURGICAL HISTORY:  She underwent back surgery in 2019, which included a discectomy.    SOCIAL HISTORY  The patient has almost quit smoking completely, currently smoking maybe three cigarettes a day. The patient started vaping to get rid of the cigarettes.    FAMILY HISTORY  The patient mentions that her whole family has been bigger. The patient's father lost his teeth at an early age due to bad genetics.    Results  Labs   - Rheumatoid factor: Raised   - CCP: Negative   - A1c: Normal at 5.4  3/26/2025  Sed rate WNL, CRP 0.75  CMP mild glucose elevation 126  Hepatitis C antibody positive  Vitamin D 24.4  RF-23.5, CCP negative  CBC mild leukocytosis with neutrophilia  4/11/2023  HCV [id number]    Imaging  See report below    Current Outpatient Medications:     amphetamine-dextroamphetamine XR (ADDERALL XR) 30 MG 24 hr capsule, , Disp: , Rfl:     atorvastatin (LIPITOR) 20 MG tablet, Take 1 tablet by mouth Daily., Disp: , Rfl:     cyclobenzaprine (FLEXERIL) 10 MG  tablet, Take 1 tablet by mouth 3 (Three) Times a Day., Disp: 20 tablet, Rfl: 0    dexmethylphenidate (FOCALIN) 10 MG tablet, Take 1 tab at noon daily, Disp: 30 tablet, Rfl: 0    dexmethylphenidate XR (FOCALIN XR) 20 MG 24 hr capsule, TAKE 1 CAPSULE BY MOUTH EVERY MORNING, Disp: 60 capsule, Rfl: 0    diclofenac-miSOPROStol (ARTHROTEC 50) 50-0.2 MG EC tablet, Take 1 tablet by mouth 2 (Two) Times a Day., Disp: , Rfl:     ferrous sulfate 324 (65 Fe) MG tablet delayed-release EC tablet, Take 1 tablet by mouth Daily With Breakfast., Disp: , Rfl:     gabapentin (NEURONTIN) 300 MG capsule, Take 1 capsule by mouth 2 (Two) Times a Day., Disp: , Rfl:     HYDROcodone-acetaminophen (NORCO) 7.5-325 MG per tablet, , Disp: , Rfl:     hydroxychloroquine (PLAQUENIL) 200 MG tablet, , Disp: , Rfl:     ibuprofen (ADVIL,MOTRIN) 800 MG tablet, Take 1 tablet by mouth 3 (Three) Times a Day As Needed., Disp: , Rfl:     ketorolac (TORADOL) 10 MG tablet, Take 1 tablet by mouth Every 6 (Six) Hours As Needed for Moderate Pain., Disp: 15 tablet, Rfl: 0    lisdexamfetamine (Vyvanse) 40 MG capsule, Take 1 capsule by mouth Every Morning, Disp: , Rfl:     lisinopril (PRINIVIL,ZESTRIL) 20 MG tablet, Take 1 tablet by mouth Daily., Disp: 90 tablet, Rfl: 1    Lurasidone HCl (LATUDA) 80 MG tablet tablet, TAKE 1 TABLET BY MOUTH DAILY., Disp: 90 tablet, Rfl: 1    metoprolol tartrate (LOPRESSOR) 25 MG tablet, Take 1 tablet by mouth 2 (Two) Times a Day., Disp: , Rfl:     ondansetron (Zofran) 4 MG tablet, Take 1 tablet by mouth Every 8 (Eight) Hours As Needed for Nausea or Vomiting., Disp: 20 tablet, Rfl: 0    vitamin D (ERGOCALCIFEROL) 1.25 MG (45288 UT) capsule capsule, Take 1 capsule by mouth Every 7 (Seven) Days., Disp: , Rfl:     Vraylar 3 MG capsule capsule, , Disp: , Rfl:     oxyCODONE-acetaminophen (PERCOCET) 5-325 MG per tablet, Take 1 tablet by mouth Every 6 (Six) Hours As Needed for Severe Pain., Disp: 12 tablet, Rfl: 0    Prucalopride Succinate  "(Motegrity) 2 MG tablet, Take 1 tablet by mouth Daily., Disp: 14 tablet, Rfl: 0    traMADol (ULTRAM) 50 MG tablet, Take 1 tablet by mouth Every 6 (Six) Hours As Needed., Disp: , Rfl:      There are no discontinued medications.       REVIEW OF SYSTEMS    Review of Systems     PAST MEDICAL HISTORY    Past Medical History:   Diagnosis Date    ADHD (attention deficit hyperactivity disorder) 3/1/2023    Allergic 1990    Anxiety     Arthritis     Bipolar mood disorder     Broken bones     Cataract     Class 3 severe obesity due to excess calories with serious comorbidity and body mass index (BMI) of 50.0 to 59.9 in adult 2021    DDD (degenerative disc disease), lumbar     Depression     Foot pain     Generalized anxiety disorder with panic attacks 2021    Heel pain     Hypertension 3/1/2023    Hypoglycemia     Infectious viral hepatitis 2023    C    Leg pain     Limb swelling     Low back pain 2019    Muscle cramps     Numbness in feet     Prediabetes     Psychiatric disorder     Seasonal allergies      Past Surgical History:   Procedure Laterality Date     SECTION      x 2    FOOT SURGERY      HYSTERECTOMY      LASER ABLATION      LUMBAR DISCECTOMY Left 2020    MINIMALLY INVASIVE L5-S1    OTHER SURGICAL HISTORY      METAL IMPLANTS     Allergies   Allergen Reactions    Lamotrigine GI Intolerance    Sulfa Antibiotics Hives, Itching and Rash     Physical Exam      Vitals:    25 1348   BP: 120/68   BP Location: Left arm   Patient Position: Sitting   Cuff Size: Adult   Pulse: 99   Temp: 99.4 °F (37.4 °C)   TempSrc: Oral   Weight: (!) 161 kg (355 lb)   Height: 167.6 cm (65.98\")     Gen: Well-developed, morbidly obese adult in no apparent distress. Pleasant and cooperative. Alert and oriented.   HEENT: EOMI, MMM, oropharynx clear without oral ulcers, no lacrimal gland enlargement, normal salivary pooling, normal temporal arteries without tenderness or beading.  Chest: Normal " work of breathing. CTAB without wheezing/rhonchi/rales. ??  CV: RRR, normal S1/S2, no murmurs/rubs/gallops heard. ??  Extremities: Warm, 2+ distal pulses, no cyanosis, clubbing, or edema.  Neuro: Good comprehension/cognition. Muscle strength 5/5 in all extremities. Gait normal.??  ??Skin: No alopecia, nail change (including no nail pitting),  bruising, petechiae, sclerodactyly, digital pits, telangiectasias, tophi, appreciable calcinosis, or nodules.??  No psoriatic rash currently present on skin exam.  Right armpit pustule noted but denies HS lesions in her groin.    Comprehensive Musculoskeletal Examination:?  - Jaw, neck without limited ROM.?  - Shoulders, elbows, wrists, knees, feet/toes: No deformity, erythema, warmth, swelling, effusion, tenderness, or limited ROM.??  - bilateral hand: No evidence of synovitis, no warmth, bit of skin redness, PIP free of tenosynovitis. Bilateral index fingers with early heberden's note.  - Elbow: Tenderness on palpation  - Ankles: Swelling bilaterally, no synovitis  - Lumbosacral spine and hips - exam not conducted    LABS AND IMAGING ll laboratory data was reviewed and relevant values are noted as below.    See pertinent labs comments in HPI    Inflammatory Markers  C-Reactive Protein (mg/dL)   Date Value   03/26/2025 0.75 (H)      Risk factors:  Hemoglobin A1C (%)   Date Value   04/16/2021 5.6     LDL Cholesterol  (mg/dL)   Date Value   03/25/2021 134 (H)     XR Ankle 3+ View Right  Narrative: XR FOOT 3+ VW RIGHT, XR ANKLE 3+ VW RIGHT  Date of Exam: 3/17/2025 1:20 PM EDT    Findings:  The right ankle mortise is intact. Calcaneal enthesophytes are present. There is an old fracture of the proximal shaft of the right fifth metatarsal. There appears to be a subacute to chronic fracture of the proximal shaft of the right fourth metatarsal.  Impression: Impression:  Old proximal right fifth metatarsal fracture. Subacute to chronic fracture of the proximal right fourth  metatarsal.      XR Ankle 3+ View Left  Narrative: XR FOOT 3+ VW LEFT, XR ANKLE 3+ VW LEFT  Date of Exam: 3/17/2025 1:20 PM EDT    Findings:  The left ankle mortise is intact. There are 2 surgical screws in the distal left tibia. Calcaneal enthesophytes are present. There are degenerative changes in the midfoot. No erosions are identified. No evidence of acute fracture or dislocation.   Degenerative changes are present at the tibiotalar joint. Mild generalized soft tissue swelling.  Impression: Impression:  Degenerative change. No acute osseous abnormalities are identified.     XR Wrist 2 View Right  Narrative: XR WRIST 2 VW LEFT, XR WRIST 2 VW RIGHT    Date of Exam: 3/17/2025 1:20 PM EDT    Findings:  3 views of both wrists were obtained. Mineralization is normal. No fractures, dislocations or acute osseous abnormalities are identified. There are no erosions.  Impression: Impression:  No osseous abnormalities are identified.     XR Wrist 2 View Left  Narrative: XR WRIST 2 VW LEFT, XR WRIST 2 VW RIGHT     Findings:  3 views of both wrists were obtained. Mineralization is normal. No fractures, dislocations or acute osseous abnormalities are identified. There are no erosions.  Impression: Impression:  No osseous abnormalities are identified.     XR Hand 3+ View Right  Narrative: XR HAND 3+ VW LEFT, XR HAND 3+ VW RIGHT    Date of Exam: 3/17/2025 1:20 PM EDT     Findings:  4 views of both hands were obtained. No evidence of acute fracture or dislocation. There is an old right fifth metacarpal fracture. Mineralization is normal. There are no erosions.  Impression: Impression:  No acute osseous abnormalities are identified.    XR Hand 3+ View Left  Narrative: XR HAND 3+ VW LEFT, XR HAND 3+ VW RIGHT  Date of Exam: 3/17/2025 1:20 PM EDT  Findings:  4 views of both hands were obtained. No evidence of acute fracture or dislocation. There is an old right fifth metacarpal fracture. Mineralization is normal. There are no  erosions.  Impression: Impression:  No acute osseous abnormalities are identified.     Assessment & Plan  Raven Eckert is a 34 y.o. female diffuse multiple joint pains, elevated rheumatoid factor.  She has previously been seen by another rheumatologist for the past year.  She is here for evaluation to establish care for her rheumatologic condition.  She is taking hydroxychloroquine for a yet undefined/undiagnosed condition.    She has chronic musculoskeletal pain including her small and large joints and has a history of slight elevated rheumatoid factor (23) with a negative CCP.  She has a history of hepatitis C infection and claims completion of treatment for hepatitis C but no available results of her recent quantification HCV RNA testing.  She has not had any benefit from taking hydroxychloroquine.       Regarding her polyarthralgia, this is likely multifactorial, with mostly noninflammatory etiology: including diffuse osteoarthritis from her obesity, neuropathic/entrapment manifestation from compression of peripheral nerves, she has had surgery in her lower back for treatment of degenerative disease.  Her history of intermittent skin/scalp and recent palmoplantar psoriasis lesions encourages consideration for evaluation for psoriatic arthritis, but she has not had features of enthesitis, dactylitis, sacroiliitis or nail psoriasis.  Review of imaging of hands, wrists, ankles, feet does not show evidence of inflammatory changes, erosions, ankylosis or joint space narrowing.      I discussed with patient that it is less likely she has rheumatoid arthritis due to the absence of typical symmetrical presentation, lack of synovitis on exam, negative imaging findings and lack of any response to hydroxychloroquine.  The low titer RF (23) is nonspecific, especially in the presence of a negative CCP, and her history of positive hep C infection, which is known to be associated with RF positivity.    I discussed my  impression with patient, including my low suspicion for rheumatoid arthritis or compelling inflammatory arthritis at this time.  I cautioned her about the use of hydroxychloroquine as this will cause her skin psoriasis to flare frequently. She also does not think it is helping her joint symptoms.  The patient was not happy and became visibly upset at the suggestion that she may not have rheumatoid arthritis. She accepted the option to pursue a second opinion elsewhere.    Plan:  - Will not recommend or support continuing DMARD therapy for rheumatoid arthritis at this time as I am unable to confirm this diagnosis  - Patient has opted to seek a second opinion from another rheumatologist.      Diagnoses and all orders for this visit:    1. Polyarthralgia (Primary)  Comments:  She has h/o diffuse/multiple joint pain including small and large joints with no defined diagnosis.  Suspect multifactorial etiology, mainly noninflammatory    2. Elevated rheumatoid factor  Comments:  Has low titer rheumatoid factor 23.5 and negative CCP.    3. History of positive hepatitis C    4. Psoriasis  Comments:  Has a h/o intermittent skin psoriasis-scalp, palmo-plantar psoriasis.  No nail involvement.  Has not seen a dermatologist, no current lesions present.    5. Morbid obesity with body mass index (BMI) of 50.0 to 59.9 in adult  Comments:  Notable obesity, have not tried weight loss medication.  Likely contributing to diffuse musculoskeletal pain    6. Tobacco use  Comments:  Patient has longstanding smoking history.  She indicates transitioning to vaping recently as a path to quitting.       Patient or patient representative verbalized consent for the use of Ambient Listening during the visit with  Matthieu Rhodes MD for chart documentation. 7/17/2025  19:23 EDT    I spent 60 minutes caring for Raven on this date of service. This time includes time spent by me in the following activities:preparing for the visit, reviewing tests,  obtaining and/or reviewing a separately obtained history, performing a medically appropriate examination and/or evaluation , counseling and educating the patient/family/caregiver, referring and communicating with other health care professionals , documenting information in the medical record, and independently interpreting results and communicating that information with the patient/family/caregiver